# Patient Record
Sex: FEMALE | Race: WHITE | NOT HISPANIC OR LATINO | Employment: OTHER | URBAN - METROPOLITAN AREA
[De-identification: names, ages, dates, MRNs, and addresses within clinical notes are randomized per-mention and may not be internally consistent; named-entity substitution may affect disease eponyms.]

---

## 2021-02-19 ENCOUNTER — NURSING HOME VISIT (OUTPATIENT)
Dept: GERIATRICS | Facility: OTHER | Age: 71
End: 2021-02-19
Payer: COMMERCIAL

## 2021-02-19 VITALS
HEART RATE: 84 BPM | RESPIRATION RATE: 16 BRPM | OXYGEN SATURATION: 96 % | DIASTOLIC BLOOD PRESSURE: 64 MMHG | WEIGHT: 145.6 LBS | SYSTOLIC BLOOD PRESSURE: 123 MMHG | TEMPERATURE: 97.6 F

## 2021-02-19 DIAGNOSIS — J90 PLEURAL EFFUSION: ICD-10-CM

## 2021-02-19 DIAGNOSIS — E11.9 DIABETES MELLITUS TYPE 2 IN NONOBESE (HCC): ICD-10-CM

## 2021-02-19 DIAGNOSIS — K70.30 ALCOHOLIC CIRRHOSIS OF LIVER WITHOUT ASCITES (HCC): ICD-10-CM

## 2021-02-19 DIAGNOSIS — I85.11 SECONDARY ESOPHAGEAL VARICES WITH BLEEDING (HCC): ICD-10-CM

## 2021-02-19 DIAGNOSIS — G62.9 NEUROPATHY: ICD-10-CM

## 2021-02-19 DIAGNOSIS — D62 ACUTE BLOOD LOSS ANEMIA: ICD-10-CM

## 2021-02-19 DIAGNOSIS — S32.040D COMPRESSION FRACTURE OF L4 VERTEBRA WITH ROUTINE HEALING, SUBSEQUENT ENCOUNTER: Primary | ICD-10-CM

## 2021-02-19 DIAGNOSIS — F10.11 HISTORY OF ALCOHOL ABUSE: ICD-10-CM

## 2021-02-19 PROCEDURE — 99305 1ST NF CARE MODERATE MDM 35: CPT | Performed by: INTERNAL MEDICINE

## 2021-02-20 PROBLEM — G62.9 NEUROPATHY: Status: ACTIVE | Noted: 2021-02-20

## 2021-02-20 PROBLEM — F10.11 HISTORY OF ALCOHOL ABUSE: Status: ACTIVE | Noted: 2021-02-20

## 2021-02-20 PROBLEM — E11.9 DIABETES MELLITUS TYPE 2 IN NONOBESE (HCC): Status: ACTIVE | Noted: 2021-02-20

## 2021-02-20 PROBLEM — S32.000A COMPRESSION FRACTURE OF LUMBAR VERTEBRA (HCC): Status: ACTIVE | Noted: 2021-02-20

## 2021-02-20 PROBLEM — I85.11 SECONDARY ESOPHAGEAL VARICES WITH BLEEDING (HCC): Status: ACTIVE | Noted: 2021-02-20

## 2021-02-20 PROBLEM — K70.30 ALCOHOLIC CIRRHOSIS OF LIVER WITHOUT ASCITES (HCC): Status: ACTIVE | Noted: 2021-02-20

## 2021-02-20 PROBLEM — D62 ACUTE BLOOD LOSS ANEMIA: Status: ACTIVE | Noted: 2021-02-20

## 2021-02-20 PROBLEM — J90 PLEURAL EFFUSION: Status: ACTIVE | Noted: 2021-02-20

## 2021-02-20 NOTE — ASSESSMENT & PLAN NOTE
Noted recently on EGD  Continue atenolol and spironolactone  Patient additionally remains on Protonix  Monitor hemoglobin    Recent hemoglobin was 8 5

## 2021-02-20 NOTE — ASSESSMENT & PLAN NOTE
Patient was recently hospitalized with back pain post fall  Imaging studies revealed L1 and L4 compression fracture  Nonsurgical conservative treatment was recommended with analgesics and TLSO brace  Patient remains on oxycodone p r n  for now  Continue PT OT

## 2021-02-20 NOTE — PROGRESS NOTES
[facility]  [address], [address] [place] [address]    Nursing Home Admission    NAME: Kelsey Chaney  AGE: 78 y o  SEX: female [id number]      Patient Location     [facility]    Patients care was coordinated with nursing facility staff  Recent vitals, labs and updated medications were reviewed on [facility] system of facility  Past Medical, surgical, social, medication and allergy history and patients previous records reviewed  Assessment/Plan:    Compression fracture of lumbar vertebra [facility])   Patient was recently hospitalized with back pain post fall  Imaging studies revealed L1 and L4 compression fracture  Nonsurgical conservative treatment was recommended with analgesics and TLSO brace  Patient remains on oxycodone p r n  for now  Continue PT OT  Alcoholic cirrhosis of liver without ascites ([facility] [address] )   Patient is known to have alcoholic liver cirrhosis  Remains on lactulose, atenolol and spironolactone  Patient reports she does not tolerate lactulose well due to diarrhea  Check ammonia level  Recent EGD revealed esophageal varices  Patient was additionally noted to have acute blood loss anemia needing ICU stay  Continue Protonix follow-up with GI service    Diabetes mellitus type 2 in nonobese (HCC)    Last hemoglobin A1c is unknown  Recent blood sugars were  variable with readings of 212, 174 158 in 297  Patient remains on metformin and glimepiride  Reports having a hypoglycemic episode approximately a month ago in the setting of decreased oral intake  Considering increased risk of hypoglycemia with sulfonylureas, consider alternative treatment with insulin  Will defer to PCP  Follow blood sugars for now    Neuropathy   Continue gabapentin    Secondary esophageal varices with bleeding ([facility] [address] )   Noted recently on EGD  Continue atenolol and spironolactone  Patient additionally remains on Protonix  Monitor hemoglobin  Recent hemoglobin was 8 5    History of alcohol abuse   Patient was counseled regarding alcohol cessation    Pleural effusion   Recent chest x-ray revealed pleural effusion  Patient underwent thoracentesis x3 recently at the hospital   Currently denies any shortness of breath monitor closely for any recurrence of effusion    Acute blood loss anemia  Recent HGB was 8 5  Continue Protonix  Chief Complaint      nursing home admission   L1, L4 compression fracture, pleural effusion status post thoracentesis      HPI     Patient is a 78 y o  female  With past medical history significant for diabetes mellitus type 2, hypertension,  Alcoholic liver cirrhosis, hyperlipidemia, esophageal varices and neuropathy  Patient was recently hospitalized  with back pain post fall at home  she was admitted under Trauma Service  Imaging studies revealed L1 and L4 compression fracture  Conservative treatment with  Analgesics and TLSO brace  was recommended  During the hospital stay patient was also noted to have large size pleural effusion for which she underwent IR guided right thoracentesis on 02/05 and 2/17  Patient was additionally found to have GI bleed and was seen by GI service  EGD revealed  Bleeding esophageal varices and polyps  During this time patient also developed acute blood loss anemia and required a stay in ICU for stabilization  Patient required supplemental oxygen near time of discharge,  IR performed another thoracentesis prior to discharge  She was subsequently transferred out of ICU stabilized, was seen by PT OT services and later discharged to [facility] rehab where she is being seen for post hospital admission  At the time of my evaluation patient is doing okay  Back pain is controlled  Denies any dyspnea or chest pain    There have been no reports of any fever or chills    PMH   alcoholic liver cirrhosis  Diabetes mellitus type 2   Neuropathy Hypertension  Hyperlipidemia  Neuropathy   Seasonal allergies    PSH:   breast biopsy  Cholecystectomy  Nasal septum surgery     Medication:  Updated list was reviewed in [facility] of facility  Lasix 40 mg daily  Atenolol 25 mg daily   Lactulose 30 mL t i d   Glimepiride 4 mg b i d  Oxycodone 5 mg q 6 hours p r n  Loratadine 10 mg daily   lidocaine patches  to be applied to affected area daily   spironolactone 50 mg daily   gabapentin 300 mg 3 times a day   pantoprazole 40 mg daily   Metformin 500 mg t i d  acetaminophen on p r n  basis  Sodium chloride hypertonic high drops 1 drop in both eyes daily    Family history   noncontributory    Allergies:   no known drug allergies      Social history:    No history of tobacco abuse  Pt reports drinking heavily in the past   Reports drinking until a month ago  She lives at home with  her       Vitals:    02/19/21 0915   BP: 123/64   Pulse: 84   Resp: 16   Temp: 97 6 °F (36 4 °C)   SpO2: 96%       Review of Systems   Constitutional: Negative for chills, fatigue and fever  HENT: Negative for nosebleeds and rhinorrhea  Eyes: Negative for discharge and redness  Respiratory: Negative for cough, chest tightness, shortness of breath, wheezing and stridor  Cardiovascular: Negative for chest pain and leg swelling  Gastrointestinal: Negative for abdominal distention, abdominal pain, diarrhea and vomiting  Genitourinary: Negative for dysuria, flank pain and hematuria  Musculoskeletal: Positive for back pain and gait problem  Negative for arthralgias  Skin: Negative for pallor  Neurological: Positive for weakness (Generalized)  Negative for tremors, seizures, syncope and headaches  Psychiatric/Behavioral: Negative for agitation, behavioral problems and confusion  Physical Exam  Constitutional:       General: She is not in acute distress  Appearance: She is well-developed  She is not diaphoretic     HENT:      Head: Normocephalic and atraumatic  Nose: No rhinorrhea  Eyes:      General: No scleral icterus  Right eye: No discharge  Left eye: No discharge  Neck:      Musculoskeletal: Neck supple  Cardiovascular:      Rate and Rhythm: Normal rate and regular rhythm  Pulmonary:      Effort: No respiratory distress  Breath sounds: No stridor  No wheezing  Abdominal:      General: There is no distension  Palpations: Abdomen is soft  Tenderness: There is no abdominal tenderness  Comments: Enlarged liver   Musculoskeletal:      Right lower leg: No edema  Left lower leg: No edema  Skin:     Coloration: Skin is not jaundiced or pale  Neurological:      General: No focal deficit present  Mental Status: She is alert and oriented to person, place, and time  Cranial Nerves: No cranial nerve deficit  Psychiatric:         Mood and Affect: Mood normal          Behavior: Behavior normal            Diagnostic Data       Recent labs were reviewed  labs done on 02/04/2021 revealed hemoglobin of 8 5, WBC count 4 9, platelet count 993  BUN 33, creatinine 0 77, blood glucose 247, sodium 138, potassium 4 6    Additional notes:   Continue current meds  Monitor closely for any recurrence of pleural effusion    Continue PT OT  Repeat labs in 1 week   consider switching to insulin per PCP as patient has increased risk of hypoglycemia with glimepiride      This note was electronically signed by Dr Cindy Bowers

## 2021-02-20 NOTE — ASSESSMENT & PLAN NOTE
Last hemoglobin A1c is unknown  Recent blood sugars were  variable with readings of 212, 174 158 in 297  Patient remains on metformin and glimepiride  Reports having a hypoglycemic episode approximately a month ago in the setting of decreased oral intake  Considering increased risk of hypoglycemia with sulfonylureas, consider alternative treatment with insulin  Will defer to PCP    Follow blood sugars for now

## 2021-02-20 NOTE — ASSESSMENT & PLAN NOTE
Patient is known to have alcoholic liver cirrhosis  Remains on lactulose, atenolol and spironolactone  Patient reports she does not tolerate lactulose well due to diarrhea  Check ammonia level  Recent EGD revealed esophageal varices  Patient was additionally noted to have acute blood loss anemia needing ICU stay    Continue Protonix follow-up with GI service

## 2021-02-20 NOTE — ASSESSMENT & PLAN NOTE
Recent chest x-ray revealed pleural effusion    Patient underwent thoracentesis x3 recently at the hospital   Currently denies any shortness of breath monitor closely for any recurrence of effusion

## 2021-02-23 ENCOUNTER — NURSING HOME VISIT (OUTPATIENT)
Dept: GERIATRICS | Facility: OTHER | Age: 71
End: 2021-02-23
Payer: COMMERCIAL

## 2021-02-23 VITALS
OXYGEN SATURATION: 97 % | DIASTOLIC BLOOD PRESSURE: 74 MMHG | TEMPERATURE: 97.5 F | SYSTOLIC BLOOD PRESSURE: 125 MMHG | RESPIRATION RATE: 18 BRPM | HEART RATE: 81 BPM

## 2021-02-23 DIAGNOSIS — I85.11 SECONDARY ESOPHAGEAL VARICES WITH BLEEDING (HCC): ICD-10-CM

## 2021-02-23 DIAGNOSIS — D62 ACUTE BLOOD LOSS ANEMIA: ICD-10-CM

## 2021-02-23 DIAGNOSIS — E11.9 DIABETES MELLITUS TYPE 2 IN NONOBESE (HCC): ICD-10-CM

## 2021-02-23 DIAGNOSIS — S32.040D COMPRESSION FRACTURE OF L4 VERTEBRA WITH ROUTINE HEALING, SUBSEQUENT ENCOUNTER: Primary | ICD-10-CM

## 2021-02-23 DIAGNOSIS — F10.11 HISTORY OF ALCOHOL ABUSE: ICD-10-CM

## 2021-02-23 DIAGNOSIS — K70.30 ALCOHOLIC CIRRHOSIS OF LIVER WITHOUT ASCITES (HCC): ICD-10-CM

## 2021-02-23 DIAGNOSIS — G62.9 NEUROPATHY: ICD-10-CM

## 2021-02-23 DIAGNOSIS — J90 PLEURAL EFFUSION: ICD-10-CM

## 2021-02-23 DIAGNOSIS — R60.9 EDEMA, UNSPECIFIED TYPE: ICD-10-CM

## 2021-02-23 PROCEDURE — 99316 NF DSCHRG MGMT 30 MIN+: CPT | Performed by: NURSE PRACTITIONER

## 2021-02-23 RX ORDER — FUROSEMIDE 40 MG/1
40 TABLET ORAL DAILY
COMMUNITY

## 2021-02-24 RX ORDER — PANTOPRAZOLE SODIUM 40 MG/1
40 TABLET, DELAYED RELEASE ORAL DAILY
COMMUNITY

## 2021-02-24 RX ORDER — SILICONE ADHESIVE 1.5" X 3"
1 SHEET (EA) TOPICAL EVERY EVENING
COMMUNITY

## 2021-02-24 RX ORDER — ATENOLOL 25 MG/1
25 TABLET ORAL DAILY
COMMUNITY

## 2021-02-24 RX ORDER — GLIMEPIRIDE 4 MG/1
4 TABLET ORAL 2 TIMES DAILY
COMMUNITY

## 2021-02-24 RX ORDER — ACETAMINOPHEN 325 MG/1
650 TABLET ORAL EVERY 6 HOURS PRN
COMMUNITY

## 2021-02-24 RX ORDER — LORATADINE 10 MG/1
10 TABLET ORAL DAILY
COMMUNITY

## 2021-02-24 RX ORDER — LACTULOSE 10 G/15ML
20 SOLUTION ORAL 3 TIMES DAILY
COMMUNITY

## 2021-02-24 RX ORDER — OXYCODONE HYDROCHLORIDE 5 MG/1
5 CAPSULE ORAL EVERY 6 HOURS PRN
COMMUNITY
End: 2021-02-25 | Stop reason: SDUPTHER

## 2021-02-24 RX ORDER — GABAPENTIN 300 MG/1
300 CAPSULE ORAL 3 TIMES DAILY
COMMUNITY

## 2021-02-24 NOTE — ASSESSMENT & PLAN NOTE
·  Continue wearing brace  ·  Follow-up with Dr Toshia Sierra ,Orthopedics on discharge and for continue pain management  ·  Oxycodone 5 mg 1 tablet every 6 hours p r n   ·  lidocaine Mental pouch applied to lumbar area L1-L4 topically   ·  gabapentin 300 mg tablets t i d

## 2021-02-24 NOTE — ASSESSMENT & PLAN NOTE
No results found for: HGBA1C   · Blood sugar today was  108    · Continue glimepiride 4 mg b i d  to and metformin 500 mg t i d   · Follow-up with PCP for monitoring

## 2021-02-24 NOTE — ASSESSMENT & PLAN NOTE
·  Continue atenolol 25 mg daily, spironolactone 50 mg daily  ·  Follow-up with GI service  for monitoring

## 2021-02-24 NOTE — ASSESSMENT & PLAN NOTE
·  Continue atenolol 25 mg daily, lactulose 30 mL L 3 times a day, spironolactone 50 mg daily  ·  Follow-up with   Dr Bethany Okeefe, GI service on discharge

## 2021-02-24 NOTE — PROGRESS NOTES
[facility]  [address]  [phone]   CODE 021    DISCHARGE NOTE    Name: Carson Chaney  AGE: 78 y o  SEX: female    MRN: [id number]    DATE OF ENCOUNTER:  02/23/2021    Patient Location     [facility]    Reason For Visit/ Chief Complaint     DISCHARGE       1  Compression fracture of L4 vertebra with routine healing, subsequent encounter  Assessment & Plan:  ·  Continue wearing brace  ·  Follow-up with Dr Jayson Oakley ,Orthopedics on discharge and for continue pain management  ·  Oxycodone 5 mg 1 tablet every 6 hours p r n   ·  lidocaine Mental pouch applied to lumbar area L1-L4 topically   ·  gabapentin 300 mg tablets t i d  2  Acute blood loss anemia  Assessment & Plan:  · Improved  · Previous Hgb was 8 3  · Hgb on 02/22/2021 was 10 3        3  Pleural effusion  Assessment & Plan:  ·   Patient denies shortness of breath  ·   Follow-up with PCP for monitoring  4  Secondary esophageal varices with bleeding (HCC)  Assessment & Plan:  ·  Continue atenolol 25 mg daily, spironolactone 50 mg daily  ·  Follow-up with GI service  for monitoring      5  Diabetes mellitus type 2 in nonobese [facility])  Assessment & Plan:    No results found for: HGBA1C   · Blood sugar today was  108  · Continue glimepiride 4 mg b i d  to and metformin 500 mg t i d   · Follow-up with PCP for monitoring        6  Edema, unspecified type  Assessment & Plan:  ·  Continue Lasix 40 mg daily  ·   Elevate lower extremities  7  Alcoholic cirrhosis of liver without ascites (HCC)  Assessment & Plan:  ·  Continue atenolol 25 mg daily, lactulose 30 mL L 3 times a day, spironolactone 50 mg daily  ·  Follow-up with   Dr Frances Baumann, GI service on discharge  8  History of alcohol abuse  Assessment & Plan:  ·  Patient counseling  ·  consider alcoholics anonymous      9   Neuropathy  Assessment & Plan:  · Continue gabapentin 300 mg t i d  ·   Fall precautions  HPI      Drury Cockayne is a 78 y o  female who is being seen today for discharge stability and prescription refills  She has a past medical history of alcoholic liver cirrhosis, HTN, DM 2, HLD, esophageal varices, and neuropathy  Patient was recently hospitalized status post fall at home with increased back pain  Imaging studies reveal L1 and L4 compression fractures  Conservative treatment was recommended with pain relief and brace   Additionally, she was found to have a GI bleed and was seen by GI service, she underwent IR guided thoracentesis for pleural effusions  She was sent to [facility] rehab facility for PT/OT services  Patient is being seen at request of nurse and   Patient is leaving angry messages with explitives  She  Continuously route to nurses and staff demanding that she wants to go home tomorrow and will sign herself out AMA  regardless  Patient was advised  she should remain in facility for more days until she gets stronger  Patient  adamant to going home  Spoke to therapy department and her mobility is OK  She lives home with her    will set her up with home health services, PT/ OT, in nursing services at home  Although I believe she would benefit from staying in facility for monitoring of her medical issues and for further PT/OT treatment, she will not stay  There is a possible chance of rehospitalization due to her previous medical issues, recurrent pleural effusions, and behavioral issues  Patient has been advised of this  She is of sound mind and alert, awake an oriented  On examination she reports continuous back pain  She has an angry demeanor with demeaning and degrading comments  Vital signs are stable  Other than behavioral issues no other current issues reported by staff    She denies fever, chills, nausea, vomiting, abdominal pain, chest pain,  Increased shortness of breath, urinary issues, constipation issues  I spoke to Dr Divina Brown regarding patient's pain level and medication  She will be in to see her today  Instruction will be given to her to follow-up with Dr Derick Acosta, PCP, Dr Matilde Hernandez, Darius Bartlett, and Dr Belinda Dodd, Endocrinology  I will reach out to her PCP to inform him of patient  Review of Systems   Constitutional: Positive for fatigue  Negative for chills and fever  HENT: Negative for nosebleeds and rhinorrhea  Eyes: Negative for discharge and redness  Respiratory: Negative for cough, chest tightness, shortness of breath, wheezing and stridor  Cardiovascular: Negative for chest pain and leg swelling  Gastrointestinal: Negative for abdominal distention, abdominal pain, constipation, diarrhea, nausea and vomiting  Genitourinary: Negative for difficulty urinating, dysuria, flank pain and hematuria  Musculoskeletal: Positive for back pain (Lumbar) and gait problem  Negative for arthralgias  Skin: Negative for pallor  Neurological: Positive for weakness (Generalized)  Negative for tremors, seizures, syncope and headaches  Psychiatric/Behavioral: Positive for agitation and behavioral problems  Negative for confusion  Past Medical History:   Diagnosis Date    ALC (alcoholic liver cirrhosis) (HCC)     Diabetes mellitus ([facility] [address] )     HLD (hyperlipidemia)     Hypertension     Neuropathy     Seasonal allergies        Past Surgical History:   Procedure Laterality Date    BREAST BIOPSY      CHOLECYSTECTOMY      NASAL SEPTUM SURGERY         Social History     Social History     Tobacco Use   Smoking Status Never Smoker       Social History     Substance and Sexual Activity   Alcohol Use Not Currently    Comment: Pt reports drinking heavily in the past   Reports drinking until a month ago  History reviewed  No pertinent family history       No Known Allergies    Medications Current Outpatient Medications:     acetaminophen (TYLENOL) 325 mg tablet, Take 650 mg by mouth every 6 (six) hours as needed for mild pain, Disp: , Rfl:     atenolol (TENORMIN) 25 mg tablet, Take 25 mg by mouth daily, Disp: , Rfl:     furosemide (LASIX) 40 mg tablet, Take 40 mg by mouth daily, Disp: , Rfl:     gabapentin (NEURONTIN) 300 mg capsule, Take 300 mg by mouth 3 (three) times a day , Disp: , Rfl:     glimepiride (AMARYL) 4 mg tablet, Take 4 mg by mouth 2 (two) times a day, Disp: , Rfl:     lactulose (CHRONULAC) 10 g/15 mL solution, Take 20 g by mouth 3 (three) times a day, Disp: , Rfl:     Lidocaine-Menthol 3 6-1 25 % PTCH, Apply 1 application topically daily, Disp: , Rfl:     loratadine (CLARITIN) 10 mg tablet, Take 10 mg by mouth daily, Disp: , Rfl:     metFORMIN (GLUCOPHAGE) 500 mg tablet, Take 500 mg by mouth 3 (three) times a day, Disp: , Rfl:     oxyCODONE (OXY-IR) 5 MG capsule, Take 5 mg by mouth every 6 (six) hours as needed for moderate pain or severe pain, Disp: , Rfl:     pantoprazole (PROTONIX) 40 mg tablet, Take 40 mg by mouth daily, Disp: , Rfl:     sodium chloride (EAMON 128) 5 % hypertonic ophthalmic solution, Administer 1 drop to both eyes every evening, Disp: , Rfl:     Updated list was reviewed in [address] [place] [address] system of facility  Vitals:    02/23/21 2345   BP: 125/74   Pulse: 81   Resp: 18   Temp: 97 5 °F (36 4 °C)   SpO2: 97%       Physical Exam  Constitutional:       General: She is not in acute distress  Appearance: She is well-developed  She is not diaphoretic  HENT:      Head: Normocephalic and atraumatic  Nose: No rhinorrhea  Eyes:      General: No scleral icterus  Right eye: No discharge  Left eye: No discharge  Conjunctiva/sclera: Conjunctivae normal    Cardiovascular:      Rate and Rhythm: Normal rate  Heart sounds: No murmur  Pulmonary:      Effort: Pulmonary effort is normal  No respiratory distress        Breath sounds: Normal breath sounds  No stridor  No wheezing or rales  Abdominal:      General: Bowel sounds are normal  There is no distension  Palpations: Abdomen is soft  Tenderness: There is no abdominal tenderness  There is no guarding  Musculoskeletal:         General: No deformity  Skin:     General: Skin is warm and dry  Neurological:      General: No focal deficit present  Mental Status: She is alert  Motor: Weakness present  Psychiatric:         Mood and Affect: Affect is angry  Behavior: Behavior is aggressive  Judgment: Judgment is impulsive  Comments: Demeaning and desrespectful         Diagnostic Data     Recent labs were reviewed     02/22/2021  o  Hgb 10 3, Hct 31 1, WBC 4 3  o  BUN 15, Cr 0 point in 2, , K 4 0  o  ammonia 53    Additional Notes      Patients care was coordinated with nursing facility staff  Recent vitals, labs and updated medications were reviewed on [facility]  Past Medical, surgical, social, medication and allergy history and patients previous records reviewed   Greater than 35 minutes was spent on discharge process which includes patient assessment, answering patient questions, review of labs, review of records, medication reconciliation, medication prescribing, care coordination, and providing post discharge instructions         This was electronically signed by Laray Epley, CRNP

## 2021-02-25 DIAGNOSIS — S32.040D COMPRESSION FRACTURE OF L4 VERTEBRA WITH ROUTINE HEALING, SUBSEQUENT ENCOUNTER: Primary | ICD-10-CM

## 2021-02-28 RX ORDER — OXYCODONE HYDROCHLORIDE 5 MG/1
5 CAPSULE ORAL EVERY 6 HOURS PRN
Qty: 120 CAPSULE | Refills: 0 | Status: SHIPPED | OUTPATIENT
Start: 2021-02-28

## 2021-11-03 ENCOUNTER — CONSULT (OUTPATIENT)
Dept: PAIN MEDICINE | Facility: CLINIC | Age: 71
End: 2021-11-03
Payer: COMMERCIAL

## 2021-11-03 VITALS
HEIGHT: 59 IN | SYSTOLIC BLOOD PRESSURE: 168 MMHG | DIASTOLIC BLOOD PRESSURE: 81 MMHG | RESPIRATION RATE: 16 BRPM | WEIGHT: 141 LBS | BODY MASS INDEX: 28.43 KG/M2 | HEART RATE: 89 BPM

## 2021-11-03 DIAGNOSIS — G62.9 NEUROPATHY: Primary | ICD-10-CM

## 2021-11-03 DIAGNOSIS — G89.4 CHRONIC PAIN SYNDROME: ICD-10-CM

## 2021-11-03 DIAGNOSIS — E11.42 DIABETIC PERIPHERAL NEUROPATHY (HCC): ICD-10-CM

## 2021-11-03 PROCEDURE — 99204 OFFICE O/P NEW MOD 45 MIN: CPT | Performed by: STUDENT IN AN ORGANIZED HEALTH CARE EDUCATION/TRAINING PROGRAM

## 2021-11-03 RX ORDER — TRAMADOL HYDROCHLORIDE 50 MG/1
50 TABLET ORAL EVERY 8 HOURS PRN
COMMUNITY
Start: 2021-07-07

## 2022-04-21 ENCOUNTER — OFFICE VISIT (OUTPATIENT)
Dept: GASTROENTEROLOGY | Facility: CLINIC | Age: 72
End: 2022-04-21
Payer: COMMERCIAL

## 2022-04-21 VITALS
DIASTOLIC BLOOD PRESSURE: 80 MMHG | HEIGHT: 59 IN | BODY MASS INDEX: 28.22 KG/M2 | WEIGHT: 140 LBS | SYSTOLIC BLOOD PRESSURE: 134 MMHG

## 2022-04-21 DIAGNOSIS — K70.30 ALCOHOLIC CIRRHOSIS OF LIVER WITHOUT ASCITES (HCC): Primary | ICD-10-CM

## 2022-04-21 PROCEDURE — 99204 OFFICE O/P NEW MOD 45 MIN: CPT | Performed by: INTERNAL MEDICINE

## 2022-04-21 RX ORDER — CARVEDILOL 3.12 MG/1
3.12 TABLET ORAL 2 TIMES DAILY WITH MEALS
COMMUNITY
Start: 2022-04-16

## 2022-04-21 RX ORDER — DULAGLUTIDE 1.5 MG/.5ML
INJECTION, SOLUTION SUBCUTANEOUS
COMMUNITY
Start: 2022-04-05

## 2022-04-21 RX ORDER — DIAZEPAM 10 MG/1
TABLET ORAL
COMMUNITY
Start: 2022-04-10

## 2022-04-21 RX ORDER — SPIRONOLACTONE 100 MG/1
100 TABLET, FILM COATED ORAL DAILY
COMMUNITY
Start: 2022-02-27

## 2022-04-21 RX ORDER — FERROUS SULFATE 325(65) MG
1 TABLET ORAL EVERY OTHER DAY
COMMUNITY
Start: 2022-02-08

## 2022-04-21 RX ORDER — POTASSIUM CHLORIDE 750 MG/1
10 TABLET, FILM COATED, EXTENDED RELEASE ORAL 2 TIMES DAILY
COMMUNITY
Start: 2022-01-04

## 2022-04-21 NOTE — PROGRESS NOTES
[facility]      Chief Complaint:  Cirrhosis    HPI:  Heather Thakkar is a 73 y o   female who presents with alcoholic cirrhosis  Patient was admitted to [facility]  Last EGD on September 2021 showed grade 1-2 varices as well as gastric and duodenal polyps  The patient underwent ultrasound in July 2021 which showed cirrhosis but no other lesions  In February she underwent CBC which showed a hematocrit of 34 9 and platelet count of a 997905  Liver functions were normal except albumin of 3 3  Patient however continues to drink alcohol  She minimizes the importance of stopping  We had a long conversation about this  She was told recently she has more fluid in the abdomen  She has had some mild weight gain  No peripheral edema  She is scheduled for repeat ultrasound  She has no confusion  No alteration of the sleep-wake cycle  No hematemesis or melena  No jaundice  No change in the color of the urine  She has chronic imbalance from neuropathy  She has had broken back recently with back pain         Review of Systems:   Constitutional: No fever or chills, feels well, no tiredness, no recent weight gain or weight loss  HENT: No complaints of earache, no hearing loss, no nosebleeds, no nasal discharge, no sore throat, no hoarseness  Eyes: No complaints of eye pain, no red eyes, no discharge from eyes, no itchy eyes  Cardiovascular: No complaints of slow heart rate, no fast heart rate, no chest pain, no palpitations, no leg claudication, no lower extremity edema  Respiratory: No complaints of shortness of breath, no wheezing, no cough, no SOB on exertion, no orthopnea  Gastrointestinal: As noted in HPI  Genitourinary: No complaints of dysuria, no incontinence, no hesitancy, no nocturia     Musculoskeletal:  As per HPI  Neurological: No complaints of headache, no confusion, no convulsions, positive lower extremity numbness and tingling, no dizziness or fainting, no limb weakness, positive difficulty walking  Skin: No complaints of skin rash or skin lesions, no itching, no skin wound, no dry skin  Hematological/Lymphatic: No complaints of swollen glands, does not bleed easy  Allergic/Immunologic: No immunocompromised state  Endocrine:  No complaints of polyuria, no polydipsia  Psychiatric/Behavioral: is not suicidal, no sleep disturbances, no anxiety or depression, no change in personality, no emotional problems  Historical Information   Past Medical History:   Diagnosis Date    ALC (alcoholic liver cirrhosis) (HCC)     Diabetes mellitus ([facility] [address] )     HLD (hyperlipidemia)     Hypertension     Neuropathy     Seasonal allergies      Past Surgical History:   Procedure Laterality Date    BREAST BIOPSY      CHOLECYSTECTOMY      NASAL SEPTUM SURGERY       Social History   Social History     Substance and Sexual Activity   Alcohol Use Yes    Comment: RARE      Social History     Substance and Sexual Activity   Drug Use Yes    Types: Marijuana    Comment:  unknown     Social History     Tobacco Use   Smoking Status Never Smoker   Smokeless Tobacco Never Used     Family History   Problem Relation Age of Onset    Emphysema Mother     Heart attack Father     Heart disease Father          Current Medications: has a current medication list which includes the following prescription(s): carvedilol, ferrous sulfate, furosemide, gabapentin, glimepiride, insulin degludec, loratadine, metformin, pantoprazole, potassium chloride, sodium chloride, spironolactone, trulicity, acetaminophen, atenolol, diazepam, lactulose, lidocaine-menthol, oxycodone, and tramadol          Vital Signs: /80   Ht 4' 10 5" (1 486 m)   Wt 63 5 kg (140 lb)   BMI 28 76 kg/m²       Physical Exam:   Constitutional  General Appearance: No acute distress, well appearing and well nourished  Head  Normocephalic  Eyes  Conjunctivae and lids: No swelling, erythema, or discharge  Pupils and irises: Equal, round and reactive to light  Ears, Nose, Mouth, and Throat  External inspection of ears and nose: Normal  Nasal mucosa, septum and turbinates: Normal without edema or erythema/   Oropharynx: Normal with no erythema, edema, exudate or lesions  Neck  Normal range of motion  Neck supple  Cardiovascular  Auscultation of the heart: Normal rate and rhythm, normal S1 and S2 without murmurs  Examination of the extremities for edema and/or varicosities: Normal  Pulmonary/Chest  Respiratory effort: No increased work of breathing or signs of respiratory distress  Auscultation of lungs: Clear to auscultation, equal breath sounds bilaterally, no wheezes, rales, no rhonchi  Abdomen  Abdomen: Non-tender, no masses  Liver and spleen:  Firm liver edge palpable 1 cm below right costal margin  Mild shifting dullness  Mild fluid wave  Musculoskeletal  Gait and station: normal   Digits and Nails: normal without clubbing or cyanosis  Inspection/palpation of joints, bones, and muscles: Normal  Neurological  No nystagmus or asterixis  Skin  Skin and subcutaneous tissue: Normal without rashes or lesions  Positive palmar erythema  Lymphatic  Palpation of the lymph nodes in neck: No lymphadenopathy  Psychiatric  Orientation to person, place and time: Normal   Mood and affect: Normal          Labs:  Lab Results   Component Value Date    HGBA1C 9 4 (H) 02/16/2022         X-Rays & Procedures:   No orders to display           ______________________________________________________________________      Assessment & Plan:     Diagnoses and all orders for this visit:    Alcoholic cirrhosis of liver without ascites ([facility] [address] )      I stressed to both the patient and her  the importance of stopping all alcohol use which she still has not done  She will take it under advisement  She has an ultrasound scheduled at [facility] coming up  She will call me for the results    We will see her again in September and repeat her EGD after that visit  Further recommendations will depend on her progress and if she quits drinking  We did go over some viable rates in people with cirrhosis    Further recommendations will depend on progress

## 2022-07-13 LAB
LEFT EYE DIABETIC RETINOPATHY: POSITIVE
RIGHT EYE DIABETIC RETINOPATHY: POSITIVE

## 2022-08-22 ENCOUNTER — TELEPHONE (OUTPATIENT)
Dept: GASTROENTEROLOGY | Facility: CLINIC | Age: 72
End: 2022-08-22

## 2022-08-22 NOTE — TELEPHONE ENCOUNTER
Patient had office Visit scheduled for 09/15/22 with Doctor  Patient cancelled  Patient will call the office when ready to reschedule

## 2022-10-28 ENCOUNTER — TELEPHONE (OUTPATIENT)
Dept: GASTROENTEROLOGY | Facility: CLINIC | Age: 72
End: 2022-10-28

## 2022-11-16 ENCOUNTER — TELEPHONE (OUTPATIENT)
Dept: FAMILY MEDICINE CLINIC | Facility: CLINIC | Age: 72
End: 2022-11-16

## 2022-11-16 ENCOUNTER — OFFICE VISIT (OUTPATIENT)
Dept: FAMILY MEDICINE CLINIC | Facility: CLINIC | Age: 72
End: 2022-11-16

## 2022-11-16 VITALS
TEMPERATURE: 97.2 F | OXYGEN SATURATION: 98 % | HEART RATE: 81 BPM | WEIGHT: 137 LBS | SYSTOLIC BLOOD PRESSURE: 124 MMHG | BODY MASS INDEX: 27.62 KG/M2 | HEIGHT: 59 IN | DIASTOLIC BLOOD PRESSURE: 78 MMHG

## 2022-11-16 DIAGNOSIS — N18.30 STAGE 3 CHRONIC KIDNEY DISEASE, UNSPECIFIED WHETHER STAGE 3A OR 3B CKD (HCC): ICD-10-CM

## 2022-11-16 DIAGNOSIS — F11.20 CONTINUOUS OPIOID DEPENDENCE (HCC): ICD-10-CM

## 2022-11-16 DIAGNOSIS — S32.040D COMPRESSION FRACTURE OF L4 VERTEBRA WITH ROUTINE HEALING, SUBSEQUENT ENCOUNTER: ICD-10-CM

## 2022-11-16 DIAGNOSIS — Z00.00 ENCOUNTER FOR ANNUAL WELLNESS VISIT (AWV) IN MEDICARE PATIENT: ICD-10-CM

## 2022-11-16 DIAGNOSIS — E11.42 DIABETIC PERIPHERAL NEUROPATHY (HCC): ICD-10-CM

## 2022-11-16 DIAGNOSIS — G62.9 NEUROPATHY: ICD-10-CM

## 2022-11-16 DIAGNOSIS — K70.30 ALCOHOLIC CIRRHOSIS OF LIVER WITHOUT ASCITES (HCC): ICD-10-CM

## 2022-11-16 DIAGNOSIS — I82.491 ACUTE EMBOLISM AND THROMBOSIS OF OTHER SPECIFIED DEEP VEIN OF RIGHT LOWER EXTREMITY (HCC): ICD-10-CM

## 2022-11-16 DIAGNOSIS — E11.9 DIABETES MELLITUS TYPE 2 IN NONOBESE (HCC): Primary | ICD-10-CM

## 2022-11-16 DIAGNOSIS — I85.11 SECONDARY ESOPHAGEAL VARICES WITH BLEEDING (HCC): ICD-10-CM

## 2022-11-16 DIAGNOSIS — Z78.0 ASYMPTOMATIC POSTMENOPAUSAL STATE: ICD-10-CM

## 2022-11-16 DIAGNOSIS — F10.11 HISTORY OF ALCOHOL ABUSE: ICD-10-CM

## 2022-11-16 PROBLEM — R60.9 EDEMA: Status: RESOLVED | Noted: 2021-02-23 | Resolved: 2022-11-16

## 2022-11-16 PROBLEM — J90 PLEURAL EFFUSION: Status: RESOLVED | Noted: 2021-02-20 | Resolved: 2022-11-16

## 2022-11-16 PROBLEM — D62 ACUTE BLOOD LOSS ANEMIA: Status: RESOLVED | Noted: 2021-02-20 | Resolved: 2022-11-16

## 2022-11-16 LAB — SL AMB POCT HEMOGLOBIN AIC: 6.3 (ref ?–6.5)

## 2022-11-16 RX ORDER — BLOOD-GLUCOSE METER
EACH MISCELLANEOUS
COMMUNITY
Start: 2022-06-14

## 2022-11-16 NOTE — PATIENT INSTRUCTIONS

## 2022-11-16 NOTE — ASSESSMENT & PLAN NOTE
Occurred last year  Gabapentin and tramadol as needed    Avoid NSAIDs and Tylenol given other pathologies

## 2022-11-16 NOTE — ASSESSMENT & PLAN NOTE
No results found for: EGFR, CREATININE   Follows with Nephrology has appointment next week need to monitor renal function closely on Lasix, metformin and Aldactone

## 2022-11-16 NOTE — PROGRESS NOTES
Assessment and Plan:     Problem List Items Addressed This Visit        Digestive    Alcoholic cirrhosis of liver without ascites ([address] )     Is establish with a new GI doctor  It has been stable on Lasix and Coreg  I do recommend a low-dose Aldactone but will need to coordinate with her nephrologist as her renal function has been quite variable  She also states she does not take lactulose and has daily bowel movements and has not needed lactulose in some time  Discussed signs or symptoms to monitor for and to take if she ever becomes constipated and educate the  symptoms to watch for  Secondary esophageal varices with bleeding (HCC)     Recently had an EGD and establish with a new GI doctor for follow-up surveillance            Endocrine    Diabetes mellitus type 2 in nonobese [facility]) - Primary       Lab Results   Component Value Date    HGBA1C 6 3 11/16/2022   Blood sugars overall well today on metformin glimepiride  Is not on a statin or an Ace  Will follow-up with urine microalbumin and foot exam complete  Follows with an outside provider for eye exams         Relevant Orders    POCT hemoglobin A1c (Completed)    Microalbumin / creatinine urine ratio    Diabetic peripheral neuropathy (HCC)       Lab Results   Component Value Date    HGBA1C 6 3 11/16/2022     He gabapentin 300 mg 3 times daily and monitor renal function            Cardiovascular and Mediastinum    RESOLVED: Acute embolism and thrombosis of other specified deep vein of right lower extremity (HCC)       Nervous and Auditory    RESOLVED: Neuropathy       Musculoskeletal and Integument    Compression fracture of lumbar vertebra ([facility] [address] )     Occurred last year  Gabapentin and tramadol as needed    Avoid NSAIDs and Tylenol given other pathologies            Genitourinary    Stage 3 chronic kidney disease, unspecified whether stage 3a or 3b CKD ([facility] [address] )     No results found for: EGFR, CREATININE   Follows with Nephrology has appointment next week need to monitor renal function closely on Lasix, metformin and Aldactone            Other    History of alcohol abuse     Continue cessation         Continuous opioid dependence ([facility] [address] )   Other Visit Diagnoses     Asymptomatic postmenopausal state        Relevant Orders    DXA bone density spine hip and pelvis    Encounter for annual wellness visit (AWV) in Medicare patient            BMI Counseling: Body mass index is 28 15 kg/m²  Follow-up plan was not completed due to patient being in urgent or emergent medical situation  Body mass index is 28 15 kg/m²  Follow-up plan was not completed due to elderly patient (74 years old) where weight reduction/weight gain would complicate underlying health condition such as: illness or physical disability  Depression Screening and Follow-up Plan: Patient was screened for depression during today's encounter  They screened negative with a PHQ-2 score of 0  Preventive health issues were discussed with patient, and age appropriate screening tests were ordered as noted in patient's After Visit Summary  Personalized health advice and appropriate referrals for health education or preventive services given if needed, as noted in patient's After Visit Summary  History of Present Illness:     Patient presents for a Medicare Wellness Visit    Patient coming to establish care and go over chronic conditions peers no acute concerns     Patient Care Team:  Partick Abraham MD as PCP - General (Family Medicine)     Review of Systems:     Review of Systems   Constitutional: Negative for chills, fatigue and fever  HENT: Negative for rhinorrhea and sore throat  Eyes: Negative for visual disturbance  Respiratory: Negative for cough and shortness of breath  Cardiovascular: Negative for chest pain and palpitations  Gastrointestinal: Negative for abdominal pain, constipation, diarrhea, nausea and vomiting     Genitourinary: Negative for difficulty urinating, dysuria and frequency  Musculoskeletal: Negative for arthralgias and myalgias  Skin: Negative for color change and rash  Neurological: Negative for weakness and headaches          Problem List:     Patient Active Problem List   Diagnosis   • Compression fracture of lumbar vertebra (HCC)   • Alcoholic cirrhosis of liver without ascites (HCC)   • Diabetes mellitus type 2 in nonobese [facility])   • Secondary esophageal varices with bleeding (HCC)   • History of alcohol abuse   • Continuous opioid dependence ([facility] )   • Diabetic peripheral neuropathy ([place] [address] )   • Stage 3 chronic kidney disease, unspecified whether stage 3a or 3b CKD ([place] [address] )      Past Medical and Surgical History:     Past Medical History:   Diagnosis Date   • Acute blood loss anemia 2/20/2021   • Acute embolism and thrombosis of other specified deep vein of right lower extremity ([place] [address] ) 26/17/3219   • ALC (alcoholic liver cirrhosis) ([place] [address] )    • Diabetes mellitus ([place] [address] )    • Edema 2/23/2021   • HLD (hyperlipidemia)    • Hypertension    • Neuropathy    • Neuropathy 2/20/2021   • Pleural effusion 2/20/2021   • Seasonal allergies      Past Surgical History:   Procedure Laterality Date   • BREAST BIOPSY     • CHOLECYSTECTOMY     • NASAL SEPTUM SURGERY        Family History:     Family History   Problem Relation Age of Onset   • Emphysema Mother    • Heart attack Father    • Heart disease Father       Social History:     Social History     Socioeconomic History   • Marital status: /Civil Union     Spouse name: None   • Number of children: None   • Years of education: None   • Highest education level: None   Occupational History   • None   Tobacco Use   • Smoking status: Never   • Smokeless tobacco: Never   Vaping Use   • Vaping Use: Never used   Substance and Sexual Activity   • Alcohol use: Yes     Comment: RARE    • Drug use: Yes     Types: Marijuana     Comment:  unknown   • Sexual activity: None   Other Topics Concern   • None   Social History Narrative   • None Social Determinants of Health     Financial Resource Strain: Low Risk    • Difficulty of Paying Living Expenses: Not hard at all   Food Insecurity: Not on file   Transportation Needs: No Transportation Needs   • Lack of Transportation (Medical): No   • Lack of Transportation (Non-Medical): No   Physical Activity: Not on file   Stress: Not on file   Social Connections: Not on file   Intimate Partner Violence: Not on file   Housing Stability: Not on file      Medications and Allergies:     Current Outpatient Medications   Medication Sig Dispense Refill   • Blood Glucose Monitoring Suppl ([employer] Blood Glucose Meter) w/Device KIT OneTouch Ultra  Use TID  E11 65     • carvedilol (COREG) 3 125 mg tablet Take 3 125 mg by mouth 2 (two) times a day with meals     • ferrous sulfate 325 (65 Fe) mg tablet Take 1 tablet by mouth every other day     • furosemide (LASIX) 40 mg tablet Take 40 mg by mouth daily     • gabapentin (NEURONTIN) 300 mg capsule Take 300 mg by mouth 3 (three) times a day      • glimepiride (AMARYL) 4 mg tablet Take 4 mg by mouth 2 (two) times a day     • insulin degludec (TRESIBA) 100 units/mL injection pen Start with 14 units nightly, titrate up to 20 units nightly as directed   E 11 69     • loratadine (CLARITIN) 10 mg tablet Take 10 mg by mouth daily     • metFORMIN (GLUCOPHAGE) 500 mg tablet Take 500 mg by mouth 3 (three) times a day     • pantoprazole (PROTONIX) 40 mg tablet Take 40 mg by mouth daily     • sodium chloride (EAMON 128) 5 % hypertonic ophthalmic solution Administer 1 drop to both eyes every evening     • diazepam (VALIUM) 10 mg tablet TAKE 1 TABLET BY MOUTH 1 HOUR PRIOR TO PROCEDURE (Patient not taking: No sig reported)     • lactulose (CHRONULAC) 10 g/15 mL solution Take 20 g by mouth 3 (three) times a day (Patient not taking: Reported on 11/3/2021)     • Lidocaine-Menthol 3 6-1 25 % PTCH Apply 1 application topically daily (Patient not taking: Reported on 11/3/2021)     • MULTIPLE VITAMIN PO Take 1 tablet by mouth     • potassium chloride (Klor-Con) 10 mEq tablet Take 10 mEq by mouth 2 (two) times a day (Patient not taking: Reported on 11/16/2022)     • spironolactone (ALDACTONE) 100 mg tablet Take 100 mg by mouth daily (Patient not taking: Reported on 11/16/2022)     • traMADol (ULTRAM) 50 mg tablet Take 50 mg by mouth every 8 (eight) hours as needed (Patient not taking: Reported on 4/21/2022)       No current facility-administered medications for this visit  Allergies   Allergen Reactions   • Bee Pollen Other (See Comments)     Other reaction(s): Other (See Comments)   • Pollen Extract Other (See Comments)      Immunizations:     Immunization History   Administered Date(s) Administered   • COVID-19 MODERNA VACC 0 5 ML IM 03/24/2021, 04/21/2021, 12/08/2021   • COVID-19 Moderna Vac BIVALENT 18 Yr+ Im (BOOSTER ONLY) 11/08/2022   • INFLUENZA 02/24/2022, 09/30/2022   • Influenza, Seasonal Vaccine, Quadrivalent, Adjuvanted,  5e 02/24/2022, 09/30/2022      Health Maintenance:         Topic Date Due   • Hepatitis C Screening  Never done   • Breast Cancer Screening: Mammogram  Never done   • Colorectal Cancer Screening  Never done         Topic Date Due   • Hepatitis A Vaccine (1 of 2 - Risk 2-dose series) Never done   • Pneumococcal Vaccine: 65+ Years (1 - PCV) Never done   • Hepatitis B Vaccine (1 of 3 - Risk 3-dose series) Never done   • COVID-19 Vaccine (4 - Booster for Moderna series) 04/08/2022      Medicare Screening Tests and Risk Assessments: Doris Crowe is here for her Subsequent Wellness visit  Last Medicare Wellness visit information reviewed, patient interviewed, no change since last AWV  Health Risk Assessment:   Patient rates overall health as fair  Patient feels that their physical health rating is same  Patient is satisfied with their life  Eyesight was rated as slightly worse  Hearing was rated as same  Patient feels that their emotional and mental health rating is same  Patients states they are sometimes angry  Patient states they are sometimes unusually tired/fatigued  Pain experienced in the last 7 days has been a lot  Patient's pain rating has been 8/10  Patient states that she has experienced weight loss or gain in last 6 months  Depression Screening:   PHQ-2 Score: 0      Fall Risk Screening: In the past year, patient has experienced: no history of falling in past year      Urinary Incontinence Screening:   Patient has not leaked urine accidently in the last six months  Home Safety:  Patient has trouble with stairs inside or outside of their home  Patient has working smoke alarms and has working carbon monoxide detector  Home safety hazards include: none  Nutrition:   Current diet is Regular  Medications:   Patient is currently taking over-the-counter supplements  OTC medications include: see medication list  Patient is able to manage medications  Activities of Daily Living (ADLs)/Instrumental Activities of Daily Living (IADLs):   Walk and transfer into and out of bed and chair?: Yes  Dress and groom yourself?: Yes    Bathe or shower yourself?: No    Feed yourself?  Yes  Do your laundry/housekeeping?: No  Manage your money, pay your bills and track your expenses?: No  Make your own meals?: No    Do your own shopping?: No    Previous Hospitalizations:   Any hospitalizations or ED visits within the last 12 months?: Yes    How many hospitalizations have you had in the last year?: 1-2    Advance Care Planning:   Living will: No    Durable POA for healthcare: No      PREVENTIVE SCREENINGS      Cardiovascular Screening:    General: Screening Current      Diabetes Screening:     General: Screening Not Indicated and History Diabetes      Colorectal Cancer Screening:     General: Screening Current      Cervical Cancer Screening:    General: Screening Not Indicated      Lung Cancer Screening:     General: Screening Not Indicated    Screening, Brief Intervention, and Referral to Treatment (SBIRT)    Screening  Typical number of drinks in a day: 0  Typical number of drinks in a week: 0  Interpretation: Low risk drinking behavior  Single Item Drug Screening:  How often have you used an illegal drug (including marijuana) or a prescription medication for non-medical reasons in the past year? never    Single Item Drug Screen Score: 0  Interpretation: Negative screen for possible drug use disorder    No results found  Physical Exam:     /78 (BP Location: Right arm, Patient Position: Sitting, Cuff Size: Standard)   Pulse 81   Temp (!) 97 2 °F (36 2 °C)   Ht 4' 10 5" (1 486 m)   Wt 62 1 kg (137 lb)   SpO2 98%   BMI 28 15 kg/m²     Physical Exam  Constitutional:       General: She is not in acute distress  Appearance: Normal appearance  She is not ill-appearing  HENT:      Head: Normocephalic and atraumatic  Right Ear: Tympanic membrane, ear canal and external ear normal       Left Ear: Tympanic membrane, ear canal and external ear normal       Nose: Nose normal       Mouth/Throat:      Mouth: Mucous membranes are moist       Pharynx: Oropharynx is clear  No oropharyngeal exudate or posterior oropharyngeal erythema  Eyes:      General: No scleral icterus  Right eye: No discharge  Left eye: No discharge  Extraocular Movements: Extraocular movements intact  Conjunctiva/sclera: Conjunctivae normal       Pupils: Pupils are equal, round, and reactive to light  Cardiovascular:      Rate and Rhythm: Normal rate and regular rhythm  Pulses: Normal pulses  no weak pulses          Dorsalis pedis pulses are 2+ on the right side and 2+ on the left side  Posterior tibial pulses are 2+ on the right side and 2+ on the left side  Heart sounds: Normal heart sounds  No murmur heard  Pulmonary:      Effort: Pulmonary effort is normal  No respiratory distress  Breath sounds: Normal breath sounds     Abdominal:      General: Bowel sounds are normal       Palpations: Abdomen is soft  There is no shifting dullness or fluid wave  Tenderness: There is no abdominal tenderness  Musculoskeletal:         General: Normal range of motion  Cervical back: Normal range of motion and neck supple  Feet:      Right foot:      Skin integrity: No ulcer, skin breakdown, erythema, warmth, callus or dry skin  Left foot:      Skin integrity: No ulcer, skin breakdown, erythema, warmth, callus or dry skin  Lymphadenopathy:      Cervical: No cervical adenopathy  Skin:     General: Skin is warm and dry  Capillary Refill: Capillary refill takes less than 2 seconds  Neurological:      General: No focal deficit present  Mental Status: She is alert and oriented to person, place, and time  Mental status is at baseline  Cranial Nerves: No cranial nerve deficit  Psychiatric:         Mood and Affect: Mood normal           Patient's shoes and socks removed  Right Foot/Ankle   Right Foot Inspection  Skin Exam: skin normal and skin intact  No dry skin, no warmth, no callus, no erythema, no maceration, no abnormal color, no pre-ulcer, no ulcer and no callus  Toe Exam: ROM and strength within normal limits  No swelling, no tenderness, erythema and  no right toe deformity    Sensory   Monofilament testing: intact    Vascular  Capillary refills: < 3 seconds  The right DP pulse is 2+  The right PT pulse is 2+  Left Foot/Ankle  Left Foot Inspection  Skin Exam: skin normal and skin intact  No dry skin, no warmth, no erythema, no maceration, normal color, no pre-ulcer, no ulcer and no callus  Toe Exam: ROM and strength within normal limits  No swelling, no tenderness, no erythema and no left toe deformity  Sensory   Monofilament testing: intact    Vascular  Capillary refills: < 3 seconds  The left DP pulse is 2+  The left PT pulse is 2+       Assign Risk Category  No deformity present  No loss of protective sensation  No weak pulses  Risk: 0        Marylen Silence, MD

## 2022-11-16 NOTE — ASSESSMENT & PLAN NOTE
Is establish with a new GI doctor  It has been stable on Lasix and Coreg  I do recommend a low-dose Aldactone but will need to coordinate with her nephrologist as her renal function has been quite variable  She also states she does not take lactulose and has daily bowel movements and has not needed lactulose in some time  Discussed signs or symptoms to monitor for and to take if she ever becomes constipated and educate the  symptoms to watch for

## 2022-11-16 NOTE — ASSESSMENT & PLAN NOTE
Lab Results   Component Value Date    HGBA1C 6 3 11/16/2022     He gabapentin 300 mg 3 times daily and monitor renal function

## 2022-11-16 NOTE — TELEPHONE ENCOUNTER
Upon check out pt requested a referral - pt wants to switch endocrinologist (formerly seen @ [facility])  Chart reviewed  Discussed at the visit       Please advise

## 2022-11-16 NOTE — ASSESSMENT & PLAN NOTE
Lab Results   Component Value Date    HGBA1C 6 3 11/16/2022   Blood sugars overall well today on metformin glimepiride  Is not on a statin or an Ace  Will follow-up with urine microalbumin and foot exam complete    Follows with an outside provider for eye exams

## 2022-11-17 LAB
CREAT UR-MCNC: 16.3 MG/DL
MICROALBUMIN UR-MCNC: <5 MG/L (ref 0–20)
MICROALBUMIN/CREAT 24H UR: <31 MG/G CREATININE (ref 0–30)

## 2022-12-05 ENCOUNTER — TELEPHONE (OUTPATIENT)
Dept: OTHER | Facility: OTHER | Age: 72
End: 2022-12-05

## 2022-12-05 NOTE — TELEPHONE ENCOUNTER
Pt  Was told that Dr Nicki Rehman does not take her insurance [employer]  She called her insurance and they told her Dr Nicki Rehman does take their insurance  Please return the call to the patient or call Cinthia Bonds and they will help you

## 2022-12-07 ENCOUNTER — OFFICE VISIT (OUTPATIENT)
Dept: GASTROENTEROLOGY | Facility: CLINIC | Age: 72
End: 2022-12-07

## 2022-12-07 VITALS
HEART RATE: 95 BPM | HEIGHT: 59 IN | WEIGHT: 136.2 LBS | DIASTOLIC BLOOD PRESSURE: 62 MMHG | SYSTOLIC BLOOD PRESSURE: 130 MMHG | BODY MASS INDEX: 27.46 KG/M2 | OXYGEN SATURATION: 98 %

## 2022-12-07 DIAGNOSIS — Z86.010 HISTORY OF COLON POLYPS: ICD-10-CM

## 2022-12-07 DIAGNOSIS — K70.30 ALCOHOLIC CIRRHOSIS OF LIVER WITHOUT ASCITES (HCC): Primary | ICD-10-CM

## 2022-12-07 NOTE — PATIENT INSTRUCTIONS
Scheduled date of colonoscopy (as of today):5/3/23  Physician performing colonoscopy:Ivanna  Location of colonoscopy:[place]  Bowel prep reviewed with patient:Michelle/Miralax  Instructions reviewed with patient by:Ebenezer espinoza  Clearances:  none

## 2022-12-07 NOTE — PROGRESS NOTES
[facility] - Outpatient Consultation  Ora Chaney 81 y o  female MRN: [id number]  Encounter: [id number]          ASSESSMENT AND PLAN:      1  Alcoholic cirrhosis of liver without ascites (HCC)  - ascites:  None  - varices: small, non-bleeding seen on endoscopy 9/19/22  - encephalopathy:  None, hx of encephalopathy several years ago  - no longer taking lactulose due to diarrhea  - HCC screening: Negative on CT performed 9/13/22  - low sodium diet  - no alcohol  - f/u 6 months  - MELD score October 2022 was 11    2  History of colon polyps  - Colonoscopy; Future    ______________________________________________________________________    HPI: This requesting that I take over the care for her underlying cirrhosis  The cause of the cirrhosis is alcohol  She states that the last time she had alcohol was in the spring 2022  She states that she is dedicated to not drinking any additional alcohol  She wants to know if it was okay to have a small glass of champagne on New Year's Deirdre and I told her that this was okay  Her last esophagogastroduodenoscopy was performed September 19, 2022  Small nonbleeding varices was seen at that time  There is also some polyps of the stomach which were removed  She denies abdominal pain, nausea, vomiting, rectal bleeding, melena, heartburn, dysphagia, odynophagia, bloating, gaseousness  Is been at least 5 years since her last colonoscopy and at that time she was found to have a precancerous polyp  She was seeing a gastroenterologist at [facility] and she states that this gastroenterologist was not answering her questions, was not calling her back when she needed assistance, and she no longer wants to see this gastroenterologist     Liver enzyme panel on October 14, 2022 showed an AST of 17, ALT 18, total bili 0 5, alkaline phosphatase 59, albumin 3 2    Her pro time and INR in September 18, 2022 was 14 2 and 1 1      REVIEW OF SYSTEMS:    CONSTITUTIONAL: Denies any fever, chills, rigors, and weight loss  HEENT: No earache or tinnitus  Denies hearing loss or visual disturbances  CARDIOVASCULAR: No chest pain or palpitations  RESPIRATORY: Denies any cough, hemoptysis, shortness of breath or dyspnea on exertion  GASTROINTESTINAL: As noted in the History of Present Illness  GENITOURINARY: No problems with urination  Denies any hematuria or dysuria  NEUROLOGIC: No dizziness or vertigo, denies headaches  MUSCULOSKELETAL: Denies any muscle or joint pain  SKIN: Denies skin rashes or itching  ENDOCRINE: Denies excessive thirst  Denies intolerance to heat or cold  PSYCHOSOCIAL: Denies depression or anxiety  Denies any recent memory loss         Historical Information   Past Medical History:   Diagnosis Date   • Acute blood loss anemia 2/20/2021   • Acute embolism and thrombosis of other specified deep vein of right lower extremity ([facility] ) 15/40/0598   • ALC (alcoholic liver cirrhosis) ([facility] )    • Diabetes mellitus ([facility] )    • Edema 2/23/2021   • HLD (hyperlipidemia)    • Hypertension    • Neuropathy    • Neuropathy 2/20/2021   • Pleural effusion 2/20/2021   • Seasonal allergies      Past Surgical History:   Procedure Laterality Date   • BREAST BIOPSY     • CHOLECYSTECTOMY     • NASAL SEPTUM SURGERY       Social History   Social History     Substance and Sexual Activity   Alcohol Use Yes    Comment: RARE      Social History     Substance and Sexual Activity   Drug Use Yes   • Types: Marijuana    Comment:  unknown     Social History     Tobacco Use   Smoking Status Never   Smokeless Tobacco Never     Family History   Problem Relation Age of Onset   • Emphysema Mother    • Heart attack Father    • Heart disease Father        Meds/Allergies       Current Outpatient Medications:   •  Blood Glucose Monitoring Suppl ([employer] Blood Glucose Meter) w/Device KIT  •  carvedilol (COREG) 3 125 mg tablet  •  ferrous sulfate 325 (65 Fe) mg tablet  • furosemide (LASIX) 40 mg tablet  •  gabapentin (NEURONTIN) 300 mg capsule  •  glimepiride (AMARYL) 4 mg tablet  •  insulin degludec (TRESIBA) 100 units/mL injection pen  •  loratadine (CLARITIN) 10 mg tablet  •  metFORMIN (GLUCOPHAGE) 500 mg tablet  •  MULTIPLE VITAMIN PO  •  pantoprazole (PROTONIX) 40 mg tablet  •  potassium chloride (Klor-Con) 10 mEq tablet  •  sodium chloride (EAMON 128) 5 % hypertonic ophthalmic solution  •  diazepam (VALIUM) 10 mg tablet  •  lactulose (CHRONULAC) 10 g/15 mL solution  •  Lidocaine-Menthol 3 6-1 25 % PTCH  •  spironolactone (ALDACTONE) 100 mg tablet  •  traMADol (ULTRAM) 50 mg tablet    Allergies   Allergen Reactions   • Bee Pollen Other (See Comments)     Other reaction(s): Other (See Comments)   • Pollen Extract Other (See Comments)           Objective     Blood pressure 130/62, pulse 95, height 4' 10 5" (1 486 m), weight 61 8 kg (136 lb 3 2 oz), SpO2 98 %  Body mass index is 27 98 kg/m²  PHYSICAL EXAM:      General Appearance:   Alert, cooperative, no distress   HEENT:   Normocephalic, atraumatic, anicteric      Neck:  Supple, symmetrical, trachea midline   Lungs:   Clear to auscultation bilaterally; no rales, rhonchi or wheezing; respirations unlabored    Heart[de-identified]   Regular rate and rhythm; no murmur, rub, or gallop  Abdomen:   Soft, non-tender, non-distended; normal bowel sounds; no masses, no organomegaly    Genitalia:   Deferred    Rectal:   Deferred    Extremities:  No cyanosis, clubbing or edema    Pulses:  2+ and symmetric    Skin:  No jaundice, rashes, or lesions    Lymph nodes:  No palpable cervical lymphadenopathy        Lab Results:   No visits with results within 1 Day(s) from this visit  Latest known visit with results is:   Office Visit on 11/16/2022   Component Date Value   • Hemoglobin A1C 11/16/2022 6 3    • Creatinine, Ur 11/16/2022 16 3    • Microalbum  ,U,Random 11/16/2022 <5 0    • Microalb Creat Ratio 11/16/2022 <31 (H)          Radiology Results: No results found

## 2023-01-04 DIAGNOSIS — E11.42 DIABETIC PERIPHERAL NEUROPATHY (HCC): Primary | ICD-10-CM

## 2023-01-04 RX ORDER — GABAPENTIN 300 MG/1
300 CAPSULE ORAL 3 TIMES DAILY
Qty: 90 CAPSULE | Refills: 0 | Status: SHIPPED | OUTPATIENT
Start: 2023-01-04

## 2023-01-04 NOTE — TELEPHONE ENCOUNTER
T/c from pt -- states she is going to run out of gabapentin in about a month being she is taking an extra pill since there wasn't a new script called in they gave her the one capsule TID  Pt asking if there is a way Dr Palma Rausch can send it in for her       Please advise

## 2023-01-04 NOTE — TELEPHONE ENCOUNTER
Please remove PCP from pt's chart, she now sees Dr Carmen Bourne       Thank you,  Bianca Vazquez/dory  01/04/23  7:52 AM

## 2023-01-16 DIAGNOSIS — I10 PRIMARY HYPERTENSION: Primary | ICD-10-CM

## 2023-01-16 RX ORDER — CARVEDILOL 3.12 MG/1
3.12 TABLET ORAL 2 TIMES DAILY WITH MEALS
Qty: 180 TABLET | Refills: 1 | Status: SHIPPED | OUTPATIENT
Start: 2023-01-16

## 2023-02-10 ENCOUNTER — RA CDI HCC (OUTPATIENT)
Dept: OTHER | Facility: HOSPITAL | Age: 73
End: 2023-02-10

## 2023-02-10 NOTE — PROGRESS NOTES
E11 22, K70 30 for 2023  [facility]  coding opportunities          Chart Reviewed number of suggestions sent to Provider: 2     Patients Insurance     Medicare Insurance: [employer]

## 2023-02-14 DIAGNOSIS — E11.42 DIABETIC PERIPHERAL NEUROPATHY (HCC): ICD-10-CM

## 2023-02-14 DIAGNOSIS — K70.30 ALCOHOLIC CIRRHOSIS OF LIVER WITHOUT ASCITES (HCC): Primary | ICD-10-CM

## 2023-02-15 RX ORDER — FUROSEMIDE 40 MG/1
40 TABLET ORAL DAILY
Qty: 90 TABLET | Refills: 1 | Status: SHIPPED | OUTPATIENT
Start: 2023-02-15

## 2023-02-15 RX ORDER — GABAPENTIN 300 MG/1
300 CAPSULE ORAL 3 TIMES DAILY
Qty: 270 CAPSULE | Refills: 1 | Status: SHIPPED | OUTPATIENT
Start: 2023-02-15 | End: 2023-02-17 | Stop reason: SDUPTHER

## 2023-02-16 RX ORDER — BLOOD-GLUCOSE METER
EACH MISCELLANEOUS
COMMUNITY
Start: 2023-01-03

## 2023-02-16 RX ORDER — BLOOD-GLUCOSE TRANSMITTER
EACH MISCELLANEOUS
COMMUNITY
Start: 2023-01-10

## 2023-02-16 RX ORDER — BLOOD-GLUCOSE,RECEIVER,CONT
EACH MISCELLANEOUS
COMMUNITY
Start: 2023-01-10

## 2023-02-16 RX ORDER — BLOOD-GLUCOSE SENSOR
EACH MISCELLANEOUS
COMMUNITY
Start: 2023-01-10

## 2023-02-17 ENCOUNTER — OFFICE VISIT (OUTPATIENT)
Dept: FAMILY MEDICINE CLINIC | Facility: CLINIC | Age: 73
End: 2023-02-17

## 2023-02-17 VITALS
OXYGEN SATURATION: 98 % | HEART RATE: 80 BPM | TEMPERATURE: 97.6 F | WEIGHT: 141 LBS | DIASTOLIC BLOOD PRESSURE: 70 MMHG | SYSTOLIC BLOOD PRESSURE: 134 MMHG | HEIGHT: 59 IN | BODY MASS INDEX: 28.43 KG/M2

## 2023-02-17 DIAGNOSIS — N18.30 STAGE 3 CHRONIC KIDNEY DISEASE, UNSPECIFIED WHETHER STAGE 3A OR 3B CKD (HCC): ICD-10-CM

## 2023-02-17 DIAGNOSIS — E11.42 DIABETIC PERIPHERAL NEUROPATHY (HCC): ICD-10-CM

## 2023-02-17 DIAGNOSIS — E11.9 DIABETES MELLITUS TYPE 2 IN NONOBESE (HCC): Primary | ICD-10-CM

## 2023-02-17 DIAGNOSIS — I85.11 SECONDARY ESOPHAGEAL VARICES WITH BLEEDING (HCC): ICD-10-CM

## 2023-02-17 DIAGNOSIS — K70.30 ALCOHOLIC CIRRHOSIS OF LIVER WITHOUT ASCITES (HCC): ICD-10-CM

## 2023-02-17 DIAGNOSIS — F10.11 HISTORY OF ALCOHOL ABUSE: ICD-10-CM

## 2023-02-17 DIAGNOSIS — F11.20 CONTINUOUS OPIOID DEPENDENCE (HCC): ICD-10-CM

## 2023-02-17 RX ORDER — TRAMADOL HYDROCHLORIDE 50 MG/1
50 TABLET ORAL DAILY PRN
Qty: 10 TABLET | Refills: 0 | Status: SHIPPED | OUTPATIENT
Start: 2023-02-17

## 2023-02-17 RX ORDER — NALOXONE HYDROCHLORIDE 4 MG/.1ML
SPRAY NASAL
Qty: 1 EACH | Refills: 1 | Status: SHIPPED | OUTPATIENT
Start: 2023-02-17

## 2023-02-17 RX ORDER — PANTOPRAZOLE SODIUM 40 MG/1
40 TABLET, DELAYED RELEASE ORAL DAILY
Status: CANCELLED | OUTPATIENT
Start: 2023-02-17

## 2023-02-17 RX ORDER — GABAPENTIN 300 MG/1
600 CAPSULE ORAL 3 TIMES DAILY
Qty: 540 CAPSULE | Refills: 1 | Status: SHIPPED | OUTPATIENT
Start: 2023-02-17 | End: 2023-02-23 | Stop reason: SDUPTHER

## 2023-02-17 RX ORDER — PANTOPRAZOLE SODIUM 40 MG/1
40 TABLET, DELAYED RELEASE ORAL DAILY
Qty: 90 TABLET | Refills: 1 | Status: SHIPPED | OUTPATIENT
Start: 2023-02-17 | End: 2023-02-23 | Stop reason: SDUPTHER

## 2023-02-17 RX ORDER — TRAMADOL HYDROCHLORIDE 50 MG/1
50 TABLET ORAL EVERY 8 HOURS PRN
Status: CANCELLED | OUTPATIENT
Start: 2023-02-17

## 2023-02-17 NOTE — ASSESSMENT & PLAN NOTE
Lab Results   Component Value Date    HGBA1C 7 5 (H) 01/03/2023       Go[facility] to endocrine    [facility]

## 2023-02-17 NOTE — PROGRESS NOTES
Assessment/Plan:         Problem List Items Addressed This Visit        Digestive    Alcoholic cirrhosis of liver without ascites ([facility] [address] )     Establish with a GI doctor  Continued cessation is on Lasix and beta-blocker  Relevant Medications    pantoprazole (PROTONIX) 40 mg tablet    Secondary esophageal varices with bleeding (HCC)    Relevant Medications    pantoprazole (PROTONIX) 40 mg tablet       Endocrine    Diabetes mellitus type 2 in nonobese [facility]) - Primary       Lab Results   Component Value Date    HGBA1C 7 5 (H) 01/03/2023       Goes to endocrine    Goes to [facility] eye for eye exams          Diabetic peripheral neuropathy (HCC)    Relevant Medications    traMADol (ULTRAM) 50 mg tablet    gabapentin (NEURONTIN) 300 mg capsule       Genitourinary    Stage 3 chronic kidney disease, unspecified whether stage 3a or 3b CKD (HCC)       Other    History of alcohol abuse     conitnue cessation         Relevant Medications    pantoprazole (PROTONIX) 40 mg tablet    Continuous opioid dependence (HCC)    Relevant Medications    traMADol (ULTRAM) 50 mg tablet    naloxone (NARCAN) 4 mg/0 1 mL nasal spray         Subjective:      Patient ID: Ute Michaud is a 81 y o  female  HPI    coming in for follow up    Takes tramadol as needed fr neuropathy pain, takes about 1 pill a month on average    Gabapentin, was taking 2 capsules TID   600mg TID works well    The following portions of the patient's history were reviewed and updated as appropriate:   Past Medical History:  She has a past medical history of Acute blood loss anemia (2/20/2021), Acute embolism and thrombosis of other specified deep vein of right lower extremity ([address] ) (24/02/1249), ALC (alcoholic liver cirrhosis) ([facility] [address] ), Diabetes mellitus ([facility] [address] ), Edema (2/23/2021), HLD (hyperlipidemia), Hypertension, Neuropathy, Neuropathy (2/20/2021), Pleural effusion (2/20/2021), and Seasonal allergies  ,  _______________________________________________________________________  Medical Problems:  does not have any pertinent problems on file ,  _______________________________________________________________________  Past Surgical History:   has a past surgical history that includes Breast biopsy; Cholecystectomy; and Nasal septum surgery  ,  _______________________________________________________________________  Family History:  family history includes Emphysema in her mother; Heart attack in her father; Heart disease in her father ,  _______________________________________________________________________  Social History:   reports that she has never smoked  She has never used smokeless tobacco  She reports current alcohol use  She reports current drug use  Drug: Marijuana  ,  _______________________________________________________________________  Allergies:  is allergic to bee pollen and pollen extract     _______________________________________________________________________  Current Outpatient Medications   Medication Sig Dispense Refill   • Blood Glucose Monitoring Suppl ([employer] Blood Glucose Meter) w/Device KIT OneTouch Ultra  Use TID  E11 65     • carvedilol (COREG) 3 125 mg tablet Take 1 tablet (3 125 mg total) by mouth 2 (two) times a day with meals 180 tablet 1   • Continuous Blood Gluc  (Dexcom G6 ) SOURAV Use as directed  E11 65     • Continuous Blood Gluc Sensor (Dexcom G6 Sensor) MISC Use every 10 days  E11 65     • Continuous Blood Gluc Transmit (Dexcom G6 Transmitter) MISC Change every 3 months   E11 65     • ferrous sulfate 325 (65 Fe) mg tablet Take 1 tablet by mouth every other day     • furosemide (LASIX) 40 mg tablet Take 1 tablet (40 mg total) by mouth daily 90 tablet 1   • gabapentin (NEURONTIN) 300 mg capsule Take 2 capsules (600 mg total) by mouth 3 (three) times a day 540 capsule 1   • glimepiride (AMARYL) 4 mg tablet Take 4 mg by mouth 2 (two) times a day     • glucose blood test strip Use     • insulin degludec (TRESIBA) 100 units/mL injection pen Start with 14 units nightly, titrate up to 20 units nightly as directed  E 11 69     • loratadine (CLARITIN) 10 mg tablet Take 10 mg by mouth daily     • metFORMIN (GLUCOPHAGE) 500 mg tablet Take 500 mg by mouth 3 (three) times a day     • MULTIPLE VITAMIN PO Take 1 tablet by mouth     • naloxone (NARCAN) 4 mg/0 1 mL nasal spray Administer 1 spray into a nostril  If no response after 2-3 minutes, give another dose in the other nostril using a new spray  1 each 1   • pantoprazole (PROTONIX) 40 mg tablet Take 1 tablet (40 mg total) by mouth daily 90 tablet 1   • sodium chloride (EAMON 128) 5 % hypertonic ophthalmic solution Administer 1 drop to both eyes every evening     • traMADol (ULTRAM) 50 mg tablet Take 1 tablet (50 mg total) by mouth daily as needed for severe pain 10 tablet 0   • diazepam (VALIUM) 10 mg tablet TAKE 1 TABLET BY MOUTH 1 HOUR PRIOR TO PROCEDURE (Patient not taking: No sig reported)       No current facility-administered medications for this visit      _______________________________________________________________________  Review of Systems   Constitutional: Negative for chills, fatigue and fever  HENT: Negative for rhinorrhea and sore throat  Eyes: Negative for visual disturbance  Respiratory: Negative for cough and shortness of breath  Cardiovascular: Negative for chest pain and palpitations  Gastrointestinal: Negative for abdominal pain, constipation, diarrhea, nausea and vomiting  Genitourinary: Negative for difficulty urinating, dysuria and frequency  Musculoskeletal: Negative for arthralgias and myalgias  Skin: Negative for color change and rash  Neurological: Negative for weakness and headaches           Objective:  Vitals:    02/17/23 1517   BP: 134/70   BP Location: Left arm   Patient Position: Sitting   Cuff Size: Standard   Pulse: 80   Temp: 97 6 °F (36 4 °C)   SpO2: 98%   Weight: 64 kg (141 lb)   Height: 4' 10 5" (1 486 m)     Body mass index is 28 97 kg/m²  Physical Exam  Constitutional:       General: She is not in acute distress  Appearance: She is not ill-appearing  HENT:      Head: Normocephalic and atraumatic  Right Ear: External ear normal       Left Ear: External ear normal       Nose: Nose normal  No congestion or rhinorrhea  Mouth/Throat:      Mouth: Mucous membranes are moist       Pharynx: Oropharynx is clear  No oropharyngeal exudate or posterior oropharyngeal erythema  Eyes:      Extraocular Movements: Extraocular movements intact  Conjunctiva/sclera: Conjunctivae normal       Pupils: Pupils are equal, round, and reactive to light  Cardiovascular:      Rate and Rhythm: Normal rate and regular rhythm  Pulses: Normal pulses  Heart sounds: No murmur heard  Pulmonary:      Effort: Pulmonary effort is normal  No respiratory distress  Breath sounds: Normal breath sounds  No wheezing  Chest:      Chest wall: No tenderness  Abdominal:      General: Bowel sounds are normal       Palpations: Abdomen is soft  Tenderness: There is no abdominal tenderness  Musculoskeletal:         General: Normal range of motion  Cervical back: Normal range of motion  Skin:     General: Skin is warm and dry  Capillary Refill: Capillary refill takes less than 2 seconds  Findings: No rash  Neurological:      General: No focal deficit present  Mental Status: She is alert  Mental status is at baseline  Gait: Gait abnormal (slow antalgic, uses cane)

## 2023-02-21 ENCOUNTER — CONSULT (OUTPATIENT)
Dept: ENDOCRINOLOGY | Age: 73
End: 2023-02-21

## 2023-02-21 ENCOUNTER — TELEPHONE (OUTPATIENT)
Dept: FAMILY MEDICINE CLINIC | Facility: CLINIC | Age: 73
End: 2023-02-21

## 2023-02-21 VITALS
WEIGHT: 141.8 LBS | HEART RATE: 82 BPM | SYSTOLIC BLOOD PRESSURE: 124 MMHG | HEIGHT: 59 IN | OXYGEN SATURATION: 97 % | DIASTOLIC BLOOD PRESSURE: 58 MMHG | TEMPERATURE: 97.8 F | BODY MASS INDEX: 28.59 KG/M2

## 2023-02-21 DIAGNOSIS — N18.30 STAGE 3 CHRONIC KIDNEY DISEASE, UNSPECIFIED WHETHER STAGE 3A OR 3B CKD (HCC): ICD-10-CM

## 2023-02-21 DIAGNOSIS — E11.9 DIABETES MELLITUS TYPE 2 IN NONOBESE (HCC): ICD-10-CM

## 2023-02-21 DIAGNOSIS — K70.30 ALCOHOLIC CIRRHOSIS OF LIVER WITHOUT ASCITES (HCC): ICD-10-CM

## 2023-02-21 DIAGNOSIS — E11.42 DIABETIC PERIPHERAL NEUROPATHY (HCC): Primary | ICD-10-CM

## 2023-02-21 DIAGNOSIS — J30.2 SEASONAL ALLERGIES: Primary | ICD-10-CM

## 2023-02-21 DIAGNOSIS — E04.2 MULTIPLE THYROID NODULES: ICD-10-CM

## 2023-02-21 RX ORDER — LORATADINE 10 MG/1
10 TABLET ORAL DAILY
Qty: 90 TABLET | Refills: 1 | Status: SHIPPED | OUTPATIENT
Start: 2023-02-21 | End: 2023-02-23 | Stop reason: SDUPTHER

## 2023-02-21 NOTE — PROGRESS NOTES
Jocelyn Chaney 81 y o  female MRN: [id number]    Encounter: [id number]      Assessment/Plan     Assessment: This is a 81y o -year-old female who is referred to us as a consult for    1-T2DM: she has been diagnosed with T2DM in 2010  She denies having h/o pancreatitis but has alcoholic liver cirrhosis with bleeding eosophageal varices   She monitors her BS x2/d; FBS is usually < 130 but mid day BS surge especially in past few weeks  She was on trulicity and discontinued due to the high price  Her A1c has increased from 6 5 % in 11/22 to 7 5 % recently  She has one time hospital admission for hypoglycemia and esophageal bleeding with acute renal failure  Her options are : continue current regimen but with higher dose of metformin with the caution that in context of hepatic failure ( which she is not in it now) one should expect the risk of lactic acidosis  I have to double check with Dr Johns Found about use of trulicity and esophageal varices  Or adding farxiga ( if she can afford)  2-h/o MNG and one time biopsy with (-) result  She has no local symptoms and seems euthyroid  3-cirrhosis: per Dr Johns Found    4-h/o alcoholism and spine Fx: I wonder if her DXA scan been checked and I leave this to her PCP  Plan:  All meds same for time being   But increase metformin to 1500 mg/d  Well hydration   DEXCOM and let me know about BS log  RTV in 4 months with A1c, CMP,TSH, urine alb/cr    CC: Diabetes    History of Present Illness     HPI:  T2DM, MNG     Review of Systems   Constitutional: Negative for appetite change, fatigue and unexpected weight change  HENT: Negative for trouble swallowing  Eyes: Negative for visual disturbance  Respiratory: Negative for cough, chest tightness and shortness of breath  Cardiovascular: Negative for chest pain, palpitations and leg swelling     Gastrointestinal: Negative for abdominal distention, abdominal pain, blood in stool, constipation, diarrhea, nausea and vomiting  Endocrine: Positive for polyphagia  Negative for polyuria  Genitourinary: Negative for dysuria and frequency  Musculoskeletal: Positive for gait problem  She is fall prone(+)  Skin: Negative for rash and wound  Neurological: Positive for numbness  Negative for weakness and headaches  Feet numbness(+)   Hematological: Does not bruise/bleed easily  Psychiatric/Behavioral: Negative for confusion  Historical Information   Past Medical History:   Diagnosis Date   • Acute blood loss anemia 2/20/2021   • Acute embolism and thrombosis of other specified deep vein [facility] right lower [facility] 82/68/9469   • ALC (alcoholic liver cirrhosis) ([facility] [address] )    • Diabetes mellitus ([facility] )    • Edema 2/23/2021   • HLD (hyperlipidemia)    • Hypertension    • Neuropathy    • Neuropathy 2/20/2021   • Pleural effusion 2/20/2021   • Seasonal allergies      Past Surgical History:   Procedure Laterality Date   • BREAST BIOPSY     • CHOLECYSTECTOMY     • NASAL SEPTUM SURGERY       Social History   Social History     Substance and Sexual Activity   Alcohol Use Yes    Comment: RARE      Social History     Substance and Sexual Activity   Drug Use Yes   • Types: Marijuana    Comment:  unknown     Social History     Tobacco Use   Smoking Status Never   Smokeless Tobacco Never     Family History:   Family History   Problem Relation Age of Onset   • Emphysema Mother    • Heart attack Father    • Heart disease Father        Meds/Allergies   Current Outpatient Medications   Medication Sig Dispense Refill   • Blood Glucose Monitoring Suppl ([employer] Blood Glucose Meter) w/Device KIT OneTouch Ultra  Use TID  E11 65     • carvedilol (COREG) 3 125 mg tablet Take 1 tablet (3 125 mg total) by mouth 2 (two) times a day with meals 180 tablet 1   • Continuous Blood Gluc  (Dexcom G6 ) SOURAV Use as directed  E11 65     • Continuous Blood Gluc Sensor (Dexcom G6 Sensor) MISC Use every 10 days  E11 65     • Continuous Blood Gluc Transmit (Dexcom G6 Transmitter) MISC Change every 3 months  E11 65     • diazepam (VALIUM) 10 mg tablet TAKE 1 TABLET BY MOUTH 1 HOUR PRIOR TO PROCEDURE     • ferrous sulfate 325 (65 Fe) mg tablet Take 1 tablet by mouth every other day     • furosemide (LASIX) 40 mg tablet Take 1 tablet (40 mg total) by mouth daily 90 tablet 1   • gabapentin (NEURONTIN) 300 mg capsule Take 2 capsules (600 mg total) by mouth 3 (three) times a day 540 capsule 1   • glimepiride (AMARYL) 4 mg tablet Take 4 mg by mouth 2 (two) times a day     • glucose blood test strip Use     • insulin degludec (TRESIBA) 100 units/mL injection pen 16 Units 16 units at bedtime     • loratadine (CLARITIN) 10 mg tablet Take 1 tablet (10 mg total) by mouth daily 90 tablet 1   • metFORMIN (GLUCOPHAGE) 500 mg tablet Take 500 mg by mouth 3 (three) times a day     • MULTIPLE VITAMIN PO Take 1 tablet by mouth     • naloxone (NARCAN) 4 mg/0 1 mL nasal spray Administer 1 spray into a nostril  If no response after 2-3 minutes, give another dose in the other nostril using a new spray  1 each 1   • pantoprazole (PROTONIX) 40 mg tablet Take 1 tablet (40 mg total) by mouth daily 90 tablet 1   • sodium chloride (EAMON 128) 5 % hypertonic ophthalmic solution Administer 1 drop to both eyes every evening     • traMADol (ULTRAM) 50 mg tablet Take 1 tablet (50 mg total) by mouth daily as needed for severe pain 10 tablet 0     No current facility-administered medications for this visit  Allergies   Allergen Reactions   • Bee Pollen Other (See Comments)     Other reaction(s): Other (See Comments)   • Pollen Extract Other (See Comments)       Objective   Vitals: Blood pressure 124/58, pulse 82, temperature 97 8 °F (36 6 °C), temperature source Temporal, height 4' 10 5" (1 486 m), weight 64 3 kg (141 lb 12 8 oz), SpO2 97 %  Physical Exam  Vitals reviewed  Constitutional:       Appearance: She is obese     HENT:      Mouth/Throat: Comments: Edentulous(+)  Eyes:      Extraocular Movements: Extraocular movements intact  Neck:      Thyroid: No thyromegaly  Vascular: No carotid bruit  Comments: Indiscrete deep sitting nodules ( 10 to 15 mm  Cardiovascular:      Rate and Rhythm: Normal rate and regular rhythm  Pulses: Normal pulses  Dorsalis pedis pulses are 2+ on the right side and 2+ on the left side  Heart sounds: Normal heart sounds  No murmur heard  Pulmonary:      Breath sounds: Normal breath sounds  No wheezing or rales  Abdominal:      General: There is distension  Palpations: Abdomen is soft  There is no mass  Tenderness: There is no abdominal tenderness  Hernia: No hernia is present  Comments: ? Collateral(+)   Musculoskeletal:      Right lower leg: No edema  Left lower leg: No edema  Right foot: No deformity  Left foot: No deformity  Feet:    Feet:      Right foot:      Protective Sensation: 6 sites tested  0 sites sensed  Skin integrity: Skin integrity normal       Toenail Condition: Right toenails are normal       Left foot:      Protective Sensation: 6 sites tested  0 sites sensed  Skin integrity: Skin breakdown present  Toenail Condition: Left toenails are normal       Comments: Very small healing denuded skin on left 4th toe  Lymphadenopathy:      Cervical: No cervical adenopathy  Skin:     Findings: No rash  Neurological:      General: No focal deficit present  Mental Status: She is oriented to person, place, and time  Cranial Nerves: No cranial nerve deficit  Sensory: No sensory deficit  Psychiatric:         Mood and Affect: Mood normal          Behavior: Behavior normal          Thought Content: Thought content normal          The history was obtained from the review of the chart, patient      Lab Results:   Lab Results   Component Value Date/Time    Hemoglobin A1C 7 5 (H) 01/03/2023 11:55 AM    Hemoglobin A1C 6 3 11/16/2022 02:06 PM    Hemoglobin A1C 6 5 (H) 07/25/2022 01:51 PM           Imaging Studies: I have personally reviewed pertinent reports  Portions of the record may have been created with voice recognition software  Occasional wrong word or "sound a like" substitutions may have occurred due to the inherent limitations of voice recognition software  Read the chart carefully and recognize, using context, where substitutions have occurred

## 2023-02-21 NOTE — TELEPHONE ENCOUNTER
Refilled   Narcan is an automatic refill if a patient is on a narcotic in this state, if patient becomes lethargic/unresponsive, her  should use narcan on her and call ems

## 2023-02-21 NOTE — TELEPHONE ENCOUNTER
Pt calling - saw you on 02/17/2023 - requested a new script for loratadine (CLARITIN) 10 mg tab for her allergies  Pt is asking why a script for naloxone (NARCAN) 4 mg/0 1 mL nasal spray has been sent in for her? Pt not familiar with that type / name of a nasal spray, never had it rx'd       Please advise

## 2023-02-21 NOTE — PATIENT INSTRUCTIONS
Please send me your [facility] data in 2-3 weeks  Metformin 500 mg three tabs daily   Keep yourself well hydrated and if you get diarrhea with metformin let me know and I will reduce the dose back to two tabs daily  Continue rest of meds same  Call me if you have issues to get your tresiba

## 2023-02-22 ENCOUNTER — TELEPHONE (OUTPATIENT)
Dept: ADMINISTRATIVE | Facility: OTHER | Age: 73
End: 2023-02-22

## 2023-02-22 ENCOUNTER — TELEPHONE (OUTPATIENT)
Dept: OTHER | Facility: OTHER | Age: 73
End: 2023-02-22

## 2023-02-22 ENCOUNTER — TELEPHONE (OUTPATIENT)
Dept: ENDOCRINOLOGY | Facility: CLINIC | Age: 73
End: 2023-02-22

## 2023-02-22 ENCOUNTER — TELEPHONE (OUTPATIENT)
Dept: FAMILY MEDICINE CLINIC | Facility: CLINIC | Age: 73
End: 2023-02-22

## 2023-02-22 NOTE — TELEPHONE ENCOUNTER
Routing to pa   FYI  In Care Everywhere     Ref Range & Units 1 d ago Comments  Hgb 11 5 - 14 5 g/dL 9 1 Low     Hematocrit 35 0 - 43 0 % 27 5 Low     WBC  4 0 - 10 0 thou/cmm 3 5 Low     RBC 3 70 - 4 70 mill/cmm 3 61 Low     Platelets 966 - 400 thou/cmm 10 Low Panic     MPV 7 5 - 11 3 fL 10 0    MCV 80 - 100 fL 76 Low     MCH 26 0 - 34 0 pg 25 2 Low     MCHC 32 0 - 37 0 g/dL 33 0    RDW 12 0 - 16 0 % 13 9

## 2023-02-22 NOTE — LETTER
Diabetic Eye Exam Form    Date Requested: 23  Patient: Sonu Chaney  Patient : 1950   Referring Provider: Veronica Silver MD      DIABETIC Eye Exam Date _______________________________      Type of Exam MUST be documented for Diabetic Eye Exams  Please CHECK ONE  Retinal Exam       Dilated Retinal Exam       OCT       Optomap-Iris Exam      Fundus Photography       Left Eye - Please check Retinopathy or No Retinopathy        Exam did show retinopathy    Exam did not show retinopathy       Right Eye - Please check Retinopathy or No Retinopathy       Exam did show retinopathy    Exam did not show retinopathy       Comments __________________________________________________________    Practice Providing Exam ______________________________________________    Exam Performed By (print name) _______________________________________      Provider Signature ___________________________________________________      These reports are needed for  compliance  Please fax this completed form and a copy of the Diabetic Eye Exam report to our office located at [place] [address] as soon as possible via Fax [phone] attention Rigoberto Naranjo: Phone [phone]  We thank you for your assistance in treating our mutual patient

## 2023-02-22 NOTE — TELEPHONE ENCOUNTER
Platelets are 10 as of endo - will go to ER tomorrow - wants dr Mohini Wilhelm to know and tell her if they are going to keep her overnight

## 2023-02-22 NOTE — TELEPHONE ENCOUNTER
Patient called to inform Dr Umesh Carranza that she needs to go into the emergency room tomorrow morning since her platelets are low

## 2023-02-22 NOTE — TELEPHONE ENCOUNTER
----- Message from Yaneth Dodson sent at 2/21/2023  3:30 PM EST -----  Regarding: care gap request eye exam  02/21/23 3:30 PM    Hello, our patient attached above has had Diabetic Eye Exam completed/performed  Please assist in updating the patient chart by making an External outreach to [facility] facility located in [place]  The date of service is within the last year      Thank you,  Yaneth Dodson   [facility] [address]

## 2023-02-23 DIAGNOSIS — J30.2 SEASONAL ALLERGIES: ICD-10-CM

## 2023-02-23 DIAGNOSIS — K70.30 ALCOHOLIC CIRRHOSIS OF LIVER WITHOUT ASCITES (HCC): ICD-10-CM

## 2023-02-23 DIAGNOSIS — E11.42 DIABETIC PERIPHERAL NEUROPATHY (HCC): ICD-10-CM

## 2023-02-23 DIAGNOSIS — F10.11 HISTORY OF ALCOHOL ABUSE: ICD-10-CM

## 2023-02-23 NOTE — TELEPHONE ENCOUNTER
Pt calling -     Wanted to know why Dr Lorenzo prescribed naloxone (NARCAN) 4 mg/0 1 mL nasal spray for her - Pt never heard of that typ of spray before  Pt is refusing to  meds it would cost her $42 00  Pt spoke with the pharmacist, was told Narcan Nasal Spray is used to treat an opioid overdose in an emergency situation  Pt was advised  to contact provider for clarification  Also pt is asking to resent scripts for :   loratadine (CLARITIN) 10 mg tablet   pantoprazole (PROTONIX) 40 mg tablet   gabapentin (NEURONTIN) 300 mg capsule   Notes her pharmacy did not recived script sent in on 02/17/2023    Refills attached       Please advise

## 2023-02-23 NOTE — TELEPHONE ENCOUNTER
Upon review of the In Basket request and the patient's chart, initial outreach has been made via fax to facility  Please see Contacts section for details       Thank you  Naga Phelps

## 2023-02-23 NOTE — TELEPHONE ENCOUNTER
Upon review of the In Basket request we were able to locate, review, and update the patient chart as requested for Diabetic Eye Exam  [facility]    Any additional questions or concerns should be emailed to the Practice Liaisons via the appropriate education email address, please do not reply via In Basket      Thank you  Mukesh Austin

## 2023-02-24 RX ORDER — LORATADINE 10 MG/1
10 TABLET ORAL DAILY
Qty: 90 TABLET | Refills: 1 | Status: SHIPPED | OUTPATIENT
Start: 2023-02-24

## 2023-02-24 RX ORDER — GABAPENTIN 300 MG/1
600 CAPSULE ORAL 3 TIMES DAILY
Qty: 540 CAPSULE | Refills: 1 | Status: SHIPPED | OUTPATIENT
Start: 2023-02-24 | End: 2023-05-25

## 2023-02-24 RX ORDER — PANTOPRAZOLE SODIUM 40 MG/1
40 TABLET, DELAYED RELEASE ORAL DAILY
Qty: 90 TABLET | Refills: 1 | Status: SHIPPED | OUTPATIENT
Start: 2023-02-24

## 2023-02-27 NOTE — TELEPHONE ENCOUNTER
Pt notified and agrees       E-Prescribing Status: Receipt confirmed by pharmacy (2/24/2023  9:57 AM EST)

## 2023-03-01 ENCOUNTER — HOSPITAL ENCOUNTER (OUTPATIENT)
Dept: BONE DENSITY | Facility: CLINIC | Age: 73
Discharge: HOME | End: 2023-03-01

## 2023-03-01 DIAGNOSIS — Z78.0 ASYMPTOMATIC POSTMENOPAUSAL STATE: ICD-10-CM

## 2023-03-02 ENCOUNTER — TELEPHONE (OUTPATIENT)
Dept: FAMILY MEDICINE CLINIC | Facility: CLINIC | Age: 73
End: 2023-03-02

## 2023-03-02 DIAGNOSIS — E11.9 DIABETES MELLITUS TYPE 2 IN NONOBESE (HCC): Primary | ICD-10-CM

## 2023-03-03 ENCOUNTER — TELEPHONE (OUTPATIENT)
Dept: FAMILY MEDICINE CLINIC | Facility: CLINIC | Age: 73
End: 2023-03-03

## 2023-03-03 NOTE — TELEPHONE ENCOUNTER
T/c from pt -- pt is getting a stair lift in her home and states she can get a discount if Dr Laura Garcia would be willing to write a letter for her stating she needs this to be able to get upstairs  Pt states she has not been able to climb her stairs since 2015  Pt states they are coming to her house on 3/20 and is asking to have the letter mailed to her home  Please advise

## 2023-03-06 DIAGNOSIS — E11.9 DIABETES MELLITUS TYPE 2 IN NONOBESE (HCC): ICD-10-CM

## 2023-03-06 NOTE — TELEPHONE ENCOUNTER
Patient called the [place] office because she could not reach anyone at your office  She needs a refill of her [place]  I called the pharmacy and they never got the refill that was sent on 3/2/23

## 2023-03-07 DIAGNOSIS — E11.9 DIABETES MELLITUS TYPE 2 IN NONOBESE (HCC): Primary | ICD-10-CM

## 2023-03-08 RX ORDER — GLIMEPIRIDE 4 MG/1
4 TABLET ORAL 2 TIMES DAILY
Qty: 120 TABLET | Refills: 3 | Status: SHIPPED | OUTPATIENT
Start: 2023-03-08 | End: 2023-03-09

## 2023-03-09 RX ORDER — GLIMEPIRIDE 4 MG/1
4 TABLET ORAL 2 TIMES DAILY
Qty: 120 TABLET | Refills: 3 | Status: SHIPPED | OUTPATIENT
Start: 2023-03-09 | End: 2023-03-16 | Stop reason: SDUPTHER

## 2023-03-09 NOTE — TELEPHONE ENCOUNTER
Pt should drop off info in the office, unsure if this is legit, any specific requirements she may or may not meet, etc

## 2023-03-09 NOTE — TELEPHONE ENCOUNTER
Spoke with pt -- appt for the lift was moved to Saturday -- pt is going to try to get pprwk for Dr Philip Jean when the company comes to the house

## 2023-03-09 NOTE — TELEPHONE ENCOUNTER
T/c from pt -- script was sent to [facility], pt is requesting it be resent to [facility]

## 2023-03-15 ENCOUNTER — TELEPHONE (OUTPATIENT)
Dept: FAMILY MEDICINE CLINIC | Facility: CLINIC | Age: 73
End: 2023-03-15

## 2023-03-15 LAB
LEFT EYE DIABETIC RETINOPATHY: POSITIVE
RIGHT EYE DIABETIC RETINOPATHY: POSITIVE

## 2023-03-16 DIAGNOSIS — E11.42 DIABETIC PERIPHERAL NEUROPATHY (HCC): ICD-10-CM

## 2023-03-16 DIAGNOSIS — E11.9 DIABETES MELLITUS TYPE 2 IN NONOBESE (HCC): ICD-10-CM

## 2023-03-16 RX ORDER — GABAPENTIN 300 MG/1
600 CAPSULE ORAL 3 TIMES DAILY
Qty: 540 CAPSULE | Refills: 1 | Status: SHIPPED | OUTPATIENT
Start: 2023-03-16 | End: 2023-06-14

## 2023-03-16 RX ORDER — GLIMEPIRIDE 4 MG/1
4 TABLET ORAL 2 TIMES DAILY
Qty: 120 TABLET | Refills: 3 | Status: SHIPPED | OUTPATIENT
Start: 2023-03-16

## 2023-03-16 NOTE — TELEPHONE ENCOUNTER
T/c from pt -- states the pharmacy is telling her they did not receive the scripts for her medications  Pt asking for medications to be [facility]       Please advise

## 2023-04-26 ENCOUNTER — OFFICE VISIT (OUTPATIENT)
Dept: GASTROENTEROLOGY | Facility: CLINIC | Age: 73
End: 2023-04-26

## 2023-04-26 ENCOUNTER — APPOINTMENT (OUTPATIENT)
Dept: LAB | Facility: HOSPITAL | Age: 73
End: 2023-04-26
Attending: INTERNAL MEDICINE

## 2023-04-26 VITALS
BODY MASS INDEX: 29.03 KG/M2 | DIASTOLIC BLOOD PRESSURE: 80 MMHG | OXYGEN SATURATION: 99 % | WEIGHT: 144 LBS | HEART RATE: 82 BPM | SYSTOLIC BLOOD PRESSURE: 124 MMHG | HEIGHT: 59 IN

## 2023-04-26 DIAGNOSIS — D69.6 THROMBOCYTOPENIA (HCC): ICD-10-CM

## 2023-04-26 DIAGNOSIS — D69.6 THROMBOCYTOPENIA (HCC): Primary | ICD-10-CM

## 2023-04-26 DIAGNOSIS — Z12.11 SCREENING FOR COLON CANCER: ICD-10-CM

## 2023-04-26 LAB
BASOPHILS # BLD AUTO: 0.05 THOUSANDS/ΜL (ref 0–0.1)
BASOPHILS NFR BLD AUTO: 1 % (ref 0–1)
EOSINOPHIL # BLD AUTO: 0.69 THOUSAND/ΜL (ref 0–0.61)
EOSINOPHIL NFR BLD AUTO: 12 % (ref 0–6)
ERYTHROCYTE [DISTWIDTH] IN BLOOD BY AUTOMATED COUNT: 16.1 % (ref 11.6–15.1)
HCT VFR BLD AUTO: 30.3 % (ref 34.8–46.1)
HGB BLD-MCNC: 9.4 G/DL (ref 11.5–15.4)
IMM GRANULOCYTES # BLD AUTO: 0.01 THOUSAND/UL (ref 0–0.2)
IMM GRANULOCYTES NFR BLD AUTO: 0 % (ref 0–2)
LYMPHOCYTES # BLD AUTO: 0.56 THOUSANDS/ΜL (ref 0.6–4.47)
LYMPHOCYTES NFR BLD AUTO: 10 % (ref 14–44)
MCH RBC QN AUTO: 24.2 PG (ref 26.8–34.3)
MCHC RBC AUTO-ENTMCNC: 31 G/DL (ref 31.4–37.4)
MCV RBC AUTO: 78 FL (ref 82–98)
MONOCYTES # BLD AUTO: 0.38 THOUSAND/ΜL (ref 0.17–1.22)
MONOCYTES NFR BLD AUTO: 7 % (ref 4–12)
NEUTROPHILS # BLD AUTO: 4.1 THOUSANDS/ΜL (ref 1.85–7.62)
NEUTS SEG NFR BLD AUTO: 70 % (ref 43–75)
NRBC BLD AUTO-RTO: 0 /100 WBCS
PLATELET # BLD AUTO: 156 THOUSANDS/UL (ref 149–390)
PMV BLD AUTO: 10.2 FL (ref 8.9–12.7)
RBC # BLD AUTO: 3.88 MILLION/UL (ref 3.81–5.12)
WBC # BLD AUTO: 5.79 THOUSAND/UL (ref 4.31–10.16)

## 2023-04-26 RX ORDER — LANCETS 33 GAUGE
EACH MISCELLANEOUS
COMMUNITY
Start: 2023-02-14

## 2023-04-26 NOTE — PROGRESS NOTES
[facility] - Outpatient Follow-up Note  Chandler Chaney 81 y o  female MRN: [id number]  Encounter: [id number]          ASSESSMENT AND PLAN:      1  Thrombocytopenia (HCC)  - CBC and differential; Future  -If her platelet count is normal then the patient can continue to plan on colonoscopy in early May as scheduled  2  Screening for colon cancer    3  Cirrhosis secondary to alcohol    Comment:   Today CBC shows a normal platelet count  The patient will proceed with colonoscopy as scheduled  ___________________________________________    SUBJECTIVE: This 81-year-old female comes the office today to prepare for a colonoscopy that scheduled in early May 2023  She had undergone a recent CBC performed on February 21, 2023 and it showed a hemoglobin of 9 1, WBC of 3 5, and platelet count of 10  Her previous CBC performed November 2, 2022 showed a hemoglobin of 10 7, WBC 5 1, and a platelet 296  She denies any problems with nausea, vomiting, heartburn, rectal bleeding, hematemesis, melena, diarrhea, constipation, bloating but she does admit to gaseousness  She states that she has an occasional left upper quadrant abdominal pain which lasts briefly  REVIEW OF SYSTEMS IS OTHERWISE NEGATIVE        Historical Information   Past Medical History:   Diagnosis Date   • Acute blood loss anemia 2/20/2021   • Acute embolism and thrombosis of other specified deep vein of right lower extremity ([facility] [address] ) 02/79/4293   • ALC (alcoholic liver cirrhosis) (HCC)    • Diabetes mellitus ([address] )    • Edema 2/23/2021   • HLD (hyperlipidemia)    • Hypertension    • Neuropathy    • Neuropathy 2/20/2021   • Pleural effusion 2/20/2021   • Seasonal allergies      Past Surgical History:   Procedure Laterality Date   • BREAST BIOPSY     • CHOLECYSTECTOMY     • NASAL SEPTUM SURGERY       Social History   Social History     Substance and Sexual Activity   Alcohol Use Yes    Comment: RARE      Social "History     Substance and Sexual Activity   Drug Use Yes   • Types: Marijuana    Comment:  unknown     Social History     Tobacco Use   Smoking Status Never   Smokeless Tobacco Never     Family History   Problem Relation Age of Onset   • Emphysema Mother    • Heart attack Father    • Heart disease Father        Meds/Allergies       Current Outpatient Medications:   •  Blood Glucose Monitoring Suppl ([employer] Blood Glucose Meter) w/Device KIT  •  CALCIUM PO  •  carvedilol (COREG) 3 125 mg tablet  •  Cholecalciferol (VITAMIN D3 PO)  •  Continuous Blood Gluc  (Dexcom G6 ) SOURAV  •  Continuous Blood Gluc Sensor (Dexcom G6 Sensor) MISC  •  Continuous Blood Gluc Transmit (Dexcom G6 Transmitter) MISC  •  ferrous sulfate 325 (65 Fe) mg tablet  •  furosemide (LASIX) 40 mg tablet  •  gabapentin (NEURONTIN) 300 mg capsule  •  glimepiride (AMARYL) 4 mg tablet  •  insulin degludec (TRESIBA) 100 units/mL injection pen  •  loratadine (CLARITIN) 10 mg tablet  •  metFORMIN (GLUCOPHAGE) 500 mg tablet  •  MULTIPLE VITAMIN PO  •  naloxone (NARCAN) 4 mg/0 1 mL nasal spray  •  pantoprazole (PROTONIX) 40 mg tablet  •  sodium chloride (EAMON 128) 5 % hypertonic ophthalmic solution  •  traMADol (ULTRAM) 50 mg tablet  •  diazepam (VALIUM) 10 mg tablet  •  Lancets (OneTouch Delica Plus [id number]) MISC    Allergies   Allergen Reactions   • Bee Pollen Other (See Comments)     Other reaction(s): Other (See Comments)   • Pollen Extract Other (See Comments)           Objective     Blood pressure 124/80, pulse 82, height 4' 10 5\" (1 486 m), weight 65 3 kg (144 lb), SpO2 99 %  Body mass index is 29 58 kg/m²        PHYSICAL EXAM:      General Appearance:   Alert, cooperative, no distress   HEENT:   Normocephalic, atraumatic, anicteric      Neck:  Supple, symmetrical, trachea midline   Lungs:   Clear to auscultation bilaterally; no rales, rhonchi or wheezing; respirations unlabored    Heart[de-identified]   Regular rate and rhythm; no murmur, rub, or " gallop  Abdomen:   Soft, non-tender, non-distended; normal bowel sounds; no masses, no organomegaly    Genitalia:   Deferred    Rectal:   Deferred    Extremities:  No cyanosis, clubbing or edema    Pulses:  2+ and symmetric    Skin:  No jaundice, rashes, or lesions    Lymph nodes:  No palpable cervical lymphadenopathy        Lab Results:   Appointment on 04/26/2023   Component Date Value   • WBC 04/26/2023 5 79    • RBC 04/26/2023 3 88    • Hemoglobin 04/26/2023 9 4 (L)    • Hematocrit 04/26/2023 30 3 (L)    • MCV 04/26/2023 78 (L)    • MCH 04/26/2023 24 2 (L)    • MCHC 04/26/2023 31 0 (L)    • RDW 04/26/2023 16 1 (H)    • MPV 04/26/2023 10 2    • Platelets 83/07/6056 156    • nRBC 04/26/2023 0    • Neutrophils Relative 04/26/2023 70    • Immat GRANS % 04/26/2023 0    • Lymphocytes Relative 04/26/2023 10 (L)    • Monocytes Relative 04/26/2023 7    • Eosinophils Relative 04/26/2023 12 (H)    • Basophils Relative 04/26/2023 1    • Neutrophils Absolute 04/26/2023 4 10    • Immature Grans Absolute 04/26/2023 0 01    • Lymphocytes Absolute 04/26/2023 0 56 (L)    • Monocytes Absolute 04/26/2023 0 38    • Eosinophils Absolute 04/26/2023 0 69 (H)    • Basophils Absolute 04/26/2023 0 05          Radiology Results:   No results found

## 2023-05-01 ENCOUNTER — NURSE TRIAGE (OUTPATIENT)
Dept: OTHER | Facility: OTHER | Age: 73
End: 2023-05-01

## 2023-05-01 NOTE — TELEPHONE ENCOUNTER
"Regarding: Prep questions  ----- Message from Muriel Fowler sent at 5/1/2023  1:52 PM EDT -----  \"I have a question about what medication and vitamins I can take prior to my colonoscopy  \"    "

## 2023-05-01 NOTE — TELEPHONE ENCOUNTER
Tried to call patient but she has a call screening service and it says the caller is not accepting calls from my number and then hangs up  Left message on mobile phone advising it is safe for her to take the miralax prep for the procedure as that is what was recommended by the physician

## 2023-05-01 NOTE — TELEPHONE ENCOUNTER
"Patient with questions regarding dulcolax/miralax prep  States last time she had to do prep for colonoscopy she was vomiting blood  Please call back regarding prep, and if it is safe for patient  Procedure on 5/3  Reason for Disposition   [1] Caller requesting NON-URGENT health information AND [2] PCP's office is the best resource    Answer Assessment - Initial Assessment Questions  1  REASON FOR CALL or QUESTION: \"What is your reason for calling today? \" or \"How can I best help you? \" or \"What question do you have that I can help answer? \"      Patient calling regarding prep    Protocols used: INFORMATION ONLY CALL - NO TRIAGE-ADULT-    "

## 2023-05-02 NOTE — TELEPHONE ENCOUNTER
Attempted to call patient  a recording stated it is being scanned by call blocker and disconnected  If patient calls back please let her know to call her family dr and let them know she is having a procedure and her sugars are not stable and they should best advise her

## 2023-05-02 NOTE — TELEPHONE ENCOUNTER
Patient is upset she has not spoken to anyone today and states she is ready to cancel her colonoscopy appt for 5/3/23 if nobody gets back to her soon  She reports her sugars are not stable and she needs to know what to do about her meds; she mentions that she previously had this procedure at [facility] and almost  and is rethinking having it again  Please call

## 2023-05-02 NOTE — TELEPHONE ENCOUNTER
Patient called that she has not received a call back from yesterday  She is concern about the prep due to her past experience  She wants to know if she can start it at noon it order to spread it out  Please give patient a call back at home phone number but she can still answer all the call regardless of the blocking service she has  Please call her back

## 2023-05-03 ENCOUNTER — ANESTHESIA (OUTPATIENT)
Dept: GASTROENTEROLOGY | Facility: HOSPITAL | Age: 73
End: 2023-05-03

## 2023-05-03 ENCOUNTER — HOSPITAL ENCOUNTER (OUTPATIENT)
Dept: GASTROENTEROLOGY | Facility: HOSPITAL | Age: 73
Setting detail: OUTPATIENT SURGERY
Discharge: HOME | End: 2023-05-03
Attending: INTERNAL MEDICINE

## 2023-05-03 ENCOUNTER — ANESTHESIA EVENT (OUTPATIENT)
Dept: GASTROENTEROLOGY | Facility: HOSPITAL | Age: 73
End: 2023-05-03

## 2023-05-03 VITALS
BODY MASS INDEX: 29.03 KG/M2 | HEART RATE: 67 BPM | TEMPERATURE: 98 F | DIASTOLIC BLOOD PRESSURE: 62 MMHG | OXYGEN SATURATION: 97 % | WEIGHT: 144 LBS | RESPIRATION RATE: 16 BRPM | HEIGHT: 59 IN | SYSTOLIC BLOOD PRESSURE: 129 MMHG

## 2023-05-03 DIAGNOSIS — Z86.010 HISTORY OF COLON POLYPS: ICD-10-CM

## 2023-05-03 LAB — GLUCOSE SERPL-MCNC: 202 MG/DL (ref 65–140)

## 2023-05-03 RX ORDER — PROPOFOL 10 MG/ML
INJECTION, EMULSION INTRAVENOUS AS NEEDED
Status: DISCONTINUED | OUTPATIENT
Start: 2023-05-03 | End: 2023-05-03

## 2023-05-03 RX ORDER — SODIUM CHLORIDE, SODIUM LACTATE, POTASSIUM CHLORIDE, CALCIUM CHLORIDE 600; 310; 30; 20 MG/100ML; MG/100ML; MG/100ML; MG/100ML
INJECTION, SOLUTION INTRAVENOUS CONTINUOUS PRN
Status: DISCONTINUED | OUTPATIENT
Start: 2023-05-03 | End: 2023-05-03

## 2023-05-03 RX ORDER — LIDOCAINE HYDROCHLORIDE 10 MG/ML
INJECTION, SOLUTION EPIDURAL; INFILTRATION; INTRACAUDAL; PERINEURAL AS NEEDED
Status: DISCONTINUED | OUTPATIENT
Start: 2023-05-03 | End: 2023-05-03

## 2023-05-03 RX ADMIN — PROPOFOL 40 MG: 10 INJECTION, EMULSION INTRAVENOUS at 12:54

## 2023-05-03 RX ADMIN — PROPOFOL 80 MG: 10 INJECTION, EMULSION INTRAVENOUS at 12:45

## 2023-05-03 RX ADMIN — LIDOCAINE HYDROCHLORIDE 50 MG: 10 INJECTION, SOLUTION EPIDURAL; INFILTRATION; INTRACAUDAL at 12:45

## 2023-05-03 RX ADMIN — PROPOFOL 40 MG: 10 INJECTION, EMULSION INTRAVENOUS at 12:51

## 2023-05-03 RX ADMIN — PROPOFOL 20 MG: 10 INJECTION, EMULSION INTRAVENOUS at 13:04

## 2023-05-03 RX ADMIN — PROPOFOL 40 MG: 10 INJECTION, EMULSION INTRAVENOUS at 12:48

## 2023-05-03 RX ADMIN — SODIUM CHLORIDE, SODIUM LACTATE, POTASSIUM CHLORIDE, AND CALCIUM CHLORIDE: .6; .31; .03; .02 INJECTION, SOLUTION INTRAVENOUS at 12:35

## 2023-05-03 RX ADMIN — PROPOFOL 20 MG: 10 INJECTION, EMULSION INTRAVENOUS at 13:00

## 2023-05-03 NOTE — ANESTHESIA POSTPROCEDURE EVALUATION
Post-Op Assessment Note    CV Status:  Stable    Pain management: adequate     Mental Status:  Sleepy and arousable   Hydration Status:  Euvolemic   PONV Controlled:  Controlled   Airway Patency:  Patent      Post Op Vitals Reviewed: Yes      Staff: CRNA, Anesthesiologist         No notable events documented      /58 (05/03/23 1310)    Temp 98 °F (36 7 °C) (05/03/23 1310)    Pulse 68 (05/03/23 1310)   Resp 16 (05/03/23 1310)    SpO2 97 % (05/03/23 1310)

## 2023-05-03 NOTE — H&P
"History and Physical -  [facility]  Raheem Chaney 81 y o  female MRN: [id number]      HPI: Brent Araya is a 81y o  year old female who presents for screening colonoscopy and a history of cirrhosis      REVIEW OF SYSTEMS: Per the HPI, and otherwise unremarkable  Historical Information   Past Medical History:   Diagnosis Date   • Acute blood loss anemia 02/20/2021   • Acute embolism and thrombosis of other specified deep vein of right lower extremity ([facility] [address] ) 15/74/7494   • ALC (alcoholic liver cirrhosis) (HCC)    • Colon polyp    • Diabetes mellitus ([facility] [address] )    • Edema 02/23/2021   • HLD (hyperlipidemia)    • Hypertension    • Neuropathy    • Neuropathy 02/20/2021   • Pleural effusion 02/20/2021   • Seasonal allergies      Past Surgical History:   Procedure Laterality Date   • BREAST BIOPSY     • CHOLECYSTECTOMY     • NASAL SEPTUM SURGERY       Social History   Social History     Substance and Sexual Activity   Alcohol Use Not Currently     Social History     Substance and Sexual Activity   Drug Use Yes   • Types: Marijuana    Comment:  unknown     Social History     Tobacco Use   Smoking Status Never   Smokeless Tobacco Never     Family History   Problem Relation Age of Onset   • Emphysema Mother    • Heart attack Father    • Heart disease Father        Meds/Allergies     (Not in a hospital admission)      Allergies   Allergen Reactions   • Bee Pollen Other (See Comments)     Other reaction(s): Other (See Comments)   • Pollen Extract Other (See Comments)       Objective     Blood pressure 166/76, pulse 78, temperature 97 6 °F (36 4 °C), temperature source Temporal, resp  rate 18, height 4' 10 5\" (1 486 m), weight 65 3 kg (144 lb), SpO2 98 %        PHYSICAL EXAM    Gen: NAD  CV: RRR  CHEST: Clear  ABD: soft, NT/ND  EXT: no edema      ASSESSMENT/PLAN:  This is a 81y o  year old female here for colonoscopy, and she is stable and optimized for her " procedure

## 2023-05-03 NOTE — ANESTHESIA PREPROCEDURE EVALUATION
Procedure:  COLONOSCOPY    1  Thrombocytopenia (HCC)  - CBC and differential; Future  -If her platelet count is normal then the patient can continue to plan on colonoscopy in early May as scheduled      2  Screening for colon cancer     3  Cirrhosis secondary to alcohol     Comment:   Today CBC shows a normal platelet count  The patient will proceed with colonoscopy as scheduled  ___________________________________________     SUBJECTIVE: This 61-year-old female comes the office today to prepare for a colonoscopy that scheduled in early May 2023  She had undergone a recent CBC performed on February 21, 2023 and it showed a hemoglobin of 9 1, WBC of 3 5, and platelet count of 10  Her previous CBC performed November 2, 2022 showed a hemoglobin of 10 7, WBC 5 1, and a platelet 185  She denies any problems with nausea, vomiting, heartburn, rectal bleeding, hematemesis, melena, diarrhea, constipation, bloating but she does admit to gaseousness    She states that she has an occasional left upper quadrant abdominal pain which lasts briefly      Relevant Problems   ENDO   (+) Diabetes mellitus type 2 in nonobese (HCC)      GI/HEPATIC   (+) Alcoholic cirrhosis of liver without ascites (HCC)      /RENAL   (+) Stage 3 chronic kidney disease, unspecified whether stage 3a or 3b CKD (HCC)      NEURO/PSYCH   (+) Continuous opioid dependence (HCC)   (+) Diabetic peripheral neuropathy (HCC)   (+) History of alcohol abuse        Physical Exam    Airway    Mallampati score: III  TM Distance: >3 FB  Neck ROM: full     Dental       Cardiovascular  Cardiovascular exam normal    Pulmonary  Pulmonary exam normal     Other Findings       History Comments History Comments   ALC (alcoholic liver cirrhosis) (HCC)  Diabetes mellitus (HCC)    Neuropathy  Hypertension    HLD (hyperlipidemia)  Seasonal allergies    Pleural effusion  Acute embolism and thrombosis of other specified deep vein of right lower extremity (HCC)    Neuropathy  Acute blood loss anemia    Edema        Obstetric History    No obstetric history on file  Surgical History     Current as of 05/03/23 1134  BREAST BIOPSY CHOLECYSTECTOMY   NASAL SEPTUM SURGERY      Substance History     Current as of 05/03/23 1134  Smoking Status: Never   Smokeless Tobacco Status: Never   Alcohol use: Yes, unspecified volume   Drug use: Marijuana       Anesthesia Plan  ASA Score- 3     Anesthesia Type- IV sedation with anesthesia with ASA Monitors  Additional Monitors:   Airway Plan:           Plan Factors-    Chart reviewed  Existing labs reviewed  Patient summary reviewed  Patient is a current smoker  Induction- intravenous  Postoperative Plan-     Informed Consent- Anesthetic plan and risks discussed with patient  I personally reviewed this patient with the CRNA  Discussed and agreed on the Anesthesia Plan with the CRNA  Shelley Tarango

## 2023-05-08 ENCOUNTER — TELEPHONE (OUTPATIENT)
Dept: GASTROENTEROLOGY | Facility: CLINIC | Age: 73
End: 2023-05-08

## 2023-05-08 NOTE — TELEPHONE ENCOUNTER
----- Message from Lulu Robles DO sent at 5/8/2023  6:51 AM EDT -----  Please call the patient with the polyp results  2 of the polyps were precancerous adenomas  The third polyp was a benign polyp    Patient due for repeat colonoscopy in 3 years

## 2023-05-08 NOTE — TELEPHONE ENCOUNTER
Home Number is being screened by [employer] and our office number is not being accepted      Called cell and spoke to

## 2023-05-22 ENCOUNTER — VBI (OUTPATIENT)
Dept: ADMINISTRATIVE | Facility: OTHER | Age: 73
End: 2023-05-22

## 2023-07-07 DIAGNOSIS — E11.9 DIABETES MELLITUS TYPE 2 IN NONOBESE (HCC): Primary | ICD-10-CM

## 2023-07-10 DIAGNOSIS — I10 PRIMARY HYPERTENSION: ICD-10-CM

## 2023-07-10 RX ORDER — CARVEDILOL 3.12 MG/1
TABLET ORAL
Qty: 180 TABLET | Refills: 3 | Status: SHIPPED | OUTPATIENT
Start: 2023-07-10

## 2023-07-12 ENCOUNTER — TELEPHONE (OUTPATIENT)
Dept: FAMILY MEDICINE CLINIC | Facility: CLINIC | Age: 73
End: 2023-07-12

## 2023-07-12 DIAGNOSIS — E11.9 DIABETES MELLITUS TYPE 2 IN NONOBESE (HCC): Primary | ICD-10-CM

## 2023-07-12 DIAGNOSIS — E11.9 DIABETES MELLITUS TYPE 2 IN NONOBESE (HCC): ICD-10-CM

## 2023-07-12 NOTE — TELEPHONE ENCOUNTER
T/c from pt -   Asking  to be referred to a different Endocrinologist  (pt prefers to stay in [facility] within [employer]), pt did not disclose reasons why not to see doctor Bella Francois MD, but haven't see / scheduled another appt with  endo yet. Pt reminded me to remind  not to give her a blank referral. Pt was very particular about her requests. Documented.      Please advise

## 2023-07-12 NOTE — TELEPHONE ENCOUNTER
Patient will only have enough medication until mid August but she is not scheduled for an appt with endo until the end of August. [facility] so that she will have it delivered in time before she runs out.

## 2023-07-13 NOTE — TELEPHONE ENCOUNTER
Attempted to contact pt to notify her but she did not answer. I left a message for pt requesting a call back.

## 2023-07-13 NOTE — TELEPHONE ENCOUNTER
Pt returned message - I spoke w pt - pt states that 's office called her yesterday. She scheduled an appt in about in 6 weeks to see her. Concerned of running low on meds. Notes she still keep seeing her but shes not happy and satisfied with the provider. Notes [facility] provider "almost killed her". Pt advised to  contact her insurance career for in network participating providers / do some Google research. Pt concerned for meds refills. Pt requesting a "blank referral" to a Endo for future. Pt will call with any questions / concerns. Pt expressed verbal understating and agrees.

## 2023-07-31 DIAGNOSIS — K70.30 ALCOHOLIC CIRRHOSIS OF LIVER WITHOUT ASCITES (HCC): ICD-10-CM

## 2023-07-31 RX ORDER — FUROSEMIDE 40 MG/1
40 TABLET ORAL DAILY
Qty: 90 TABLET | Refills: 3 | Status: SHIPPED | OUTPATIENT
Start: 2023-07-31

## 2023-08-07 ENCOUNTER — RA CDI HCC (OUTPATIENT)
Dept: OTHER | Facility: HOSPITAL | Age: 73
End: 2023-08-07

## 2023-08-07 NOTE — PROGRESS NOTES
E11.22, K70.30  [address] [facility] coding opportunities          Chart Reviewed number of suggestions sent to Provider: 2     Patients Insurance     Medicare Insurance: [employer]

## 2023-08-09 ENCOUNTER — APPOINTMENT (OUTPATIENT)
Dept: LAB | Facility: CLINIC | Age: 73
End: 2023-08-09
Payer: COMMERCIAL

## 2023-08-09 DIAGNOSIS — N18.31 CHRONIC KIDNEY DISEASE (CKD) STAGE G3A/A1, MODERATELY DECREASED GLOMERULAR FILTRATION RATE (GFR) BETWEEN 45-59 ML/MIN/1.73 SQUARE METER AND ALBUMINURIA CREATININE RATIO LESS THAN 30 MG/G (HCC): ICD-10-CM

## 2023-08-09 DIAGNOSIS — E11.9 DIABETES MELLITUS TYPE 2 IN NONOBESE (HCC): ICD-10-CM

## 2023-08-09 DIAGNOSIS — E04.2 MULTIPLE THYROID NODULES: ICD-10-CM

## 2023-08-09 LAB
25(OH)D3 SERPL-MCNC: 55.1 NG/ML (ref 30–100)
ALBUMIN SERPL BCP-MCNC: 3 G/DL (ref 3.5–5)
ALP SERPL-CCNC: 70 U/L (ref 46–116)
ALT SERPL W P-5'-P-CCNC: 18 U/L (ref 12–78)
ANION GAP SERPL CALCULATED.3IONS-SCNC: 4 MMOL/L
AST SERPL W P-5'-P-CCNC: 17 U/L (ref 5–45)
BASOPHILS # BLD AUTO: 0.06 THOUSANDS/ÂΜL (ref 0–0.1)
BASOPHILS NFR BLD AUTO: 1 % (ref 0–1)
BILIRUB SERPL-MCNC: 0.53 MG/DL (ref 0.2–1)
BUN SERPL-MCNC: 19 MG/DL (ref 5–25)
CALCIUM ALBUM COR SERPL-MCNC: 9.8 MG/DL (ref 8.3–10.1)
CALCIUM SERPL-MCNC: 9 MG/DL (ref 8.3–10.1)
CHLORIDE SERPL-SCNC: 103 MMOL/L (ref 96–108)
CO2 SERPL-SCNC: 30 MMOL/L (ref 21–32)
CREAT SERPL-MCNC: 1.33 MG/DL (ref 0.6–1.3)
EOSINOPHIL # BLD AUTO: 0.69 THOUSAND/ÂΜL (ref 0–0.61)
EOSINOPHIL NFR BLD AUTO: 10 % (ref 0–6)
ERYTHROCYTE [DISTWIDTH] IN BLOOD BY AUTOMATED COUNT: 16.1 % (ref 11.6–15.1)
GFR SERPL CREATININE-BSD FRML MDRD: 40 ML/MIN/1.73SQ M
GLUCOSE P FAST SERPL-MCNC: 275 MG/DL (ref 65–99)
HCT VFR BLD AUTO: 29.8 % (ref 34.8–46.1)
HGB BLD-MCNC: 9 G/DL (ref 11.5–15.4)
IMM GRANULOCYTES # BLD AUTO: 0.02 THOUSAND/UL (ref 0–0.2)
IMM GRANULOCYTES NFR BLD AUTO: 0 % (ref 0–2)
LYMPHOCYTES # BLD AUTO: 0.62 THOUSANDS/ÂΜL (ref 0.6–4.47)
LYMPHOCYTES NFR BLD AUTO: 9 % (ref 14–44)
MCH RBC QN AUTO: 23.1 PG (ref 26.8–34.3)
MCHC RBC AUTO-ENTMCNC: 30.2 G/DL (ref 31.4–37.4)
MCV RBC AUTO: 76 FL (ref 82–98)
MONOCYTES # BLD AUTO: 0.46 THOUSAND/ÂΜL (ref 0.17–1.22)
MONOCYTES NFR BLD AUTO: 6 % (ref 4–12)
NEUTROPHILS # BLD AUTO: 5.44 THOUSANDS/ÂΜL (ref 1.85–7.62)
NEUTS SEG NFR BLD AUTO: 74 % (ref 43–75)
NRBC BLD AUTO-RTO: 0 /100 WBCS
PHOSPHATE SERPL-MCNC: 3.4 MG/DL (ref 2.3–4.1)
PLATELET # BLD AUTO: 197 THOUSANDS/UL (ref 149–390)
PMV BLD AUTO: 11 FL (ref 8.9–12.7)
POTASSIUM SERPL-SCNC: 4.2 MMOL/L (ref 3.5–5.3)
PROT SERPL-MCNC: 7 G/DL (ref 6.4–8.4)
PTH-INTACT SERPL-MCNC: 74.7 PG/ML (ref 12–88)
RBC # BLD AUTO: 3.9 MILLION/UL (ref 3.81–5.12)
SODIUM SERPL-SCNC: 137 MMOL/L (ref 135–147)
TSH SERPL DL<=0.05 MIU/L-ACNC: 1.93 UIU/ML (ref 0.45–4.5)
WBC # BLD AUTO: 7.29 THOUSAND/UL (ref 4.31–10.16)

## 2023-08-09 PROCEDURE — 84443 ASSAY THYROID STIM HORMONE: CPT

## 2023-08-09 PROCEDURE — 83036 HEMOGLOBIN GLYCOSYLATED A1C: CPT

## 2023-08-09 PROCEDURE — 80053 COMPREHEN METABOLIC PANEL: CPT

## 2023-08-09 PROCEDURE — 84100 ASSAY OF PHOSPHORUS: CPT

## 2023-08-09 PROCEDURE — 36415 COLL VENOUS BLD VENIPUNCTURE: CPT

## 2023-08-09 PROCEDURE — 82306 VITAMIN D 25 HYDROXY: CPT

## 2023-08-09 PROCEDURE — 85025 COMPLETE CBC W/AUTO DIFF WBC: CPT

## 2023-08-09 PROCEDURE — 83970 ASSAY OF PARATHORMONE: CPT

## 2023-08-10 LAB
EST. AVERAGE GLUCOSE BLD GHB EST-MCNC: 286 MG/DL
HBA1C MFR BLD: 11.6 %

## 2023-08-14 ENCOUNTER — APPOINTMENT (OUTPATIENT)
Dept: LAB | Facility: CLINIC | Age: 73
End: 2023-08-14
Payer: COMMERCIAL

## 2023-08-14 DIAGNOSIS — E11.42 DIABETIC PERIPHERAL NEUROPATHY (HCC): ICD-10-CM

## 2023-08-14 DIAGNOSIS — F11.20 CONTINUOUS OPIOID DEPENDENCE (HCC): ICD-10-CM

## 2023-08-15 LAB
CREAT UR-MCNC: 15.1 MG/DL
MICROALBUMIN UR-MCNC: 11.6 MG/L (ref 0–20)
MICROALBUMIN/CREAT 24H UR: 77 MG/G CREATININE (ref 0–30)

## 2023-08-16 ENCOUNTER — OFFICE VISIT (OUTPATIENT)
Dept: FAMILY MEDICINE CLINIC | Facility: CLINIC | Age: 73
End: 2023-08-16
Payer: COMMERCIAL

## 2023-08-16 VITALS
HEART RATE: 91 BPM | WEIGHT: 148 LBS | OXYGEN SATURATION: 98 % | DIASTOLIC BLOOD PRESSURE: 72 MMHG | BODY MASS INDEX: 29.84 KG/M2 | HEIGHT: 59 IN | SYSTOLIC BLOOD PRESSURE: 124 MMHG | TEMPERATURE: 97.9 F

## 2023-08-16 DIAGNOSIS — R29.6 FALLING: ICD-10-CM

## 2023-08-16 DIAGNOSIS — E11.42 DIABETIC PERIPHERAL NEUROPATHY (HCC): ICD-10-CM

## 2023-08-16 DIAGNOSIS — D64.9 ANEMIA, UNSPECIFIED TYPE: Primary | ICD-10-CM

## 2023-08-16 DIAGNOSIS — Z12.31 ENCOUNTER FOR SCREENING MAMMOGRAM FOR MALIGNANT NEOPLASM OF BREAST: ICD-10-CM

## 2023-08-16 DIAGNOSIS — F11.20 CONTINUOUS OPIOID DEPENDENCE (HCC): ICD-10-CM

## 2023-08-16 DIAGNOSIS — E11.9 DIABETES MELLITUS TYPE 2 IN NONOBESE (HCC): ICD-10-CM

## 2023-08-16 PROCEDURE — 99214 OFFICE O/P EST MOD 30 MIN: CPT | Performed by: STUDENT IN AN ORGANIZED HEALTH CARE EDUCATION/TRAINING PROGRAM

## 2023-08-16 RX ORDER — GABAPENTIN 300 MG/1
600 CAPSULE ORAL 3 TIMES DAILY
Qty: 540 CAPSULE | Refills: 1 | Status: SHIPPED | OUTPATIENT
Start: 2023-08-16 | End: 2023-11-14

## 2023-08-16 RX ORDER — TRAMADOL HYDROCHLORIDE 50 MG/1
50 TABLET ORAL DAILY PRN
Qty: 10 TABLET | Refills: 0 | Status: SHIPPED | OUTPATIENT
Start: 2023-08-16

## 2023-08-16 NOTE — PROGRESS NOTES
Assessment/Plan:         Problem List Items Addressed This Visit        Endocrine    Diabetes mellitus type 2 in nonobese [facility])       Lab Results   Component Value Date    HGBA1C 11.6 (H) 08/09/2023     Add jardiance to metformin and glipizie. Has endo appointment in the next month. Gets eye exams at [facility] with Dr Ria Shepherd         Relevant Medications    Empagliflozin (Jardiance) 25 MG TABS    Diabetic peripheral neuropathy (HCC)    Relevant Medications    gabapentin (NEURONTIN) 300 mg capsule    traMADol (ULTRAM) 50 mg tablet    Empagliflozin (Jardiance) 25 MG TABS       Other    Continuous opioid dependence (HCC)     Minimize tramadol usage, 10 pills every several months          Relevant Medications    traMADol (ULTRAM) 50 mg tablet   Other Visit Diagnoses     Anemia, unspecified type    -  Primary    Relevant Orders    Iron Panel (Includes Ferritin, Iron Sat%, Iron, and TIBC)    Encounter for screening mammogram for malignant neoplasm of breast        Relevant Orders    Mammo screening bilateral w 3d & cad    Falling              Discussed different options out prevent falls including physical therapy minimize polypharmacy along with similar medications such as tramadol and gabapentin. Does not want to participate in PT at this time, uses tramadol very rarely and has found difficult to get off gabapentin in the past due to pain    Subjective:      Patient ID: Breezy Culver is a 72 y.o. female. HPI     Coming for follow-up and discuss labs. Has been following more the last couple months as well.   States she has been exercising as well has been following a very poor diet and notes her sugars have probably gone up    The following portions of the patient's history were reviewed and updated as appropriate:   Past Medical History:  She has a past medical history of Acute blood loss anemia (02/20/2021), Acute embolism and thrombosis of other specified deep vein of right lower extremity ([address]) (02/61/4661), ALC (alcoholic liver cirrhosis) ([address]), Colon polyp, Diabetes mellitus ([address]), Edema (02/23/2021), HLD (hyperlipidemia), Hypertension, Neuropathy, Neuropathy (02/20/2021), Pleural effusion (02/20/2021), and Seasonal allergies. ,  _______________________________________________________________________  Medical Problems:  does not have any pertinent problems on file.,  _______________________________________________________________________  Past Surgical History:   has a past surgical history that includes Breast biopsy; Cholecystectomy; and Nasal septum surgery. ,  _______________________________________________________________________  Family History:  family history includes Emphysema in her mother; Heart attack in her father; Heart disease in her father.,  _______________________________________________________________________  Social History:   reports that she has never smoked. She has never used smokeless tobacco. She reports that she does not currently use alcohol. She reports current drug use. Drug: Marijuana. ,  _______________________________________________________________________  Allergies:  is allergic to bee pollen and pollen extract. .  _______________________________________________________________________  Current Outpatient Medications   Medication Sig Dispense Refill   • Blood Glucose Monitoring Suppl ([employer] Blood Glucose Meter) w/Device KIT OneTouch Ultra. Use TID. E11.65     • CALCIUM PO Take by mouth     • carvedilol (COREG) 3.125 mg tablet TAKE 1 TABLET TWICE A DAY WITH MEALS 180 tablet 3   • Cholecalciferol (VITAMIN D3 PO) Take by mouth     • Continuous Blood Gluc  (Dexcom G6 ) SOURAV Use as directed. E11.65     • Continuous Blood Gluc Sensor (Dexcom G6 Sensor) MISC Use every 10 days. E11.65     • Continuous Blood Gluc Transmit (Dexcom G6 Transmitter) MISC Change every 3 months.  E11.65     • Empagliflozin (Jardiance) 25 MG TABS Take 1 tablet (25 mg total) by mouth daily 90 tablet 1   • ferrous sulfate 325 (65 Fe) mg tablet Take 1 tablet by mouth every other day     • furosemide (LASIX) 40 mg tablet TAKE 1 TABLET DAILY 90 tablet 3   • gabapentin (NEURONTIN) 300 mg capsule Take 2 capsules (600 mg total) by mouth 3 (three) times a day 540 capsule 1   • glimepiride (AMARYL) 4 mg tablet Take 1 tablet (4 mg total) by mouth 2 (two) times a day 120 tablet 3   • insulin degludec (TRESIBA) 100 units/mL injection pen Inject 16 Units under the skin daily 16 units at bedtime 15 mL 3   • Lancets (OneTouch Delica Plus [id number]) MISC      • loratadine (CLARITIN) 10 mg tablet Take 1 tablet (10 mg total) by mouth daily 90 tablet 1   • metFORMIN (GLUCOPHAGE) 500 mg tablet Take 1 tablet (500 mg total) by mouth 3 (three) times a day 270 tablet 2   • MULTIPLE VITAMIN PO Take 1 tablet by mouth     • naloxone (NARCAN) 4 mg/0.1 mL nasal spray Administer 1 spray into a nostril. If no response after 2-3 minutes, give another dose in the other nostril using a new spray. 1 each 1   • pantoprazole (PROTONIX) 40 mg tablet Take 1 tablet (40 mg total) by mouth daily 90 tablet 1   • sodium chloride (EAMON 128) 5 % hypertonic ophthalmic solution Administer 1 drop to both eyes every evening     • traMADol (ULTRAM) 50 mg tablet Take 1 tablet (50 mg total) by mouth daily as needed for severe pain 10 tablet 0     No current facility-administered medications for this visit.     _______________________________________________________________________  Review of Systems   Constitutional: Negative for chills, fatigue and fever. HENT: Negative for rhinorrhea and sore throat. Eyes: Negative for visual disturbance. Respiratory: Negative for cough and shortness of breath. Cardiovascular: Negative for chest pain and palpitations. Gastrointestinal: Negative for abdominal pain, constipation, diarrhea, nausea and vomiting. Genitourinary: Negative for difficulty urinating, dysuria and frequency.    Musculoskeletal: Negative for arthralgias and myalgias. Skin: Negative for color change and rash. Neurological: Negative for weakness and headaches. Objective:  Vitals:    08/16/23 1308   BP: 124/72   BP Location: Left arm   Patient Position: Sitting   Cuff Size: Standard   Pulse: 91   Temp: 97.9 °F (36.6 °C)   SpO2: 98%   Weight: 67.1 kg (148 lb)   Height: 4' 10.5" (1.486 m)     Body mass index is 30.41 kg/m². Physical Exam  Constitutional:       General: She is not in acute distress. Appearance: She is not ill-appearing. HENT:      Head: Normocephalic and atraumatic. Right Ear: External ear normal.      Left Ear: External ear normal.      Nose: Nose normal. No congestion or rhinorrhea. Mouth/Throat:      Mouth: Mucous membranes are moist.      Pharynx: Oropharynx is clear. No oropharyngeal exudate or posterior oropharyngeal erythema. Eyes:      Extraocular Movements: Extraocular movements intact. Conjunctiva/sclera: Conjunctivae normal.      Pupils: Pupils are equal, round, and reactive to light. Cardiovascular:      Rate and Rhythm: Normal rate and regular rhythm. Pulses: Normal pulses. Heart sounds: No murmur heard. Pulmonary:      Effort: Pulmonary effort is normal. No respiratory distress. Breath sounds: Normal breath sounds. No wheezing. Chest:      Chest wall: No tenderness. Abdominal:      General: Bowel sounds are normal.      Palpations: Abdomen is soft. Tenderness: There is no abdominal tenderness. Musculoskeletal:         General: Normal range of motion. Cervical back: Normal range of motion. Skin:     General: Skin is warm and dry. Capillary Refill: Capillary refill takes less than 2 seconds. Findings: No rash. Neurological:      General: No focal deficit present. Mental Status: She is alert. Mental status is at baseline.

## 2023-08-16 NOTE — ASSESSMENT & PLAN NOTE
Lab Results   Component Value Date    HGBA1C 11.6 (H) 08/09/2023     Add jardiance to metformin and glipizie. Has endo appointment in the next month.     Gets eye exams at [facility] with Dr Marty Lainez

## 2023-08-18 DIAGNOSIS — J30.2 SEASONAL ALLERGIES: ICD-10-CM

## 2023-08-18 RX ORDER — TRAMADOL HYDROCHLORIDE 50 MG/1
50 TABLET ORAL DAILY PRN
Qty: 7 TABLET | Refills: 1 | OUTPATIENT
Start: 2023-08-18

## 2023-08-18 RX ORDER — LORATADINE 10 MG/1
TABLET ORAL
Qty: 90 TABLET | Refills: 1 | Status: SHIPPED | OUTPATIENT
Start: 2023-08-18

## 2023-08-30 ENCOUNTER — TELEPHONE (OUTPATIENT)
Dept: NEPHROLOGY | Facility: CLINIC | Age: 73
End: 2023-08-30

## 2023-08-30 ENCOUNTER — OFFICE VISIT (OUTPATIENT)
Age: 73
End: 2023-08-30
Payer: COMMERCIAL

## 2023-08-30 ENCOUNTER — TELEPHONE (OUTPATIENT)
Dept: ADMINISTRATIVE | Facility: OTHER | Age: 73
End: 2023-08-30

## 2023-08-30 VITALS
TEMPERATURE: 97.2 F | RESPIRATION RATE: 22 BRPM | BODY MASS INDEX: 31.28 KG/M2 | OXYGEN SATURATION: 99 % | WEIGHT: 149 LBS | HEART RATE: 90 BPM | HEIGHT: 58 IN | SYSTOLIC BLOOD PRESSURE: 126 MMHG | DIASTOLIC BLOOD PRESSURE: 70 MMHG

## 2023-08-30 DIAGNOSIS — E11.9 DIABETES MELLITUS TYPE 2 IN NONOBESE (HCC): ICD-10-CM

## 2023-08-30 PROCEDURE — 99214 OFFICE O/P EST MOD 30 MIN: CPT | Performed by: INTERNAL MEDICINE

## 2023-08-30 NOTE — TELEPHONE ENCOUNTER
I called and left message on patients answering machine for patient to give the office a call back about scheduling a Nephrology Consult ref by Dr. Amish Tee for Diabetes mellitus type 2 in nonobese [facility])

## 2023-08-30 NOTE — LETTER
Diabetic Foot Exam Form    Date Requested: 23  Patient: Nain Chaney  Patient : 1950   Referring Provider: Claire Jalloh MD    Diabetic Foot Exam Performed with shoes and socks removed        Yes         No     Date of Diabetic Foot Exam ______________________________  Risk Score ____________________________________________    Left Foot       Visual Inspection         Monofilament Testing Sensory Exam        Pedal Pulses         Additional Comments         Right Foot      Visual Inspection         Monofilament Testing Sensory Exam       Pedal Pulses         Additional Comments         Comments __________________________________________________________    Practice Providing Exam ______________________________________________    Exam Performed By (print name) _______________________________________      Provider Signature ___________________________________________________      These reports are needed for  compliance. Please fax this completed form and a copy of the Diabetic Foot Exam report to our office located at [address] [place] [address] as soon as possible via Fax [phone] rubén Rodriguez: Phone [phone]    We thank you for your assistance in treating our mutual patient.

## 2023-08-30 NOTE — PROGRESS NOTES
[facility] 72 [facility] MR[facility]: [id number]    Encounter: [id number]      Assessment/Plan     Assessment: This is a 72y.o.-year-old female follow up on    1-T2DM: Her A1c has jumped to 11.6% since last visit! She ascribes this to her very bad diet. She does not use and want her CGM. She showed me last two days BS log and it is 160-180 ( morning and mid day). She seems happy and feels well. Her GFR is stable around 40, she used to see a nephrologist but was dismissed. In her file I can see one time PTH level of 148 with normal corrected serum calcium level. Her last PTH is 76 with 25OHD level of more than 50. As was planned before she has just started taking jardiance in past 2 weeks , tolerating it and she believes it had a good impact on her BS log so   See plan. 2-alcoholic cirrhosis+ eosophageal varices: per her GI doctor    3-CKD IV : needs nephro consult    Plan:  Increase bed time insulin to 20 U   Check BS x2/d ( in clusters) in next two weeks and send us the log  Will add short acting insulin before meals ( preferably her dinner which is the biggest meal of the day)  Advised on some level of dietary discretion  Referred to a nephrologist  RTV in 4 mn with A1c, lipid panel    CC: Diabetes    History of Present Illness     HPI:  T2DM, CKD IV, cirrhosis, colon pre cancerous polyp     Review of Systems   Constitutional: Negative for appetite change, fatigue and unexpected weight change. HENT: Negative for trouble swallowing. Eyes: Negative for visual disturbance. Respiratory: Negative for cough, chest tightness and shortness of breath. Cardiovascular: Negative for chest pain, palpitations and leg swelling. Gastrointestinal: Negative for blood in stool, constipation, diarrhea, nausea and vomiting. Endocrine: Negative for polyuria. Genitourinary: Negative for dysuria, frequency, genital sores and vaginal discharge. Musculoskeletal: Positive for arthralgias.    Skin: Negative for rash and wound. Neurological: Negative for weakness, numbness and headaches. Hematological: Does not bruise/bleed easily. Psychiatric/Behavioral: Negative for confusion. Historical Information   Past Medical History:   Diagnosis Date   • Acute blood loss anemia 02/20/2021   • Acute embolism and thrombosis of other specified deep vein of right lower extremity ([address]) 19/43/1916   • ALC (alcoholic liver cirrhosis) (HCC)    • Colon polyp    • Diabetes mellitus ([address])    • Edema 02/23/2021   • HLD (hyperlipidemia)    • Hypertension    • Neuropathy    • Neuropathy 02/20/2021   • Pleural effusion 02/20/2021   • Seasonal allergies      Past Surgical History:   Procedure Laterality Date   • BREAST BIOPSY     • CHOLECYSTECTOMY     • NASAL SEPTUM SURGERY       Social History   Social History     Substance and Sexual Activity   Alcohol Use Not Currently     Social History     Substance and Sexual Activity   Drug Use Yes   • Types: Marijuana    Comment:  unknown     Social History     Tobacco Use   Smoking Status Never   Smokeless Tobacco Never     Family History:   Family History   Problem Relation Age of Onset   • Emphysema Mother    • Heart attack Father    • Heart disease Father        Meds/Allergies   Current Outpatient Medications   Medication Sig Dispense Refill   • Blood Glucose Monitoring Suppl ([employer]S Blood Glucose Meter) w/Device KIT OneTouch Ultra. Use TID. E11.65     • CALCIUM PO Take by mouth     • carvedilol (COREG) 3.125 mg tablet TAKE 1 TABLET TWICE A DAY WITH MEALS 180 tablet 3   • Continuous Blood Gluc  (Dexcom G6 ) SOURAV Use as directed. E11.65     • Continuous Blood Gluc Sensor (Dexcom G6 Sensor) MISC Use every 10 days. E11.65     • Continuous Blood Gluc Transmit (Dexcom G6 Transmitter) MISC Change every 3 months.  E11.65     • Empagliflozin (Jardiance) 25 MG TABS Take 1 tablet (25 mg total) by mouth daily 90 tablet 1   • ferrous sulfate 325 (65 Fe) mg tablet Take 1 tablet by mouth every other day     • furosemide (LASIX) 40 mg tablet TAKE 1 TABLET DAILY 90 tablet 3   • gabapentin (NEURONTIN) 300 mg capsule Take 2 capsules (600 mg total) by mouth 3 (three) times a day 540 capsule 1   • glimepiride (AMARYL) 4 mg tablet Take 1 tablet (4 mg total) by mouth 2 (two) times a day 120 tablet 3   • insulin degludec (TRESIBA) 100 units/mL injection pen Inject 16 Units under the skin daily 16 units at bedtime 15 mL 3   • Lancets (OneTouch Delica Plus [id number]) MISC      • loratadine (CLARITIN) 10 mg tablet TAKE 1 TABLET BY MOUTH EVERY DAY *NOT COV BY INS* 90 tablet 1   • metFORMIN (GLUCOPHAGE) 500 mg tablet Take 1 tablet (500 mg total) by mouth 3 (three) times a day 270 tablet 2   • MULTIPLE VITAMIN PO Take 1 tablet by mouth     • naloxone (NARCAN) 4 mg/0.1 mL nasal spray Administer 1 spray into a nostril. If no response after 2-3 minutes, give another dose in the other nostril using a new spray. 1 each 1   • pantoprazole (PROTONIX) 40 mg tablet Take 1 tablet (40 mg total) by mouth daily 90 tablet 1   • sodium chloride (EAMON 128) 5 % hypertonic ophthalmic solution Administer 1 drop to both eyes every evening     • traMADol (ULTRAM) 50 mg tablet Take 1 tablet (50 mg total) by mouth daily as needed for severe pain 10 tablet 0   • Cholecalciferol (VITAMIN D3 PO) Take by mouth (Patient not taking: Reported on 8/30/2023)       No current facility-administered medications for this visit. Allergies   Allergen Reactions   • Bee Pollen Other (See Comments)     Other reaction(s): Other (See Comments)   • Pollen Extract Other (See Comments)       Objective   Vitals: Blood pressure 126/70, pulse 90, temperature (!) 97.2 °F (36.2 °C), temperature source Tympanic, resp. rate 22, height 4' 10.25" (1.48 m), weight 67.6 kg (149 lb), SpO2 99 %. Physical Exam  Vitals reviewed. Constitutional:       Appearance: Normal appearance. She is not ill-appearing. HENT:      Head: Normocephalic.    Eyes: Extraocular Movements: Extraocular movements intact. Neck:      Thyroid: No thyromegaly. Vascular: No carotid bruit. Cardiovascular:      Rate and Rhythm: Normal rate and regular rhythm. Pulses: Normal pulses. Heart sounds: Normal heart sounds. No murmur heard. Pulmonary:      Breath sounds: Normal breath sounds. No wheezing. Abdominal:      Palpations: Abdomen is soft. Tenderness: There is no abdominal tenderness. Musculoskeletal:      Right lower leg: No edema. Left lower leg: No edema. Lymphadenopathy:      Cervical: No cervical adenopathy. Skin:     General: Skin is warm. Findings: No rash. Neurological:      General: No focal deficit present. Mental Status: She is oriented to person, place, and time. Cranial Nerves: No cranial nerve deficit. Sensory: No sensory deficit. Psychiatric:         Mood and Affect: Mood normal.         The history was obtained from the review of the chart, patient. Lab Results:   Lab Results   Component Value Date/Time    Hemoglobin A1C 11.6 (H) 08/09/2023 11:27 AM    Hemoglobin A1C 7.5 (H) 01/03/2023 11:55 AM    Hemoglobin A1C 6.3 11/16/2022 02:06 PM    WBC 7.29 08/09/2023 11:27 AM    WBC 5.79 04/26/2023 12:40 PM    Hemoglobin 9.0 (L) 08/09/2023 11:27 AM    Hemoglobin 9.4 (L) 04/26/2023 12:40 PM    Hematocrit 29.8 (L) 08/09/2023 11:27 AM    Hematocrit 30.3 (L) 04/26/2023 12:40 PM    MCV 76 (L) 08/09/2023 11:27 AM    MCV 78 (L) 04/26/2023 12:40 PM    Platelets 188 73/42/3424 11:27 AM    Platelets 764 54/96/9972 12:40 PM    BUN 19 08/09/2023 11:27 AM    Potassium 4.2 08/09/2023 11:27 AM    Chloride 103 08/09/2023 11:27 AM    CO2 30 08/09/2023 11:27 AM    Creatinine 1.33 (H) 08/09/2023 11:27 AM    AST 17 08/09/2023 11:27 AM    ALT 18 08/09/2023 11:27 AM    Total Protein 7.0 08/09/2023 11:27 AM    Albumin 3.0 (L) 08/09/2023 11:27 AM           Imaging Studies: I have personally reviewed pertinent reports.       Portions of the record may have been created with voice recognition software. Occasional wrong word or "sound a like" substitutions may have occurred due to the inherent limitations of voice recognition software. Read the chart carefully and recognize, using context, where substitutions have occurred.

## 2023-08-30 NOTE — TELEPHONE ENCOUNTER
----- Message from [facility] sent at 8/30/2023  8:42 AM EDT -----  Regarding: Care Gap Request [facility]  08/30/23 8:42 AM    Hello, our patient Author Mc has had Diabetic Eye Exam completed/performed. Please assist in updating the patient chart by pulling a previous Electronic Medical Record (EMR) document.  The previous EMR is  dr sal     Thank you,  [facility]  [address]

## 2023-08-30 NOTE — TELEPHONE ENCOUNTER
----- Message from [facility] sent at 8/30/2023  8:43 AM EDT -----  Regarding: Care Gap Request [facility]  08/30/23 8:43 AM    Hello, our patient Shandra Cuello has had Diabetic Foot Exam completed/performed. Please assist in updating the patient chart by pulling a previous Electronic Medical Record (EMR) document.   Doctor Aureliano Preciado  last foot exam     Thank you,  [facility]  [phone]

## 2023-08-30 NOTE — LETTER
Diabetic Foot Exam Form    Date Requested: 23  Patient: Joy Chaney  Patient : 1950   Referring Provider: Lola Bowden MD    Diabetic Foot Exam Performed with shoes and socks removed        Yes         No     Date of Diabetic Foot Exam ______________________________  Risk Score ____________________________________________    Left Foot       Visual Inspection         Monofilament Testing Sensory Exam        Pedal Pulses         Additional Comments         Right Foot      Visual Inspection         Monofilament Testing Sensory Exam       Pedal Pulses         Additional Comments         Comments __________________________________________________________    Practice Providing Exam ______________________________________________    Exam Performed By (print name) _______________________________________      Provider Signature ___________________________________________________      These reports are needed for  compliance. Please fax this completed form and a copy of the Diabetic Foot Exam report to our office located at [address] [place] [address] as soon as possible via Fax [phone] rubén Higuera: Phone [phone]    We thank you for your assistance in treating our mutual patient.

## 2023-09-01 NOTE — TELEPHONE ENCOUNTER
Upon review of the In Basket request and the patient's chart, initial outreach has been made via telephone call to facility. Please see Contacts section for details.  eye report     Thank you  Ana Maria Neal MA

## 2023-09-01 NOTE — TELEPHONE ENCOUNTER
Upon review of the In Basket request and the patient's chart, initial outreach has been made via fax to facility. Please see Contacts section for details.  foot report     Thank you  Vanita Rosado MA

## 2023-09-05 NOTE — TELEPHONE ENCOUNTER
Upon review of the In Basket request we were able to locate, review, and update the patient chart as requested for Diabetic Eye Exam.    Any additional questions or concerns should be emailed to the Practice Liaisons via the appropriate education email address, please do not reply via In Basket.     Thank you  Ronn Lamar MA

## 2023-09-07 NOTE — TELEPHONE ENCOUNTER
As a follow-up, a second attempt has been made for outreach via fax to facility. Please see Contacts section for details.     Thank you  Emelia Macario MA

## 2023-09-15 NOTE — TELEPHONE ENCOUNTER
As a final attempt, a third outreach has been made via telephone call to facility. Please see Contacts section for details. This encounter will be closed and completed by end of day. Should we receive the requested information because of previous outreach attempts, the requested patient's chart will be updated appropriately.      Thank you  Niurka Sevilla MA

## 2023-09-22 DIAGNOSIS — E11.9 DIABETES MELLITUS TYPE 2 IN NONOBESE (HCC): Primary | ICD-10-CM

## 2023-09-25 RX ORDER — LANCETS 33 GAUGE
EACH MISCELLANEOUS 3 TIMES DAILY
Qty: 300 EACH | Refills: 2 | Status: SHIPPED | OUTPATIENT
Start: 2023-09-25

## 2023-10-05 NOTE — PATIENT INSTRUCTIONS
Increase bed time insulin to 20 units  In next two weeks check your blood sugar before breakfast and dinner alternate with before lunch and bed time and let us have the rsult
3 (mild pain)

## 2023-10-30 DIAGNOSIS — E11.9 DIABETES MELLITUS TYPE 2 IN NONOBESE (HCC): ICD-10-CM

## 2023-10-30 RX ORDER — GLIMEPIRIDE 4 MG/1
4 TABLET ORAL 2 TIMES DAILY
Qty: 120 TABLET | Refills: 5 | Status: SHIPPED | OUTPATIENT
Start: 2023-10-30

## 2023-11-02 ENCOUNTER — TELEPHONE (OUTPATIENT)
Dept: FAMILY MEDICINE CLINIC | Facility: CLINIC | Age: 73
End: 2023-11-02

## 2023-11-02 NOTE — TELEPHONE ENCOUNTER
Maureen Honeycutt called from [facility] regards glimepiride (AMARYL) 4 mg tablet - The Copay's are cheaper as a 90 day supply - pt would also like 90 day supplies - sent in 120 (180 req) -  sent script on 10/30 - okayed verbally, script with 2 additional refills - Spoke w Bryce Eli @[phone]

## 2023-11-08 DIAGNOSIS — N18.30 STAGE 3 CHRONIC KIDNEY DISEASE, UNSPECIFIED WHETHER STAGE 3A OR 3B CKD (HCC): Primary | ICD-10-CM

## 2023-11-13 DIAGNOSIS — K70.30 ALCOHOLIC CIRRHOSIS OF LIVER WITHOUT ASCITES (HCC): ICD-10-CM

## 2023-11-13 DIAGNOSIS — F10.11 HISTORY OF ALCOHOL ABUSE: ICD-10-CM

## 2023-11-13 RX ORDER — PANTOPRAZOLE SODIUM 40 MG/1
40 TABLET, DELAYED RELEASE ORAL DAILY
Qty: 90 TABLET | Refills: 1 | Status: SHIPPED | OUTPATIENT
Start: 2023-11-13

## 2023-11-16 ENCOUNTER — RA CDI HCC (OUTPATIENT)
Dept: OTHER | Facility: HOSPITAL | Age: 73
End: 2023-11-16

## 2023-11-16 NOTE — PROGRESS NOTES
[facility] coding opportunities          Chart Reviewed number of suggestions sent to Provider: 4   E11.65  E11.29, R80.9  E11.22  K70.30    Patients Insurance     Medicare Insurance: [facility]

## 2023-11-22 ENCOUNTER — OFFICE VISIT (OUTPATIENT)
Dept: FAMILY MEDICINE CLINIC | Facility: CLINIC | Age: 73
End: 2023-11-22
Payer: COMMERCIAL

## 2023-11-22 VITALS
BODY MASS INDEX: 29.23 KG/M2 | SYSTOLIC BLOOD PRESSURE: 126 MMHG | DIASTOLIC BLOOD PRESSURE: 70 MMHG | HEART RATE: 88 BPM | WEIGHT: 145 LBS | OXYGEN SATURATION: 98 % | TEMPERATURE: 99 F | HEIGHT: 59 IN

## 2023-11-22 DIAGNOSIS — Z00.00 ENCOUNTER FOR ANNUAL WELLNESS VISIT (AWV) IN MEDICARE PATIENT: ICD-10-CM

## 2023-11-22 DIAGNOSIS — I85.11 SECONDARY ESOPHAGEAL VARICES WITH BLEEDING (HCC): ICD-10-CM

## 2023-11-22 DIAGNOSIS — K70.30 ALCOHOLIC CIRRHOSIS OF LIVER WITHOUT ASCITES (HCC): ICD-10-CM

## 2023-11-22 DIAGNOSIS — E11.42 DIABETIC PERIPHERAL NEUROPATHY (HCC): ICD-10-CM

## 2023-11-22 DIAGNOSIS — S46.811S STRAIN OF RIGHT TRAPEZIUS MUSCLE, SEQUELA: ICD-10-CM

## 2023-11-22 DIAGNOSIS — F11.20 CONTINUOUS OPIOID DEPENDENCE (HCC): ICD-10-CM

## 2023-11-22 DIAGNOSIS — E11.9 DIABETES MELLITUS TYPE 2 IN NONOBESE (HCC): Primary | ICD-10-CM

## 2023-11-22 PROBLEM — F10.11 HISTORY OF ALCOHOL ABUSE: Status: RESOLVED | Noted: 2021-02-20 | Resolved: 2023-11-22

## 2023-11-22 LAB — SL AMB POCT HEMOGLOBIN AIC: 9.3 (ref ?–6.5)

## 2023-11-22 PROCEDURE — 83036 HEMOGLOBIN GLYCOSYLATED A1C: CPT | Performed by: STUDENT IN AN ORGANIZED HEALTH CARE EDUCATION/TRAINING PROGRAM

## 2023-11-22 PROCEDURE — G0439 PPPS, SUBSEQ VISIT: HCPCS | Performed by: STUDENT IN AN ORGANIZED HEALTH CARE EDUCATION/TRAINING PROGRAM

## 2023-11-22 PROCEDURE — 99214 OFFICE O/P EST MOD 30 MIN: CPT | Performed by: STUDENT IN AN ORGANIZED HEALTH CARE EDUCATION/TRAINING PROGRAM

## 2023-11-22 RX ORDER — TRAMADOL HYDROCHLORIDE 50 MG/1
50 TABLET ORAL DAILY PRN
Qty: 10 TABLET | Refills: 0 | Status: SHIPPED | OUTPATIENT
Start: 2023-11-22

## 2023-11-22 RX ORDER — TRAMADOL HYDROCHLORIDE 50 MG/1
50 TABLET ORAL DAILY PRN
Qty: 10 TABLET | Refills: 0 | OUTPATIENT
Start: 2023-11-22

## 2023-11-22 NOTE — PATIENT INSTRUCTIONS
Medicare Preventive Visit Patient Instructions  Thank you for completing your Welcome to Medicare Visit or Medicare Annual Wellness Visit today. Your next wellness visit will be due in one year (11/22/2024). The screening/preventive services that you may require over the next 5-10 years are detailed below. Some tests may not apply to you based off risk factors and/or age. Screening tests ordered at today's visit but not completed yet may show as past due. Also, please note that scanned in results may not display below. Preventive Screenings:  Service Recommendations Previous Testing/Comments   Colorectal Cancer Screening  * Colonoscopy    * Fecal Occult Blood Test (FOBT)/Fecal Immunochemical Test (FIT)  * Fecal DNA/Cologuard Test  * Flexible Sigmoidoscopy Age: 43-73 years old   Colonoscopy: every 10 years (may be performed more frequently if at higher risk)  OR  FOBT/FIT: every 1 year  OR  Cologuard: every 3 years  OR  Sigmoidoscopy: every 5 years  Screening may be recommended earlier than age 39 if at higher risk for colorectal cancer. Also, an individualized decision between you and your healthcare provider will decide whether screening between the ages of 77-80 would be appropriate. Colonoscopy: 05/03/2023  FOBT/FIT: Not on file  Cologuard: Not on file  Sigmoidoscopy: Not on file    Screening Current     Breast Cancer Screening Age: 36 years old  Frequency: every 1-2 years  Not required if history of left and right mastectomy Mammogram: 11/27/2019        Cervical Cancer Screening Between the ages of 21-29, pap smear recommended once every 3 years. Between the ages of 32-69, can perform pap smear with HPV co-testing every 5 years.    Recommendations may differ for women with a history of total hysterectomy, cervical cancer, or abnormal pap smears in past. Pap Smear: Not on file    Screening Not Indicated   Hepatitis C Screening Once for adults born between 1945 and 1965  More frequently in patients at high risk for Hepatitis C Hep C Antibody: Not on file        Diabetes Screening 1-2 times per year if you're at risk for diabetes or have pre-diabetes Fasting glucose: 275 mg/dL (8/9/2023)  A1C: 11.6 % (8/9/2023)  Screening Not Indicated  History Diabetes   Cholesterol Screening Once every 5 years if you don't have a lipid disorder. May order more often based on risk factors. Lipid panel: 02/16/2022    Screening Current     Other Preventive Screenings Covered by Medicare:  Abdominal Aortic Aneurysm (AAA) Screening: covered once if your at risk. You're considered to be at risk if you have a family history of AAA. Lung Cancer Screening: covers low dose CT scan once per year if you meet all of the following conditions: (1) Age 48-67; (2) No signs or symptoms of lung cancer; (3) Current smoker or have quit smoking within the last 15 years; (4) You have a tobacco smoking history of at least 20 pack years (packs per day multiplied by number of years you smoked); (5) You get a written order from a healthcare provider. Glaucoma Screening: covered annually if you're considered high risk: (1) You have diabetes OR (2) Family history of glaucoma OR (3)  aged 48 and older OR (3)  American aged 72 and older  Osteoporosis Screening: covered every 2 years if you meet one of the following conditions: (1) You're estrogen deficient and at risk for osteoporosis based off medical history and other findings; (2) Have a vertebral abnormality; (3) On glucocorticoid therapy for more than 3 months; (4) Have primary hyperparathyroidism; (5) On osteoporosis medications and need to assess response to drug therapy. Last bone density test (DXA Scan): 03/01/2023. HIV Screening: covered annually if you're between the age of 14-79. Also covered annually if you are younger than 13 and older than 72 with risk factors for HIV infection.  For pregnant patients, it is covered up to 3 times per pregnancy. Immunizations:  Immunization Recommendations   Influenza Vaccine Annual influenza vaccination during flu season is recommended for all persons aged >= 6 months who do not have contraindications   Pneumococcal Vaccine   * Pneumococcal conjugate vaccine = PCV13 (Prevnar 13), PCV15 (Vaxneuvance), PCV20 (Prevnar 20)  * Pneumococcal polysaccharide vaccine = PPSV23 (Pneumovax) Adults 87-10 yo with certain risk factors or if 69+ yo  If never received any pneumonia vaccine: recommend Prevnar 20 (PCV20)  Give PCV20 if previously received 1 dose of PCV13 or PPSV23   Hepatitis B Vaccine 3 dose series if at intermediate or high risk (ex: diabetes, end stage renal disease, liver disease)   Respiratory syncytial virus (RSV) Vaccine - COVERED BY MEDICARE PART D  * RSVPreF3 (Arexvy) CDC recommends that adults 61years of age and older may receive a single dose of RSV vaccine using shared clinical decision-making (SCDM)   Tetanus (Td) Vaccine - COST NOT COVERED BY MEDICARE PART B Following completion of primary series, a booster dose should be given every 10 years to maintain immunity against tetanus. Td may also be given as tetanus wound prophylaxis. Tdap Vaccine - COST NOT COVERED BY MEDICARE PART B Recommended at least once for all adults. For pregnant patients, recommended with each pregnancy.    Shingles Vaccine (Shingrix) - COST NOT COVERED BY MEDICARE PART B  2 shot series recommended in those 19 years and older who have or will have weakened immune systems or those 50 years and older     Health Maintenance Due:      Topic Date Due   • Hepatitis C Screening  Never done   • Breast Cancer Screening: Mammogram  Never done   • Colorectal Cancer Screening  05/02/2026     Immunizations Due:      Topic Date Due   • Pneumococcal Vaccine: 65+ Years (1 - PCV) Never done   • Hepatitis A Vaccine (1 of 2 - Risk 2-dose series) Never done   • Hepatitis B Vaccine (1 of 3 - Risk 3-dose series) Never done   • COVID-19 Vaccine (5 - Moderna series) 03/08/2023   • Influenza Vaccine (1) 09/01/2023     Advance Directives   What are advance directives? Advance directives are legal documents that state your wishes and plans for medical care. These plans are made ahead of time in case you lose your ability to make decisions for yourself. Advance directives can apply to any medical decision, such as the treatments you want, and if you want to donate organs. What are the types of advance directives? There are many types of advance directives, and each state has rules about how to use them. You may choose a combination of any of the following:  Living will: This is a written record of the treatment you want. You can also choose which treatments you do not want, which to limit, and which to stop at a certain time. This includes surgery, medicine, IV fluid, and tube feedings. Durable power of  for [facility]): This is a written record that states who you want to make healthcare choices for you when you are unable to make them for yourself. This person, called a proxy, is usually a family member or a friend. You may choose more than 1 proxy. Do not resuscitate (DNR) order:  A DNR order is used in case your heart stops beating or you stop breathing. It is a request not to have certain forms of treatment, such as CPR. A DNR order may be included in other types of advance directives. Medical directive: This covers the care that you want if you are in a coma, near death, or unable to make decisions for yourself. You can list the treatments you want for each condition. Treatment may include pain medicine, surgery, blood transfusions, dialysis, IV or tube feedings, and a ventilator (breathing machine). Values history: This document has questions about your views, beliefs, and how you feel and think about life. This information can help others choose the care that you would choose. Why are advance directives important?   An advance directive helps you control your care. Although spoken wishes may be used, it is better to have your wishes written down. Spoken wishes can be misunderstood, or not followed. Treatments may be given even if you do not want them. An advance directive may make it easier for your family to make difficult choices about your care. Urinary Incontinence   Urinary incontinence (UI)  is when you lose control of your bladder. UI develops because your bladder cannot store or empty urine properly. The 3 most common types of UI are stress incontinence, urge incontinence, or both. Medicines:   May be given to help strengthen your bladder control. Report any side effects of medication to your healthcare provider. Do pelvic muscle exercises often:  Your pelvic muscles help you stop urinating. Squeeze these muscles tight for 5 seconds, then relax for 5 seconds. Gradually work up to squeezing for 10 seconds. Do 3 sets of 15 repetitions a day, or as directed. This will help strengthen your pelvic muscles and improve bladder control. Train your bladder:  Go to the bathroom at set times, such as every 2 hours, even if you do not feel the urge to go. You can also try to hold your urine when you feel the urge to go. For example, hold your urine for 5 minutes when you feel the urge to go. As that becomes easier, hold your urine for 10 minutes. Self-care:   Keep a UI record. Write down how often you leak urine and how much you leak. Make a note of what you were doing when you leaked urine. Drink liquids as directed. You may need to limit the amount of liquid you drink to help control your urine leakage. Do not drink any liquid right before you go to bed. Limit or do not have drinks that contain caffeine or alcohol. Prevent constipation. Eat a variety of high-fiber foods. Good examples are high-fiber cereals, beans, vegetables, and whole-grain breads.  Walking is the best way to trigger your intestines to have a bowel movement. Exercise regularly and maintain a healthy weight. Weight loss and exercise will decrease pressure on your bladder and help you control your leakage. Use a catheter as directed  to help empty your bladder. A catheter is a tiny, plastic tube that is put into your bladder to drain your urine. Go to behavior therapy as directed. Behavior therapy may be used to help you learn to control your urge to urinate. Weight Management   Why it is important to manage your weight:  Being overweight increases your risk of health conditions such as heart disease, high blood pressure, type 2 diabetes, and certain types of cancer. It can also increase your risk for osteoarthritis, sleep apnea, and other respiratory problems. Aim for a slow, steady weight loss. Even a small amount of weight loss can lower your risk of health problems. How to lose weight safely:  A safe and healthy way to lose weight is to eat fewer calories and get regular exercise. You can lose up about 1 pound a week by decreasing the number of calories you eat by 500 calories each day. Healthy meal plan for weight management:  A healthy meal plan includes a variety of foods, contains fewer calories, and helps you stay healthy. A healthy meal plan includes the following:  Eat whole-grain foods more often. A healthy meal plan should contain fiber. Fiber is the part of grains, fruits, and vegetables that is not broken down by your body. Whole-grain foods are healthy and provide extra fiber in your diet. Some examples of whole-grain foods are whole-wheat breads and pastas, oatmeal, brown rice, and bulgur. Eat a variety of vegetables every day. Include dark, leafy greens such as spinach, kale, julia greens, and mustard greens. Eat yellow and orange vegetables such as carrots, sweet potatoes, and winter squash. Eat a variety of fruits every day. Choose fresh or canned fruit (canned in its own juice or light syrup) instead of juice.  Fruit juice has very little or no fiber. Eat low-fat dairy foods. Drink fat-free (skim) milk or 1% milk. Eat fat-free yogurt and low-fat cottage cheese. Try low-fat cheeses such as mozzarella and other reduced-fat cheeses. Choose meat and other protein foods that are low in fat. Choose beans or other legumes such as split peas or lentils. Choose fish, skinless poultry (chicken or turkey), or lean cuts of red meat (beef or pork). Before you cook meat or poultry, cut off any visible fat. Use less fat and oil. Try baking foods instead of frying them. Add less fat, such as margarine, sour cream, regular salad dressing and mayonnaise to foods. Eat fewer high-fat foods. Some examples of high-fat foods include french fries, doughnuts, ice cream, and cakes. Eat fewer sweets. Limit foods and drinks that are high in sugar. This includes candy, cookies, regular soda, and sweetened drinks. Exercise:  Exercise at least 30 minutes per day on most days of the week. Some examples of exercise include walking, biking, dancing, and swimming. You can also fit in more physical activity by taking the stairs instead of the elevator or parking farther away from stores. Ask your healthcare provider about the best exercise plan for you. Narcotic (Opioid) Safety    Use narcotics safely:  Take prescribed narcotics exactly as directed  Do not give narcotics to others or take narcotics that belong to someone else  Do not mix narcotics without medicines or alcohol  Do not drive or operate heavy machinery after you take the narcotic  Monitor for side effects and notify your healthcare provider if you experienced side effects such as nausea, sleepiness, itching, or trouble thinking clearly. Manage constipation:    Constipation is the most common side effect of narcotic medicine. Constipation is when you have hard, dry bowel movements, or you go longer than usual between bowel movements.  Tell your healthcare provider about all changes in your bowel movements while you are taking narcotics. He or she may recommend laxative medicine to help you have a bowel movement. He or she may also change the kind of narcotic you are taking, or change when you take it. The following are more ways you can prevent or relieve constipation:    Drink liquids as directed. You may need to drink extra liquids to help soften and move your bowels. Ask how much liquid to drink each day and which liquids are best for you. Eat high-fiber foods. This may help decrease constipation by adding bulk to your bowel movements. High-fiber foods include fruits, vegetables, whole-grain breads and cereals, and beans. Your healthcare provider or dietitian can help you create a high-fiber meal plan. Your provider may also recommend a fiber supplement if you cannot get enough fiber from food. Exercise regularly. Regular physical activity can help stimulate your intestines. Walking is a good exercise to prevent or relieve constipation. Ask which exercises are best for you. Schedule a time each day to have a bowel movement. This may help train your body to have regular bowel movements. Bend forward while you are on the toilet to help move the bowel movement out. Sit on the toilet for at least 10 minutes, even if you do not have a bowel movement. Store narcotics safely:   Store narcotics where others cannot easily get them. Keep them in a locked cabinet or secure area. Do not  keep them in a purse or other bag you carry with you. A person may be looking for something else and find the narcotics. Make sure narcotics are stored out of the reach of children. A child can easily overdose on narcotics. Narcotics may look like candy to a small child. The best way to dispose of narcotics: The laws vary by country and area. In the [facility], the best way is to return the narcotics through a take-back program. This program is offered by the [employer]  The following are options for using the program:  Take the narcotics to a [facility] collection site. The site is often a law enforcement center. Call your local law enforcement center for scheduled take-back days in your area. You will be given information on where to go if the collection site is in a different location. Take the narcotics to an approved pharmacy or hospital.  A pharmacy or hospital may be set up as a collection site. You will need to ask if it is a [facility] collection site if you were not directed there. A pharmacy or doctor's office may not be able to take back narcotics unless it is a [facility] site. Use a mail-back system. This means you are given containers to put the narcotics into. You will then mail them in the containers. Use a take-back drop box. This is a place to leave the narcotics at any time. People and animals will not be able to get into the box. Your local law enforcement agency can tell you where to find a drop box in your area. Other ways to manage pain:   Ask your healthcare provider about non-narcotic medicines to control pain. Nonprescription medicines include NSAIDs (such as ibuprofen) and acetaminophen. Prescription medicines include muscle relaxers, antidepressants, and steroids. Pain may be managed without any medicines. Some ways to relieve pain include massage, aromatherapy, or meditation. Physical or occupational therapy may also help. For more information:   Drug Enforcement Administration  [address] [facility] , [address] [place] [address]  Phone: 7- [phone] - [phone]  Web Address: [facility].gov/drug_disposal/    [address] [place] [address]  Phone: 6- 730 - 725-0008  Web Address: http://[employer]/     © Copyright [employer] 2018 Information is for End User's use only and may not be sold, redistributed or otherwise used for commercial purposes.  All illustrations and images included in [facility] are the copyrighted property of [employer] or [address] [place] [address]

## 2023-11-22 NOTE — PROGRESS NOTES
Assessment and Plan:     Problem List Items Addressed This Visit        Digestive    Alcoholic cirrhosis of liver without ascites ([address])     Etoh cessation         Secondary esophageal varices with bleeding ([address])       Endocrine    Diabetes mellitus type 2 in nonobese [facility]) - Primary       Lab Results   Component Value Date    HGBA1C 9.3 (A) 11/22/2023     Improved, needs to check sugars more. Is going down, check A1c in 3-4 months         Relevant Orders    POCT hemoglobin A1c (Completed)    Diabetic peripheral neuropathy (HCC)    Relevant Medications    traMADol (ULTRAM) 50 mg tablet       Other    Continuous opioid dependence (HCC)    Relevant Medications    traMADol (ULTRAM) 50 mg tablet   Other Visit Diagnoses     Strain of right trapezius muscle, sequela        Encounter for annual wellness visit (AWV) in Medicare patient            BMI Counseling: Body mass index is 29.79 kg/m². The BMI is above normal. Nutrition recommendations include decreasing portion sizes, encouraging healthy choices of fruits and vegetables, decreasing fast food intake, consuming healthier snacks, limiting drinks that contain sugar and moderation in carbohydrate intake. Exercise recommendations include moderate physical activity 150 minutes/week, exercising 3-5 times per week and strength training exercises. No pharmacotherapy was ordered. Rationale for BMI follow-up plan is due to patient being overweight or obese. Preventive health issues were discussed with patient, and age appropriate screening tests were ordered as noted in patient's After Visit Summary. Personalized health advice and appropriate referrals for health education or preventive services given if needed, as noted in patient's After Visit Summary. History of Present Illness:     Patient presents for a Medicare Wellness Visit    Diabetes  Pertinent negatives for hypoglycemia include no headaches.  Pertinent negatives for diabetes include no chest pain, no fatigue and no weakness. Not checking sugars, endocrinologist is retiring . Does not want GCM because she is worried about needle breaking in her skin and it kep going off for high sugars. Not really check ing sugars. Reports compliance with medications  last check oif sugars was 90';s in AM and afternoon was 200    Has been working on exercising as well, had some R shoulder pain and went over to the left shoulder, radiated up towards the neck. Also now lower back has been acting up with      Patient Care Team:  Elizabeth Brown MD as PCP - General (Family Medicine)     Review of Systems:     Review of Systems   Constitutional:  Negative for chills, fatigue and fever. HENT:  Negative for rhinorrhea and sore throat. Eyes:  Negative for visual disturbance. Respiratory:  Negative for cough and shortness of breath. Cardiovascular:  Negative for chest pain and palpitations. Gastrointestinal:  Negative for abdominal pain, constipation, diarrhea, nausea and vomiting. Genitourinary:  Negative for difficulty urinating, dysuria and frequency. Musculoskeletal:  Negative for arthralgias and myalgias. Skin:  Negative for color change and rash. Neurological:  Negative for weakness and headaches.         Problem List:     Patient Active Problem List   Diagnosis   • Compression fracture of lumbar vertebra (HCC)   • Alcoholic cirrhosis of liver without ascites (HCC)   • Diabetes mellitus type 2 in nonobese [facility])   • Secondary esophageal varices with bleeding (HCC)   • Continuous opioid dependence ([address])   • Diabetic peripheral neuropathy ([address])   • Stage 3 chronic kidney disease, unspecified whether stage 3a or 3b CKD ([address])      Past Medical and Surgical History:     Past Medical History:   Diagnosis Date   • Acute blood loss anemia 02/20/2021   • Acute embolism and thrombosis of other specified deep vein of right lower extremity ([address]) 34/44/4027   • ALC (alcoholic liver cirrhosis) (HCC)    • Colon polyp    • Diabetes mellitus ([address])    • Edema 02/23/2021   • HLD (hyperlipidemia)    • Hypertension    • Neuropathy    • Neuropathy 02/20/2021   • Pleural effusion 02/20/2021   • Seasonal allergies      Past Surgical History:   Procedure Laterality Date   • BREAST BIOPSY     • CHOLECYSTECTOMY     • NASAL SEPTUM SURGERY        Family History:     Family History   Problem Relation Age of Onset   • Emphysema Mother    • Heart attack Father    • Heart disease Father       Social History:     Social History     Socioeconomic History   • Marital status: /Civil Union     Spouse name: None   • Number of children: None   • Years of education: None   • Highest education level: None   Occupational History   • None   Tobacco Use   • Smoking status: Never   • Smokeless tobacco: Never   Vaping Use   • Vaping Use: Never used   Substance and Sexual Activity   • Alcohol use: Not Currently   • Drug use: Yes     Types: Marijuana     Comment:  unknown   • Sexual activity: None   Other Topics Concern   • None   Social History Narrative   • None     Social Determinants of Health     Financial Resource Strain: Low Risk  (11/22/2023)    Overall Financial Resource Strain (CARDIA)    • Difficulty of Paying Living Expenses: Not hard at all   Food Insecurity: Not on file   Transportation Needs: No Transportation Needs (11/22/2023)    PRAPARE - Transportation    • Lack of Transportation (Medical): No    • Lack of Transportation (Non-Medical): No   Physical Activity: Not on file   Stress: Not on file   Social Connections: Not on file   Intimate Partner Violence: Not on file   Housing Stability: Not on file      Medications and Allergies:     Current Outpatient Medications   Medication Sig Dispense Refill   • Blood Glucose Monitoring Suppl ([employer] Blood Glucose Meter) w/Device KIT OneTouch Ultra.  Use TID. E11.65     • CALCIUM PO Take by mouth     • carvedilol (COREG) 3.125 mg tablet TAKE 1 TABLET TWICE A DAY WITH MEALS 180 tablet 3   • Continuous Blood Gluc Transmit (Dexcom G6 Transmitter) MISC Change every 3 months. E11.65     • Empagliflozin (Jardiance) 25 MG TABS Take 1 tablet (25 mg total) by mouth daily 90 tablet 1   • ferrous sulfate 325 (65 Fe) mg tablet Take 1 tablet by mouth every other day     • furosemide (LASIX) 40 mg tablet TAKE 1 TABLET DAILY 90 tablet 3   • glimepiride (AMARYL) 4 mg tablet TAKE 1 TABLET TWICE A  tablet 5   • insulin degludec (TRESIBA) 100 units/mL injection pen 20 units at bedtime 15 mL 3   • Lancets (OneTouch Delica Plus [id number]) MISC USE THREE TIMES A  each 2   • loratadine (CLARITIN) 10 mg tablet TAKE 1 TABLET BY MOUTH EVERY DAY *NOT COV BY INS* 90 tablet 1   • MULTIPLE VITAMIN PO Take 1 tablet by mouth     • pantoprazole (PROTONIX) 40 mg tablet TAKE 1 TABLET BY MOUTH EVERY DAY 90 tablet 1   • sodium chloride (EAMON 128) 5 % hypertonic ophthalmic solution Administer 1 drop to both eyes every evening     • traMADol (ULTRAM) 50 mg tablet Take 1 tablet (50 mg total) by mouth daily as needed for severe pain 10 tablet 0   • Cholecalciferol (VITAMIN D3 PO) Take by mouth (Patient not taking: Reported on 8/30/2023)     • gabapentin (NEURONTIN) 300 mg capsule Take 2 capsules (600 mg total) by mouth 3 (three) times a day 540 capsule 1   • metFORMIN (GLUCOPHAGE) 500 mg tablet Take 1 tablet (500 mg total) by mouth 3 (three) times a day 270 tablet 2     No current facility-administered medications for this visit. Allergies   Allergen Reactions   • Bee Pollen Other (See Comments)     Other reaction(s):  Other (See Comments)   • Pollen Extract Other (See Comments)      Immunizations:     Immunization History   Administered Date(s) Administered   • COVID-19 MODERNA VACC 0.5 ML IM 03/24/2021, 04/21/2021, 12/08/2021, 11/08/2022   • COVID-19 Moderna Vac BIVALENT 12 Yr+ IM 0.5 ML 11/08/2022   • INFLUENZA 02/24/2022, 09/30/2022   • Influenza, Seasonal Vaccine, Quadrivalent, Adjuvanted, .5e 02/24/2022, 09/30/2022      Health Maintenance:         Topic Date Due   • Hepatitis C Screening  Never done   • Breast Cancer Screening: Mammogram  Never done   • Colorectal Cancer Screening  05/02/2026         Topic Date Due   • Pneumococcal Vaccine: 65+ Years (1 - PCV) Never done   • Hepatitis A Vaccine (1 of 2 - Risk 2-dose series) Never done   • Hepatitis B Vaccine (1 of 3 - Risk 3-dose series) Never done   • COVID-19 Vaccine (5 - Moderna series) 03/08/2023   • Influenza Vaccine (1) 09/01/2023      Medicare Screening Tests and Risk Assessments: Ward Angel is here for her Subsequent Wellness visit. Last Medicare Wellness visit information reviewed, patient interviewed, no change since last AWV. Health Risk Assessment:   Patient rates overall health as fair. Patient feels that their physical health rating is slightly better. Patient is satisfied with their life. Eyesight was rated as same. Hearing was rated as slightly worse. Patient feels that their emotional and mental health rating is same. Patients states they are sometimes angry. Patient states they are often unusually tired/fatigued. Pain experienced in the last 7 days has been a lot. Patient's pain rating has been 6/10. Patient states that she has experienced weight loss or gain in last 6 months. Fall Risk Screening: In the past year, patient has experienced: no history of falling in past year      Urinary Incontinence Screening:   Patient has leaked urine accidently in the last six months. Home Safety:  Patient has trouble with stairs inside or outside of their home. Patient has working smoke alarms and has working carbon monoxide detector. Home safety hazards include: none. Nutrition:   Current diet is Regular. Medications:   Patient is currently taking over-the-counter supplements. OTC medications include: see medication list. Patient is able to manage medications.      Activities of Daily Living (ADLs)/Instrumental Activities of Daily Living (IADLs):   Walk and transfer into and out of bed and chair?: Yes  Dress and groom yourself?: Yes    Bathe or shower yourself?: No    Feed yourself? Yes  Do your laundry/housekeeping?: No  Manage your money, pay your bills and track your expenses?: No  Make your own meals?: Yes    Do your own shopping?: Yes    Previous Hospitalizations:   Any hospitalizations or ED visits within the last 12 months?: No      Advance Care Planning:   Living will: No    Durable POA for healthcare: No      PREVENTIVE SCREENINGS      Cardiovascular Screening:    General: Screening Current      Diabetes Screening:     General: Screening Not Indicated and History Diabetes      Colorectal Cancer Screening:     General: Screening Current      Cervical Cancer Screening:    General: Screening Not Indicated      Lung Cancer Screening:     General: Screening Not Indicated    Screening, Brief Intervention, and Referral to Treatment (SBIRT)    Screening  Typical number of drinks in a day: 0  Typical number of drinks in a week: 0  Interpretation: Low risk drinking behavior. Single Item Drug Screening:  How often have you used an illegal drug (including marijuana) or a prescription medication for non-medical reasons in the past year? never    Single Item Drug Screen Score: 0  Interpretation: Negative screen for possible drug use disorder    Review of Current Opioid Use    Opioid Risk Tool (ORT) Interpretation: Complete Opioid Risk Tool (ORT)    No results found. Physical Exam:     /70   Pulse 88   Temp 99 °F (37.2 °C)   Ht 4' 10.5" (1.486 m)   Wt 65.8 kg (145 lb)   SpO2 98%   BMI 29.79 kg/m²     Physical Exam  Constitutional:       General: She is not in acute distress. Appearance: Normal appearance. She is not ill-appearing. HENT:      Head: Normocephalic and atraumatic.       Right Ear: Tympanic membrane, ear canal and external ear normal.      Left Ear: Tympanic membrane, ear canal and external ear normal.      Nose: Nose normal. Mouth/Throat:      Mouth: Mucous membranes are moist.      Pharynx: Oropharynx is clear. No oropharyngeal exudate or posterior oropharyngeal erythema. Eyes:      General: No scleral icterus. Right eye: No discharge. Left eye: No discharge. Extraocular Movements: Extraocular movements intact. Conjunctiva/sclera: Conjunctivae normal.      Pupils: Pupils are equal, round, and reactive to light. Cardiovascular:      Rate and Rhythm: Normal rate and regular rhythm. Pulses: Normal pulses. no weak pulses          Dorsalis pedis pulses are 2+ on the right side and 2+ on the left side. Posterior tibial pulses are 2+ on the right side and 2+ on the left side. Heart sounds: Normal heart sounds. No murmur heard. Pulmonary:      Effort: Pulmonary effort is normal. No respiratory distress. Breath sounds: Normal breath sounds. Abdominal:      General: Bowel sounds are normal.      Palpations: Abdomen is soft. Tenderness: There is no abdominal tenderness. Musculoskeletal:         General: Normal range of motion. Cervical back: Normal range of motion and neck supple. Feet:      Right foot:      Skin integrity: No ulcer, skin breakdown, erythema, warmth, callus or dry skin. Left foot:      Skin integrity: No ulcer, skin breakdown, erythema, warmth, callus or dry skin. Lymphadenopathy:      Cervical: No cervical adenopathy. Skin:     General: Skin is warm and dry. Capillary Refill: Capillary refill takes less than 2 seconds. Neurological:      General: No focal deficit present. Mental Status: She is alert and oriented to person, place, and time. Mental status is at baseline. Cranial Nerves: No cranial nerve deficit. Psychiatric:         Mood and Affect: Mood normal.          Patient's shoes and socks removed. Right Foot/Ankle   Right Foot Inspection  Skin Exam: skin normal and skin intact.  No dry skin, no warmth, no callus, no erythema, no maceration, no abnormal color, no pre-ulcer, no ulcer and no callus. Toe Exam: ROM and strength within normal limits. No swelling, no tenderness, erythema and  no right toe deformity    Sensory   Monofilament testing: intact    Vascular  Capillary refills: < 3 seconds  The right DP pulse is 2+. The right PT pulse is 2+. Left Foot/Ankle  Left Foot Inspection  Skin Exam: skin normal and skin intact. No dry skin, no warmth, no erythema, no maceration, normal color, no pre-ulcer, no ulcer and no callus. Toe Exam: ROM and strength within normal limits. No swelling, no tenderness, no erythema and no left toe deformity. Sensory   Monofilament testing: intact    Vascular  Capillary refills: < 3 seconds  The left DP pulse is 2+. The left PT pulse is 2+.      Assign Risk Category  No deformity present  No loss of protective sensation  No weak pulses  Risk: 0        Bonny Marin MD

## 2023-11-22 NOTE — ASSESSMENT & PLAN NOTE
Lab Results   Component Value Date    HGBA1C 9.3 (A) 11/22/2023     Improved, needs to check sugars more.  Is going down, check A1c in 3-4 months

## 2023-12-05 ENCOUNTER — TELEPHONE (OUTPATIENT)
Dept: FAMILY MEDICINE CLINIC | Facility: CLINIC | Age: 73
End: 2023-12-05

## 2023-12-05 DIAGNOSIS — J30.2 SEASONAL ALLERGIES: ICD-10-CM

## 2023-12-05 DIAGNOSIS — E11.9 DIABETES MELLITUS TYPE 2 IN NONOBESE (HCC): Primary | ICD-10-CM

## 2023-12-05 RX ORDER — LORATADINE 10 MG/1
10 TABLET ORAL DAILY
Qty: 90 TABLET | Refills: 1 | Status: SHIPPED | OUTPATIENT
Start: 2023-12-05

## 2023-12-05 RX ORDER — ATORVASTATIN CALCIUM 20 MG/1
20 TABLET, FILM COATED ORAL DAILY
Qty: 90 TABLET | Refills: 3 | Status: SHIPPED | OUTPATIENT
Start: 2023-12-05

## 2023-12-08 DIAGNOSIS — E11.9 DIABETES MELLITUS TYPE 2 IN NONOBESE (HCC): ICD-10-CM

## 2023-12-08 NOTE — TELEPHONE ENCOUNTER
Pt called - stating her [facility] is unable to reach Dr.Leyla Kinney's  office for a refill for her tresiba - pt stating doctor retired and office isn't returning her calls. Pt is completely out of her insulin, req  to review and approve.        Order pended (request received earlier)    Please advise

## 2023-12-11 RX ORDER — INSULIN GLARGINE 100 [IU]/ML
20 INJECTION, SOLUTION SUBCUTANEOUS
Qty: 6 ML | Refills: 3 | Status: SHIPPED | OUTPATIENT
Start: 2023-12-11 | End: 2024-01-10

## 2023-12-18 ENCOUNTER — TELEPHONE (OUTPATIENT)
Dept: FAMILY MEDICINE CLINIC | Facility: CLINIC | Age: 73
End: 2023-12-18

## 2023-12-18 NOTE — TELEPHONE ENCOUNTER
T/c from pt - pt was rx'd Insulin Glargine Solostar (Lantus SoloStar) 100 UNIT/ML on 12/11/2023 as an alternative to  insulin degludec (TRESIBA) 100 units/mL injection pen  - Discontinued by: Kelvin Lorenzo MD on 12/11/2023 07:51  - pt will call her insurance to find out covered alternatives.   Pts chart / media reviewed - no encounter regards med alternatives / no incoming faxes as of 12/18/2023 11:36 AM.     Pt was contacted earlier by her pharmacists.     All questions/concerns were answered.  Pt has no further questions.

## 2023-12-28 ENCOUNTER — TELEPHONE (OUTPATIENT)
Dept: FAMILY MEDICINE CLINIC | Facility: CLINIC | Age: 73
End: 2023-12-28

## 2023-12-28 NOTE — TELEPHONE ENCOUNTER
T/c from pt -- reports her right ear is clogged and she can not hear.  Has been using debrox, but that does not help.  Used peroxide and that helped a little bit.  Would like to try more tonight, but wants to check with a provider (Dr Lorenzo out), to make sure this is ok to use, before she tries.    Please advise.

## 2024-01-03 ENCOUNTER — APPOINTMENT (OUTPATIENT)
Age: 74
End: 2024-01-03
Payer: COMMERCIAL

## 2024-01-03 DIAGNOSIS — N18.30 STAGE 3 CHRONIC KIDNEY DISEASE, UNSPECIFIED WHETHER STAGE 3A OR 3B CKD (HCC): ICD-10-CM

## 2024-01-03 LAB
25(OH)D3 SERPL-MCNC: 62.1 NG/ML (ref 30–100)
ANION GAP SERPL CALCULATED.3IONS-SCNC: 13 MMOL/L
BACTERIA UR QL AUTO: ABNORMAL /HPF
BASOPHILS # BLD AUTO: 0.04 THOUSANDS/ÂΜL (ref 0–0.1)
BASOPHILS NFR BLD AUTO: 1 % (ref 0–1)
BILIRUB UR QL STRIP: NEGATIVE
BUN SERPL-MCNC: 25 MG/DL (ref 5–25)
CALCIUM SERPL-MCNC: 9 MG/DL (ref 8.4–10.2)
CHLORIDE SERPL-SCNC: 99 MMOL/L (ref 96–108)
CLARITY UR: CLEAR
CO2 SERPL-SCNC: 26 MMOL/L (ref 21–32)
COLOR UR: ABNORMAL
CREAT SERPL-MCNC: 1.48 MG/DL (ref 0.6–1.3)
CREAT UR-MCNC: 52 MG/DL
EOSINOPHIL # BLD AUTO: 0.64 THOUSAND/ÂΜL (ref 0–0.61)
EOSINOPHIL NFR BLD AUTO: 9 % (ref 0–6)
ERYTHROCYTE [DISTWIDTH] IN BLOOD BY AUTOMATED COUNT: 16 % (ref 11.6–15.1)
GFR SERPL CREATININE-BSD FRML MDRD: 35 ML/MIN/1.73SQ M
GLUCOSE P FAST SERPL-MCNC: 274 MG/DL (ref 65–99)
GLUCOSE UR STRIP-MCNC: ABNORMAL MG/DL
HCT VFR BLD AUTO: 26.5 % (ref 34.8–46.1)
HGB BLD-MCNC: 7.8 G/DL (ref 11.5–15.4)
HGB UR QL STRIP.AUTO: NEGATIVE
IMM GRANULOCYTES # BLD AUTO: 0.01 THOUSAND/UL (ref 0–0.2)
IMM GRANULOCYTES NFR BLD AUTO: 0 % (ref 0–2)
KETONES UR STRIP-MCNC: NEGATIVE MG/DL
LEUKOCYTE ESTERASE UR QL STRIP: ABNORMAL
LYMPHOCYTES # BLD AUTO: 0.78 THOUSANDS/ÂΜL (ref 0.6–4.47)
LYMPHOCYTES NFR BLD AUTO: 11 % (ref 14–44)
MCH RBC QN AUTO: 21.5 PG (ref 26.8–34.3)
MCHC RBC AUTO-ENTMCNC: 29.4 G/DL (ref 31.4–37.4)
MCV RBC AUTO: 73 FL (ref 82–98)
MICROALBUMIN UR-MCNC: <7 MG/L
MICROALBUMIN/CREAT 24H UR: <13 MG/G CREATININE (ref 0–30)
MONOCYTES # BLD AUTO: 0.42 THOUSAND/ÂΜL (ref 0.17–1.22)
MONOCYTES NFR BLD AUTO: 6 % (ref 4–12)
NEUTROPHILS # BLD AUTO: 5.36 THOUSANDS/ÂΜL (ref 1.85–7.62)
NEUTS SEG NFR BLD AUTO: 73 % (ref 43–75)
NITRITE UR QL STRIP: NEGATIVE
NON-SQ EPI CELLS URNS QL MICRO: ABNORMAL /HPF
NRBC BLD AUTO-RTO: 0 /100 WBCS
PH UR STRIP.AUTO: 6 [PH]
PHOSPHATE SERPL-MCNC: 4 MG/DL (ref 2.3–4.1)
PLATELET # BLD AUTO: 193 THOUSANDS/UL (ref 149–390)
PMV BLD AUTO: 11.3 FL (ref 8.9–12.7)
POTASSIUM SERPL-SCNC: 4.4 MMOL/L (ref 3.5–5.3)
PROT UR STRIP-MCNC: NEGATIVE MG/DL
PTH-INTACT SERPL-MCNC: 126.3 PG/ML (ref 12–88)
RBC # BLD AUTO: 3.63 MILLION/UL (ref 3.81–5.12)
RBC #/AREA URNS AUTO: ABNORMAL /HPF
SODIUM SERPL-SCNC: 138 MMOL/L (ref 135–147)
SP GR UR STRIP.AUTO: 1.02 (ref 1–1.03)
URATE SERPL-MCNC: 6.8 MG/DL (ref 2–7.5)
UROBILINOGEN UR STRIP-ACNC: <2 MG/DL
WBC # BLD AUTO: 7.25 THOUSAND/UL (ref 4.31–10.16)
WBC #/AREA URNS AUTO: ABNORMAL /HPF

## 2024-01-03 PROCEDURE — 83970 ASSAY OF PARATHORMONE: CPT

## 2024-01-03 PROCEDURE — 80048 BASIC METABOLIC PNL TOTAL CA: CPT

## 2024-01-03 PROCEDURE — 84100 ASSAY OF PHOSPHORUS: CPT

## 2024-01-03 PROCEDURE — 85025 COMPLETE CBC W/AUTO DIFF WBC: CPT

## 2024-01-03 PROCEDURE — 36415 COLL VENOUS BLD VENIPUNCTURE: CPT

## 2024-01-03 PROCEDURE — 82306 VITAMIN D 25 HYDROXY: CPT

## 2024-01-03 PROCEDURE — 84550 ASSAY OF BLOOD/URIC ACID: CPT

## 2024-02-01 DIAGNOSIS — E11.42 DIABETIC PERIPHERAL NEUROPATHY (HCC): ICD-10-CM

## 2024-02-01 RX ORDER — EMPAGLIFLOZIN 25 MG/1
25 TABLET, FILM COATED ORAL DAILY
Qty: 90 TABLET | Refills: 3 | Status: SHIPPED | OUTPATIENT
Start: 2024-02-01

## 2024-02-21 ENCOUNTER — OFFICE VISIT (OUTPATIENT)
Dept: FAMILY MEDICINE CLINIC | Facility: CLINIC | Age: 74
End: 2024-02-21
Payer: COMMERCIAL

## 2024-02-21 ENCOUNTER — APPOINTMENT (OUTPATIENT)
Age: 74
End: 2024-02-21
Payer: COMMERCIAL

## 2024-02-21 VITALS
DIASTOLIC BLOOD PRESSURE: 62 MMHG | BODY MASS INDEX: 28.83 KG/M2 | OXYGEN SATURATION: 97 % | SYSTOLIC BLOOD PRESSURE: 142 MMHG | HEART RATE: 86 BPM | HEIGHT: 59 IN | WEIGHT: 143 LBS

## 2024-02-21 DIAGNOSIS — E11.42 DIABETIC PERIPHERAL NEUROPATHY (HCC): ICD-10-CM

## 2024-02-21 DIAGNOSIS — F11.20 CONTINUOUS OPIOID DEPENDENCE (HCC): ICD-10-CM

## 2024-02-21 DIAGNOSIS — E11.9 DIABETES MELLITUS TYPE 2 IN NONOBESE (HCC): Primary | ICD-10-CM

## 2024-02-21 DIAGNOSIS — R25.2 LEG CRAMPING: ICD-10-CM

## 2024-02-21 DIAGNOSIS — E11.9 DIABETES MELLITUS TYPE 2 IN NONOBESE (HCC): ICD-10-CM

## 2024-02-21 DIAGNOSIS — D69.6 THROMBOCYTOPENIA (HCC): ICD-10-CM

## 2024-02-21 DIAGNOSIS — N18.30 STAGE 3 CHRONIC KIDNEY DISEASE, UNSPECIFIED WHETHER STAGE 3A OR 3B CKD (HCC): ICD-10-CM

## 2024-02-21 DIAGNOSIS — I85.11 SECONDARY ESOPHAGEAL VARICES WITH BLEEDING (HCC): ICD-10-CM

## 2024-02-21 LAB
ALBUMIN SERPL BCP-MCNC: 3.6 G/DL (ref 3.5–5)
ALP SERPL-CCNC: 58 U/L (ref 34–104)
ALT SERPL W P-5'-P-CCNC: 15 U/L (ref 7–52)
ANION GAP SERPL CALCULATED.3IONS-SCNC: 13 MMOL/L
AST SERPL W P-5'-P-CCNC: 16 U/L (ref 13–39)
BASOPHILS # BLD AUTO: 0.06 THOUSANDS/ÂΜL (ref 0–0.1)
BASOPHILS NFR BLD AUTO: 1 % (ref 0–1)
BILIRUB SERPL-MCNC: 0.35 MG/DL (ref 0.2–1)
BUN SERPL-MCNC: 30 MG/DL (ref 5–25)
CALCIUM SERPL-MCNC: 9.3 MG/DL (ref 8.4–10.2)
CHLORIDE SERPL-SCNC: 101 MMOL/L (ref 96–108)
CO2 SERPL-SCNC: 26 MMOL/L (ref 21–32)
CREAT SERPL-MCNC: 1.39 MG/DL (ref 0.6–1.3)
CREAT UR-MCNC: 31.2 MG/DL
EOSINOPHIL # BLD AUTO: 1.02 THOUSAND/ÂΜL (ref 0–0.61)
EOSINOPHIL NFR BLD AUTO: 14 % (ref 0–6)
ERYTHROCYTE [DISTWIDTH] IN BLOOD BY AUTOMATED COUNT: 17 % (ref 11.6–15.1)
EST. AVERAGE GLUCOSE BLD GHB EST-MCNC: 229 MG/DL
GFR SERPL CREATININE-BSD FRML MDRD: 38 ML/MIN/1.73SQ M
GLUCOSE P FAST SERPL-MCNC: 268 MG/DL (ref 65–99)
HBA1C MFR BLD: 9.6 %
HCT VFR BLD AUTO: 25.3 % (ref 34.8–46.1)
HGB BLD-MCNC: 7.3 G/DL (ref 11.5–15.4)
IMM GRANULOCYTES # BLD AUTO: 0.02 THOUSAND/UL (ref 0–0.2)
IMM GRANULOCYTES NFR BLD AUTO: 0 % (ref 0–2)
LYMPHOCYTES # BLD AUTO: 0.74 THOUSANDS/ÂΜL (ref 0.6–4.47)
LYMPHOCYTES NFR BLD AUTO: 10 % (ref 14–44)
MCH RBC QN AUTO: 20.7 PG (ref 26.8–34.3)
MCHC RBC AUTO-ENTMCNC: 28.9 G/DL (ref 31.4–37.4)
MCV RBC AUTO: 72 FL (ref 82–98)
MICROALBUMIN UR-MCNC: <7 MG/L
MICROALBUMIN/CREAT 24H UR: <22 MG/G CREATININE (ref 0–30)
MONOCYTES # BLD AUTO: 0.53 THOUSAND/ÂΜL (ref 0.17–1.22)
MONOCYTES NFR BLD AUTO: 7 % (ref 4–12)
NEUTROPHILS # BLD AUTO: 4.88 THOUSANDS/ÂΜL (ref 1.85–7.62)
NEUTS SEG NFR BLD AUTO: 68 % (ref 43–75)
NRBC BLD AUTO-RTO: 0 /100 WBCS
PLATELET # BLD AUTO: 209 THOUSANDS/UL (ref 149–390)
PMV BLD AUTO: 10.7 FL (ref 8.9–12.7)
POTASSIUM SERPL-SCNC: 4.4 MMOL/L (ref 3.5–5.3)
PROT SERPL-MCNC: 6.2 G/DL (ref 6.4–8.4)
RBC # BLD AUTO: 3.53 MILLION/UL (ref 3.81–5.12)
SODIUM SERPL-SCNC: 140 MMOL/L (ref 135–147)
WBC # BLD AUTO: 7.25 THOUSAND/UL (ref 4.31–10.16)

## 2024-02-21 PROCEDURE — 36415 COLL VENOUS BLD VENIPUNCTURE: CPT

## 2024-02-21 PROCEDURE — 83036 HEMOGLOBIN GLYCOSYLATED A1C: CPT

## 2024-02-21 PROCEDURE — 80053 COMPREHEN METABOLIC PANEL: CPT

## 2024-02-21 PROCEDURE — 82043 UR ALBUMIN QUANTITATIVE: CPT

## 2024-02-21 PROCEDURE — 99214 OFFICE O/P EST MOD 30 MIN: CPT | Performed by: STUDENT IN AN ORGANIZED HEALTH CARE EDUCATION/TRAINING PROGRAM

## 2024-02-21 PROCEDURE — 83036 HEMOGLOBIN GLYCOSYLATED A1C: CPT | Performed by: STUDENT IN AN ORGANIZED HEALTH CARE EDUCATION/TRAINING PROGRAM

## 2024-02-21 PROCEDURE — G2211 COMPLEX E/M VISIT ADD ON: HCPCS | Performed by: STUDENT IN AN ORGANIZED HEALTH CARE EDUCATION/TRAINING PROGRAM

## 2024-02-21 PROCEDURE — 85025 COMPLETE CBC W/AUTO DIFF WBC: CPT

## 2024-02-21 PROCEDURE — 82570 ASSAY OF URINE CREATININE: CPT

## 2024-02-21 RX ORDER — MAGNESIUM GLYCINATE 100 MG
1 CAPSULE ORAL
Qty: 30 CAPSULE | Refills: 5 | Status: SHIPPED | OUTPATIENT
Start: 2024-02-21 | End: 2024-02-28

## 2024-02-21 NOTE — ASSESSMENT & PLAN NOTE
Lab Results   Component Value Date    HGBA1C 9.3 (A) 11/22/2023     Follow up labs, keep on same

## 2024-02-21 NOTE — ASSESSMENT & PLAN NOTE
Lab Results   Component Value Date    EGFR 35 01/03/2024    EGFR 40 08/09/2023    EGFR 52 (L) 02/21/2023    CREATININE 1.48 (H) 01/03/2024    CREATININE 1.33 (H) 08/09/2023    CREATININE 1.13 (H) 02/21/2023     Has follow up with nephrology

## 2024-02-21 NOTE — PROGRESS NOTES
Assessment/Plan:         Problem List Items Addressed This Visit        Digestive    Secondary esophageal varices with bleeding (HCC)     Follows with gi            Endocrine    Diabetes mellitus type 2 in nonobese (HCC) - Primary    Relevant Orders    POCT hemoglobin A1c (Completed)    Albumin / creatinine urine ratio    Comprehensive metabolic panel    Hemoglobin A1C    Diabetic peripheral neuropathy (HCC)       Lab Results   Component Value Date    HGBA1C 9.3 (A) 11/22/2023     Follow up labs, keep on same            Hematopoietic and Hemostatic    RESOLVED: Thrombocytopenia (HCC)       Genitourinary    Stage 3 chronic kidney disease, unspecified whether stage 3a or 3b CKD (HCC)     Lab Results   Component Value Date    EGFR 35 01/03/2024    EGFR 40 08/09/2023    EGFR 52 (L) 02/21/2023    CREATININE 1.48 (H) 01/03/2024    CREATININE 1.33 (H) 08/09/2023    CREATININE 1.13 (H) 02/21/2023     Has follow up with nephrology            Other    Continuous opioid dependence (HCC)   Other Visit Diagnoses     Leg cramping        Relevant Medications    Magnesium Glycinate 665 MG CAPS        Poct A1c [facility] states code #103, hemoglobin to low. Sarita sk her to getv labs, ossibly wrosening anemia?    Magnesium for the crmaps    Subjective:      Patient ID: Iman Walden is a 73 y.o. female.    HPI    Follow up diabetes    Leg cramps on the back of the legs, wakes her up occurs once a week. Has been doing her normal exercises, denies being dehydrated. Last one was this morning at 3:45am    Gets diabetic eye exams at [facility] eye with Dr Franco    The following portions of the patient's history were reviewed and updated as appropriate:   Past Medical History:  She has a past medical history of Acute blood loss anemia (02/20/2021), Acute embolism and thrombosis of other specified deep vein of right lower extremity (HCC) (11/16/2022), ALC (alcoholic liver cirrhosis) (HCC), Colon polyp, Diabetes mellitus (HCC),  Edema (02/23/2021), HLD (hyperlipidemia), Hypertension, Neuropathy, Neuropathy (02/20/2021), Pleural effusion (02/20/2021), Seasonal allergies, and Thrombocytopenia (HCC).,  _______________________________________________________________________  Medical Problems:  does not have any pertinent problems on file.,  _______________________________________________________________________  Past Surgical History:   has a past surgical history that includes Breast biopsy; Cholecystectomy; and Nasal septum surgery.,  _______________________________________________________________________  Family History:  family history includes Emphysema in her mother; Heart attack in her father; Heart disease in her father.,  _______________________________________________________________________  Social History:   reports that she has never smoked. She has never used smokeless tobacco. She reports that she does not currently use alcohol. She reports current drug use. Drug: Marijuana.,  _______________________________________________________________________  Allergies:  is allergic to bee pollen and pollen extract..  _______________________________________________________________________  Current Outpatient Medications   Medication Sig Dispense Refill   • atorvastatin (LIPITOR) 20 mg tablet Take 1 tablet (20 mg total) by mouth daily 90 tablet 3   • Blood Glucose Monitoring Suppl ([employer] Blood Glucose Meter) w/Device KIT OneTouch Ultra. Use TID. E11.65     • CALCIUM PO Take by mouth     • carvedilol (COREG) 3.125 mg tablet TAKE 1 TABLET TWICE A DAY WITH MEALS 180 tablet 3   • Cholecalciferol (VITAMIN D3 PO) Take by mouth     • Continuous Blood Gluc Transmit (Dexcom G6 Transmitter) MISC Change every 3 months. E11.65     • ferrous sulfate 325 (65 Fe) mg tablet Take 1 tablet by mouth every other day     • furosemide (LASIX) 40 mg tablet TAKE 1 TABLET DAILY 90 tablet 3   • glimepiride (AMARYL) 4 mg tablet TAKE 1 TABLET TWICE A  tablet 5   •  "Jardiance 25 MG TABS TAKE 1 TABLET DAILY 90 tablet 3   • Lancets (OneTouch Delica Plus [id number]) MISC USE THREE TIMES A  each 2   • loratadine (CLARITIN) 10 mg tablet TAKE 1 TABLET BY MOUTH EVERY DAY 90 tablet 1   • Magnesium Glycinate 665 MG CAPS Take 1 capsule by mouth daily at bedtime 30 capsule 5   • MULTIPLE VITAMIN PO Take 1 tablet by mouth     • pantoprazole (PROTONIX) 40 mg tablet TAKE 1 TABLET BY MOUTH EVERY DAY 90 tablet 1   • sodium chloride (EAMON 128) 5 % hypertonic ophthalmic solution Administer 1 drop to both eyes every evening     • traMADol (ULTRAM) 50 mg tablet Take 1 tablet (50 mg total) by mouth daily as needed for severe pain 10 tablet 0   • gabapentin (NEURONTIN) 300 mg capsule Take 2 capsules (600 mg total) by mouth 3 (three) times a day 540 capsule 1   • Insulin Glargine Solostar (Lantus SoloStar) 100 UNIT/ML SOPN Inject 0.2 mL (20 Units total) under the skin daily at bedtime 6 mL 3   • metFORMIN (GLUCOPHAGE) 500 mg tablet Take 1 tablet (500 mg total) by mouth 3 (three) times a day 270 tablet 2     No current facility-administered medications for this visit.     _______________________________________________________________________  Review of Systems   Constitutional:  Negative for chills, fatigue and fever.   HENT:  Negative for rhinorrhea and sore throat.    Eyes:  Negative for visual disturbance.   Respiratory:  Negative for cough and shortness of breath.    Cardiovascular:  Negative for chest pain and palpitations.   Gastrointestinal:  Negative for abdominal pain, constipation, diarrhea, nausea and vomiting.   Genitourinary:  Negative for difficulty urinating, dysuria and frequency.   Musculoskeletal:  Positive for arthralgias. Negative for myalgias.   Skin:  Negative for color change and rash.   Neurological:  Negative for weakness and headaches.         Objective:  Vitals:    02/21/24 1322   BP: 142/62   Pulse: 86   SpO2: 97%   Weight: 64.9 kg (143 lb)   Height: 4' 10.5\" (1.486 " m)     Body mass index is 29.38 kg/m².     Physical Exam  Constitutional:       General: She is not in acute distress.     Appearance: She is not ill-appearing.   HENT:      Head: Normocephalic and atraumatic.      Right Ear: External ear normal.      Left Ear: External ear normal.      Nose: Nose normal. No congestion or rhinorrhea.      Mouth/Throat:      Mouth: Mucous membranes are moist.      Pharynx: Oropharynx is clear. No oropharyngeal exudate or posterior oropharyngeal erythema.   Eyes:      Extraocular Movements: Extraocular movements intact.      Conjunctiva/sclera: Conjunctivae normal.      Pupils: Pupils are equal, round, and reactive to light.   Cardiovascular:      Rate and Rhythm: Normal rate and regular rhythm.      Pulses: Normal pulses.      Heart sounds: No murmur heard.  Pulmonary:      Effort: Pulmonary effort is normal. No respiratory distress.      Breath sounds: Normal breath sounds. No wheezing.   Chest:      Chest wall: No tenderness.   Abdominal:      General: Bowel sounds are normal.      Palpations: Abdomen is soft.      Tenderness: There is no abdominal tenderness.   Musculoskeletal:         General: Normal range of motion.      Cervical back: Normal range of motion.   Skin:     General: Skin is warm and dry.      Capillary Refill: Capillary refill takes less than 2 seconds.      Findings: No rash.   Neurological:      General: No focal deficit present.      Mental Status: She is alert. Mental status is at baseline.

## 2024-02-22 ENCOUNTER — TELEPHONE (OUTPATIENT)
Dept: ADMINISTRATIVE | Facility: OTHER | Age: 74
End: 2024-02-22

## 2024-02-22 ENCOUNTER — TELEPHONE (OUTPATIENT)
Dept: FAMILY MEDICINE CLINIC | Facility: CLINIC | Age: 74
End: 2024-02-22

## 2024-02-22 NOTE — TELEPHONE ENCOUNTER
----- Message from Annette Spain sent at 2/21/2024  1:29 PM EST -----  Regarding: care gap request  02/21/24 1:29 PM    Hello, our patient attached above has had Diabetic Eye Exam completed/performed. Please assist in updating the patient chart by making an External outreach to [facility]  facility located in [place] . The date of service is 2024.    Thank you,  Annette SOLOMON [address] [place] [address]

## 2024-02-22 NOTE — LETTER
Diabetic Eye Exam Form    Date Requested: 24  Patient: Iman Walden  Patient : 1950   Referring Provider: Kelvin Lorenzo MD      DIABETIC Eye Exam Date _______________________________      Type of Exam MUST be documented for Diabetic Eye Exams. Please CHECK ONE.     Retinal Exam       Dilated Retinal Exam       OCT       Optomap-Iris Exam      Fundus Photography       Left Eye - Please check Retinopathy or No Retinopathy        Exam did show retinopathy    Exam did not show retinopathy       Right Eye - Please check Retinopathy or No Retinopathy       Exam did show retinopathy    Exam did not show retinopathy       Comments __________________________________________________________    Practice Providing Exam ______________________________________________    Exam Performed By (print name) _______________________________________      Provider Signature ___________________________________________________      These reports are needed for  compliance.  Please fax this completed form and a copy of the Diabetic Eye Exam report to our office located at [address] [place] [address] as soon as possible via Fax [phone] rubén Paiz: Phone [phone]  We thank you for your assistance in treating our mutual patient.

## 2024-02-22 NOTE — LETTER
Diabetic Eye Exam Form    Date Requested: 24  Patient: Iman Walden  Patient : 1950   Referring Provider: Kelvin Lorenzo MD      DIABETIC Eye Exam Date _______________________________      Type of Exam MUST be documented for Diabetic Eye Exams. Please CHECK ONE.     Retinal Exam       Dilated Retinal Exam       OCT       Optomap-Iris Exam      Fundus Photography       Left Eye - Please check Retinopathy or No Retinopathy        Exam did show retinopathy    Exam did not show retinopathy       Right Eye - Please check Retinopathy or No Retinopathy       Exam did show retinopathy    Exam did not show retinopathy       Comments __________________________________________________________    Practice Providing Exam ______________________________________________    Exam Performed By (print name) _______________________________________      Provider Signature ___________________________________________________      These reports are needed for  compliance.  Please fax this completed form and a copy of the Diabetic Eye Exam report to our office located at [address] [place] [address] as soon as possible via Fax [phone] rubén Paiz: Phone [phone]  We thank you for your assistance in treating our mutual patient.

## 2024-02-22 NOTE — LETTER
Diabetic Eye Exam Form    Date Requested: 24  Patient: Iman Walden  Patient : 1950   Referring Provider: Kelvin Lorenzo MD      DIABETIC Eye Exam Date _______________________________      Type of Exam MUST be documented for Diabetic Eye Exams. Please CHECK ONE.     Retinal Exam       Dilated Retinal Exam       OCT       Optomap-Iris Exam      Fundus Photography       Left Eye - Please check Retinopathy or No Retinopathy        Exam did show retinopathy    Exam did not show retinopathy       Right Eye - Please check Retinopathy or No Retinopathy       Exam did show retinopathy    Exam did not show retinopathy       Comments __________________________________________________________    Practice Providing Exam ______________________________________________    Exam Performed By (print name) _______________________________________      Provider Signature ___________________________________________________      These reports are needed for  compliance.  Please fax this completed form and a copy of the Diabetic Eye Exam report to our office located at [address] [place] [address] as soon as possible via Fax [phone] rubén Paiz: Phone [phone]  We thank you for your assistance in treating our mutual patient.

## 2024-02-22 NOTE — TELEPHONE ENCOUNTER
Upon review of the In Basket request and the patient's chart, initial outreach has been made via fax to facility. Please see Contacts section for details.     Thank you  Chencho Foy MA

## 2024-02-26 NOTE — PROGRESS NOTES
NEPHROLOGY OFFICE NOTE    Patient: Iman Walden               Sex: female           YOB: 1950        Age:  73 y.o.       2/28/2024      BACKGROUND     I had the pleasure of seeing Iman Walden in the nephrology office today for consultation. She has a past medical history significant for alcoholic cirrhosis of the liver, secondary esophageal varices, type 2 diabetes with neuropathy, CKD stage 3. She was referred to our office for further management of CKD stage 3.     She has underlying type 2 diabetes and maintained on metformin, Jardiance, glimepiride, and insulin. She is maintained on gabapentin for management of neuropathy.  Recently referred to endocrinology and has an upcoming appointment scheduled.    She has underlying hypertension and maintained on carvedilol.  Reports blood pressure has been under good control at home.    She has a history of CKD stage 3, previously followed with Dr. Hartley with [facility] and was last seen in March of 2023.  Ports that she has switched providers.    She has underling alcoholic cirrhosis and following with Gastroenterology.  She had required 2 paracentesis in the past but recently has had no issues with abdominal ascites.  She is maintained on furosemide 40 mg daily.    She presents with her  for consultation today.  Reports that she drinks a lot of coffee at home but does not drink much water.  Avoiding use of NSAIDs.    The last blood work was done on 2/21/2024, which we have reviewed together.    SUBJECTIVE     She currently has no complaints at this time and is feeling well. Patient denies any chest pain, shortness of breath, or swelling.    REVIEW OF SYSTEMS     Review of Systems   Constitutional:  Negative for activity change, chills, fatigue and fever.   HENT:  Negative for trouble swallowing.    Respiratory:  Negative for shortness of breath.    Cardiovascular:  Negative for leg  swelling.   Gastrointestinal:  Negative for constipation, diarrhea, nausea and vomiting.   Genitourinary:  Negative for difficulty urinating, dysuria, frequency and hematuria.   Musculoskeletal:  Negative for back pain.   Skin:  Negative for pallor.   Neurological:  Negative for dizziness, syncope, weakness and light-headedness.   Psychiatric/Behavioral:  Negative for sleep disturbance. The patient is not nervous/anxious.        OBJECTIVE     Current Weight: Weight - Scale: 65.2 kg (143 lb 12.8 oz)  Vitals:    02/28/24 1452   BP: 122/74   Pulse: 92   Resp: 18   Temp: 97.6 °F (36.4 °C)   SpO2: 96%     Body mass index is 29.54 kg/m².    CURRENT MEDICATIONS       Current Outpatient Medications:     atorvastatin (LIPITOR) 20 mg tablet, Take 1 tablet (20 mg total) by mouth daily, Disp: 90 tablet, Rfl: 3    Blood Glucose Monitoring Suppl ([employer] Blood Glucose Meter) w/Device KIT, OneTouch Ultra. Use TID. E11.65, Disp: , Rfl:     CALCIUM PO, Take by mouth, Disp: , Rfl:     carvedilol (COREG) 3.125 mg tablet, TAKE 1 TABLET TWICE A DAY WITH MEALS, Disp: 180 tablet, Rfl: 3    Continuous Blood Gluc Transmit (Dexcom G6 Transmitter) MISC, Change every 3 months. E11.65, Disp: , Rfl:     ferrous sulfate 325 (65 Fe) mg tablet, Take 1 tablet by mouth every other day, Disp: , Rfl:     furosemide (LASIX) 40 mg tablet, TAKE 1 TABLET DAILY, Disp: 90 tablet, Rfl: 3    gabapentin (NEURONTIN) 300 mg capsule, Take 2 capsules (600 mg total) by mouth 3 (three) times a day, Disp: 540 capsule, Rfl: 1    glimepiride (AMARYL) 4 mg tablet, TAKE 1 TABLET TWICE A DAY, Disp: 120 tablet, Rfl: 5    Insulin Glargine Solostar (Lantus SoloStar) 100 UNIT/ML SOPN, Inject 0.3 mL (30 Units total) under the skin daily at bedtime, Disp: , Rfl:     Jardiance 25 MG TABS, TAKE 1 TABLET DAILY, Disp: 90 tablet, Rfl: 3    Lancets (OneTouch Delica Plus [id number]) MISC, USE THREE TIMES A DAY, Disp: 300 each, Rfl: 2    loratadine (CLARITIN) 10 mg tablet, TAKE 1 TABLET BY  MOUTH EVERY DAY, Disp: 90 tablet, Rfl: 1    metFORMIN (GLUCOPHAGE) 500 mg tablet, Take 1 tablet (500 mg total) by mouth 3 (three) times a day, Disp: 270 tablet, Rfl: 2    MULTIPLE VITAMIN PO, Take 1 tablet by mouth, Disp: , Rfl:     pantoprazole (PROTONIX) 40 mg tablet, TAKE 1 TABLET BY MOUTH EVERY DAY, Disp: 90 tablet, Rfl: 1    sodium chloride (EAMON 128) 5 % hypertonic ophthalmic solution, Administer 1 drop to both eyes every evening, Disp: , Rfl:     traMADol (ULTRAM) 50 mg tablet, Take 1 tablet (50 mg total) by mouth daily as needed for severe pain, Disp: 10 tablet, Rfl: 0    PHYSICAL EXAMINATION     Physical Exam  Vitals reviewed.   Constitutional:       General: She is not in acute distress.  HENT:      Head: Normocephalic.      Mouth/Throat:      Lips: Pink.      Mouth: Mucous membranes are moist.   Eyes:      General: Lids are normal. No scleral icterus.  Cardiovascular:      Rate and Rhythm: Normal rate and regular rhythm.      Heart sounds: S1 normal and S2 normal. No murmur heard.  Pulmonary:      Effort: Pulmonary effort is normal. No accessory muscle usage or respiratory distress.      Breath sounds: Normal breath sounds.   Abdominal:      General: There is no distension.      Tenderness: There is no abdominal tenderness.   Musculoskeletal:      Cervical back: Normal range of motion and neck supple. No tenderness.   Skin:     General: Skin is warm.      Coloration: Skin is not cyanotic or jaundiced.   Neurological:      General: No focal deficit present.      Mental Status: She is alert and oriented to person, place, and time.   Psychiatric:         Attention and Perception: Attention normal.         Speech: Speech normal.         Behavior: Behavior is cooperative.           LAB RESULTS     CMP 2/21/2024:  Sodium 140  Potassium 4.4  Chloride 101  CO2 26  Anion gap 13  BUN 30  Creatinine 1.39  Glucose 268  Calcium 9.3  eGFR 38        RADIOLOGY RESULTS      CT renal stone study 9/18/2022:  Kidneys and  ureters:  Unremarkable.   Bladder: Unremarkable.       ASSESSMENT/PLAN     Chronic Kidney Disease Stage 3:  Previously followed with [facility] in March of 2023 and did not follow-up. After review of the medical record, it appears baseline creatinine level fluctuates around 1.2 - 1.4. Current creatinine level is 1.39 with an eGFR of 38. Etiology of CKD likely multifactorial in the setting of diabetic nephropathy and hypertensive nephrosclerosis.  There also may be a component of fluctuating creatinine levels based on hydration as patient reports decreased water intake.    Most recent urine microalbumin to creatinine ratio < 22. Prior urinalysis in January of 2024 showed no significant hematuria. CT renal stone study in September of 2022 showed no hydronephrosis or urinary retention.     Advised she stay hydrated with minimum of 48 ounces daily and to continue to avoid nephrotoxic agent such as NSAIDs.  Will obtain updated CKD labs prior to follow-up.    Hypertension:  Currently maintained on carvedilol 3.125 mg BID and furosemide 40 mg daily. Blood pressure within acceptable range on the current regimen.    Secondary Hyperparathyroidism of renal origin:  .3, Calcium 9.3, phos 4.0, vitamin D 62.1. Currently maintained on cholecalciferol. Will continue to monitor and repeat labs prior to follow-up.     Type 2 Diabetes:  Following with PCP for management, prior endocrinologist retired. Currently maintained on insulin, Jardiance, Glimepiride, and Metformin. Most recent hemoglobin A1C elevated at 9.6. Discussed importance of good glycemic control in the setting of underlying CKD and recommend goal A1C of 7.0 or less. She is scheduled to follow-up with endocrinology.        Thank you for the consultation to assist in the care of Iman Walden. We will continue to follow the patient with you. Recommend nephrology follow-up in 6 months and will repeat CKD labs prior to appointment.     Lyla Bradley,  "[facility]  Nephrology  2/28/2024      Portions of the record may have been created with voice recognition software. Occasional wrong word or \"sound a like\" substitutions may have occurred due to the inherent limitations of voice recognition software. Read the chart carefully and recognize, using context, where substitutions have occurred.   "

## 2024-02-27 ENCOUNTER — TELEPHONE (OUTPATIENT)
Dept: NEPHROLOGY | Facility: CLINIC | Age: 74
End: 2024-02-27

## 2024-02-27 ENCOUNTER — TELEPHONE (OUTPATIENT)
Dept: FAMILY MEDICINE CLINIC | Facility: CLINIC | Age: 74
End: 2024-02-27

## 2024-02-27 DIAGNOSIS — E11.9 DIABETES MELLITUS TYPE 2 IN NONOBESE (HCC): ICD-10-CM

## 2024-02-27 DIAGNOSIS — D64.9 ANEMIA, UNSPECIFIED TYPE: Primary | ICD-10-CM

## 2024-02-27 RX ORDER — INSULIN GLARGINE 100 [IU]/ML
30 INJECTION, SOLUTION SUBCUTANEOUS
Start: 2024-02-27 | End: 2024-03-28

## 2024-02-27 RX ORDER — INSULIN LISPRO 100 [IU]/ML
8 INJECTION, SOLUTION INTRAVENOUS; SUBCUTANEOUS
Qty: 15 ML | Refills: 1 | Status: SHIPPED | OUTPATIENT
Start: 2024-02-27 | End: 2024-02-28

## 2024-02-27 NOTE — TELEPHONE ENCOUNTER
T/c from pt -- went for bloodwork as directed by Dr Lorenzo at her recent appt.  Is requesting Dr Lorenzo review and provide advisement.  Is concerned as she saw results on mychart, but is unsure what they mean.

## 2024-02-27 NOTE — TELEPHONE ENCOUNTER
T/c from pt  to check on status of blood work results --    Gave pt Dr Lorenzo' advisement.  Pt had received call from hematology/oncology before call from this office and was very concerned. Did schedule with hematology for 5/8.    Pt reports she was told she may need infusions previously, but her GI doctor told her that she didn't need them, as the problem was her liver.    Also reports she will increase her nightly insulin, but is not starting a meal time insulin as well.

## 2024-02-27 NOTE — TELEPHONE ENCOUNTER
Glucose remains poorly controlled.  Need to adjust insulin- increase glargine from 20 units nightly to 30 units nightly. Will also recommend we start meal time insulin and will send that in- 8 units with meals. Keep track of home glucose readings and will have follow up with endocrine next month      Also with worsening anemia. Given this will recommend bren see a hematologist and to get an iron panel (lab) before next recheck as she may require IB iron infusions

## 2024-02-28 ENCOUNTER — CONSULT (OUTPATIENT)
Dept: NEPHROLOGY | Facility: CLINIC | Age: 74
End: 2024-02-28
Payer: COMMERCIAL

## 2024-02-28 VITALS
DIASTOLIC BLOOD PRESSURE: 74 MMHG | HEART RATE: 92 BPM | OXYGEN SATURATION: 96 % | SYSTOLIC BLOOD PRESSURE: 122 MMHG | RESPIRATION RATE: 18 BRPM | HEIGHT: 59 IN | BODY MASS INDEX: 28.99 KG/M2 | WEIGHT: 143.8 LBS | TEMPERATURE: 97.6 F

## 2024-02-28 DIAGNOSIS — E11.22 HYPERTENSION ASSOCIATED WITH STAGE 3B CHRONIC KIDNEY DISEASE DUE TO TYPE 2 DIABETES MELLITUS (HCC): ICD-10-CM

## 2024-02-28 DIAGNOSIS — I12.9 HYPERTENSION ASSOCIATED WITH STAGE 3B CHRONIC KIDNEY DISEASE DUE TO TYPE 2 DIABETES MELLITUS (HCC): ICD-10-CM

## 2024-02-28 DIAGNOSIS — N18.30 STAGE 3 CHRONIC KIDNEY DISEASE, UNSPECIFIED WHETHER STAGE 3A OR 3B CKD (HCC): Primary | ICD-10-CM

## 2024-02-28 DIAGNOSIS — N18.32 HYPERTENSION ASSOCIATED WITH STAGE 3B CHRONIC KIDNEY DISEASE DUE TO TYPE 2 DIABETES MELLITUS (HCC): ICD-10-CM

## 2024-02-28 DIAGNOSIS — E11.9 DIABETES MELLITUS TYPE 2 IN NONOBESE (HCC): ICD-10-CM

## 2024-02-28 PROBLEM — I15.1 HYPERTENSION SECONDARY TO OTHER RENAL DISORDERS: Status: ACTIVE | Noted: 2024-02-28

## 2024-02-28 PROCEDURE — 99204 OFFICE O/P NEW MOD 45 MIN: CPT

## 2024-02-29 ENCOUNTER — TELEPHONE (OUTPATIENT)
Dept: FAMILY MEDICINE CLINIC | Facility: CLINIC | Age: 74
End: 2024-02-29

## 2024-02-29 DIAGNOSIS — E11.9 DIABETES MELLITUS TYPE 2 IN NONOBESE (HCC): Primary | ICD-10-CM

## 2024-02-29 RX ORDER — BLOOD-GLUCOSE SENSOR
1 EACH MISCELLANEOUS
Qty: 6 EACH | Refills: 3 | Status: SHIPPED | OUTPATIENT
Start: 2024-02-29

## 2024-02-29 RX ORDER — INSULIN LISPRO 100 [IU]/ML
8 INJECTION, SOLUTION INTRAVENOUS; SUBCUTANEOUS
Qty: 15 ML | Refills: 1 | Status: SHIPPED | OUTPATIENT
Start: 2024-02-29

## 2024-02-29 RX ORDER — BLOOD-GLUCOSE,RECEIVER,CONT
1 EACH MISCELLANEOUS ONCE
Qty: 1 EACH | Refills: 0 | Status: SHIPPED | OUTPATIENT
Start: 2024-02-29 | End: 2024-02-29

## 2024-02-29 NOTE — TELEPHONE ENCOUNTER
As a follow-up, a second attempt has been made for outreach via fax to facility. Please see Contacts section for details.    Thank you  Chencho Foy MA

## 2024-03-02 DIAGNOSIS — K70.30 ALCOHOLIC CIRRHOSIS OF LIVER WITHOUT ASCITES (HCC): ICD-10-CM

## 2024-03-02 DIAGNOSIS — F10.11 HISTORY OF ALCOHOL ABUSE: ICD-10-CM

## 2024-03-04 ENCOUNTER — TELEPHONE (OUTPATIENT)
Dept: FAMILY MEDICINE CLINIC | Facility: CLINIC | Age: 74
End: 2024-03-04

## 2024-03-04 DIAGNOSIS — D64.9 ANEMIA, UNSPECIFIED TYPE: Primary | ICD-10-CM

## 2024-03-04 RX ORDER — PANTOPRAZOLE SODIUM 40 MG/1
40 TABLET, DELAYED RELEASE ORAL DAILY
Qty: 90 TABLET | Refills: 1 | Status: SHIPPED | OUTPATIENT
Start: 2024-03-04

## 2024-03-04 NOTE — TELEPHONE ENCOUNTER
Fax arrived from [facility]  - pt has requested a refill of attached meds (ferrous sulfate 325 (65 Fe) mg tablet )      Please advise

## 2024-03-05 RX ORDER — FERROUS SULFATE 325(65) MG
1 TABLET ORAL EVERY OTHER DAY
Qty: 90 TABLET | Refills: 1 | Status: SHIPPED | OUTPATIENT
Start: 2024-03-05

## 2024-03-06 NOTE — TELEPHONE ENCOUNTER
As a final attempt, a third outreach has been made via telephone call to facility. Please see Contacts section for details. This encounter will be closed and completed by end of day. Should we receive the requested information because of previous outreach attempts, the requested patient's chart will be updated appropriately.     Thank you  Chencho Foy MA

## 2024-03-07 ENCOUNTER — TELEPHONE (OUTPATIENT)
Dept: FAMILY MEDICINE CLINIC | Facility: CLINIC | Age: 74
End: 2024-03-07

## 2024-03-07 NOTE — TELEPHONE ENCOUNTER
T/c from pt calling in her BS levels for the week    BS levels --   3/2 3:30 - 224  3/3 -3:30 - 216  3/4 3:15 - 249  3/5 -3:45 -185  3/6 -3:45 -129  3/7 - 3:30 -188      Please advise

## 2024-03-11 ENCOUNTER — TELEPHONE (OUTPATIENT)
Dept: NEPHROLOGY | Facility: CLINIC | Age: 74
End: 2024-03-11

## 2024-03-11 NOTE — TELEPHONE ENCOUNTER
Called left voice message to advised patient to call office to reschedule follow up appointment with Lyla Bradley([facility]), from 08/28/24 @ 4:00pm due to provider schedule change.    Called spoke with patient's spouse yadiel advised patient appointment scheduled for 08/28/24 @ 4:00pm with Lyla Bradley([facility]), has been moved to 08/30/24 @ 11:30am due to provider schedule being changed from evening to morning. yadiel verbalized understanding and is okay with it.

## 2024-03-27 ENCOUNTER — OFFICE VISIT (OUTPATIENT)
Age: 74
End: 2024-03-27
Payer: COMMERCIAL

## 2024-03-27 VITALS
BODY MASS INDEX: 28.83 KG/M2 | HEIGHT: 59 IN | WEIGHT: 143 LBS | DIASTOLIC BLOOD PRESSURE: 74 MMHG | SYSTOLIC BLOOD PRESSURE: 130 MMHG

## 2024-03-27 DIAGNOSIS — I12.9 HYPERTENSION ASSOCIATED WITH STAGE 3B CHRONIC KIDNEY DISEASE DUE TO TYPE 2 DIABETES MELLITUS (HCC): ICD-10-CM

## 2024-03-27 DIAGNOSIS — K70.30 ALCOHOLIC CIRRHOSIS OF LIVER WITHOUT ASCITES (HCC): ICD-10-CM

## 2024-03-27 DIAGNOSIS — N18.32 HYPERTENSION ASSOCIATED WITH STAGE 3B CHRONIC KIDNEY DISEASE DUE TO TYPE 2 DIABETES MELLITUS (HCC): ICD-10-CM

## 2024-03-27 DIAGNOSIS — E11.9 DIABETES MELLITUS TYPE 2 IN NONOBESE (HCC): Primary | ICD-10-CM

## 2024-03-27 DIAGNOSIS — E11.42 DIABETIC PERIPHERAL NEUROPATHY (HCC): ICD-10-CM

## 2024-03-27 DIAGNOSIS — N18.30 STAGE 3 CHRONIC KIDNEY DISEASE, UNSPECIFIED WHETHER STAGE 3A OR 3B CKD (HCC): ICD-10-CM

## 2024-03-27 DIAGNOSIS — E11.22 HYPERTENSION ASSOCIATED WITH STAGE 3B CHRONIC KIDNEY DISEASE DUE TO TYPE 2 DIABETES MELLITUS (HCC): ICD-10-CM

## 2024-03-27 PROCEDURE — 99214 OFFICE O/P EST MOD 30 MIN: CPT | Performed by: NURSE PRACTITIONER

## 2024-03-27 NOTE — ASSESSMENT & PLAN NOTE
/74. Continue carvedilol 3.125 mg twice daily  Lab Results   Component Value Date    HGBA1C 9.6 (H) 02/21/2024

## 2024-03-27 NOTE — ASSESSMENT & PLAN NOTE
Counseled on relationship of uncontrolled hyperglycemia and worsening neuropathy.  Taking 600 mg of gabapentin 3 times daily.  Lab Results   Component Value Date    HGBA1C 9.6 (H) 02/21/2024

## 2024-03-27 NOTE — ASSESSMENT & PLAN NOTE
Lab Results   Component Value Date    EGFR 38 02/21/2024    EGFR 35 01/03/2024    EGFR 40 08/09/2023    CREATININE 1.39 (H) 02/21/2024    CREATININE 1.48 (H) 01/03/2024    CREATININE 1.33 (H) 08/09/2023   Follow-up with nephrology.  Counseled on deleterious effects of persistent hyperglycemia on renal function.

## 2024-03-27 NOTE — ASSESSMENT & PLAN NOTE
Patient is uncontrolled with hyperglycemia. Counseled on pathophysiology of diabetes. Counseled on negative, long-term effects associated with uncontrolled diabetes including neuropathy, nephropathy, retinopathy, heart attack, and stroke.  At this time, patient refuses any change in therapies including increasing basal insulin, starting mealtime insulin, or starting GLP-1, with caution given hx of bleeding esophageal varices and risk of increased n/v. She is willing to eat less Rice Krispies and to try a whole grain cereal instead. Unwilling to use CGM over concerns of needles getting stuck in her arm, which I explained is highly unlikely, or testing her blood sugars with finger sticks. Offered MNT with registered dietician. Declines. Check C peptide with next labs. F/U in 4 months.   Lab Results   Component Value Date    HGBA1C 9.6 (H) 02/21/2024

## 2024-03-27 NOTE — PROGRESS NOTES
Established Patient Progress Note    Chief Complaint:  Diabetes follow up visit    Impression & Plan:    Problem List Items Addressed This Visit       Alcoholic cirrhosis of liver without ascites (HCC)     Abstaining from alcohol.          Diabetes mellitus type 2 in nonobese (HCC) - Primary     Patient is uncontrolled with hyperglycemia. Counseled on pathophysiology of diabetes. Counseled on negative, long-term effects associated with uncontrolled diabetes including neuropathy, nephropathy, retinopathy, heart attack, and stroke.  At this time, patient refuses any change in therapies including increasing basal insulin, starting mealtime insulin, or starting GLP-1, with caution given hx of bleeding esophageal varices and risk of increased n/v. She is willing to eat less Rice Krispies and to try a whole grain cereal instead. Unwilling to use CGM over concerns of needles getting stuck in her arm, which I explained is highly unlikely, or testing her blood sugars with finger sticks. Offered MNT with registered dietician. Declines. Check C peptide with next labs. F/U in 4 months.   Lab Results   Component Value Date    HGBA1C 9.6 (H) 02/21/2024            Relevant Orders    Hemoglobin A1C    Albumin / creatinine urine ratio    Basic metabolic panel    C-peptide    Diabetic peripheral neuropathy ([facility])     Counseled on relationship of uncontrolled hyperglycemia and worsening neuropathy.  Taking 600 mg of gabapentin 3 times daily.  Lab Results   Component Value Date    HGBA1C 9.6 (H) 02/21/2024            Stage 3 chronic kidney disease, unspecified whether stage 3a or 3b CKD ([facility])     Lab Results   Component Value Date    EGFR 38 02/21/2024    EGFR 35 01/03/2024    EGFR 40 08/09/2023    CREATININE 1.39 (H) 02/21/2024    CREATININE 1.48 (H) 01/03/2024    CREATININE 1.33 (H) 08/09/2023   Follow-up with nephrology.  Counseled on deleterious effects of persistent hyperglycemia on renal function.         Hypertension  associated with stage 3b chronic kidney disease due to type 2 diabetes mellitus (HCC)     /74. Continue carvedilol 3.125 mg twice daily  Lab Results   Component Value Date    HGBA1C 9.6 (H) 02/21/2024               History of Present Illness:   Iman Walden is a 73 y.o. female with uncontrolled type 2 diabetes presenting to the office today for routine follow-up.    Patient admits that her diet is poor.  She is not checking blood sugars.  She will not wear a continuous glucose monitor.     Current regimen:  Glimepiride 4 mg twice daily before meals  Lantus 30 units nightly  Jardiance 25 mg daily  Metformin 500 mg 3 times daily before meals (1 tablet in the morning and two tablets at night)    I have spent a total time of 40 minutes on 03/27/24 in caring for this patient including Diagnostic results, Prognosis, Risks and benefits of tx options, Instructions for management, Patient and family education, Importance of tx compliance, Risk factor reductions, and Obtaining or reviewing history  .        Patient Active Problem List   Diagnosis    Compression fracture of lumbar vertebra (HCC)    Alcoholic cirrhosis of liver without ascites (HCC)    Diabetes mellitus type 2 in nonobese (HCC)    Secondary esophageal varices with bleeding (HCC)    Continuous opioid dependence (HCC)    Diabetic peripheral neuropathy (HCC)    Stage 3 chronic kidney disease, unspecified whether stage 3a or 3b CKD (HCC)    Hypertension secondary to other renal disorders    Hypertension associated with stage 3b chronic kidney disease due to type 2 diabetes mellitus (HCC)      Past Medical History:   Diagnosis Date    Acute blood loss anemia 02/20/2021    Acute embolism and thrombosis of other specified deep vein of right lower extremity (HCC) 11/16/2022    ALC (alcoholic liver cirrhosis) (HCC)     Chronic kidney disease     Colon polyp     Diabetes mellitus (HCC)     Edema 02/23/2021    HLD (hyperlipidemia)      Hypertension     Neuropathy     Neuropathy 02/20/2021    Pleural effusion 02/20/2021    Seasonal allergies     Thrombocytopenia (HCC)       Past Surgical History:   Procedure Laterality Date    BREAST BIOPSY      CHOLECYSTECTOMY      NASAL SEPTUM SURGERY        Family History   Problem Relation Age of Onset    Emphysema Mother     Heart attack Father     Heart disease Father      Social History     Tobacco Use    Smoking status: Never     Passive exposure: Never    Smokeless tobacco: Never   Substance Use Topics    Alcohol use: Never     Allergies   Allergen Reactions    Crab (Diagnostic) - Food Allergy Anaphylaxis    Bee Pollen Other (See Comments)     Other reaction(s): Other (See Comments)    Pollen Extract Other (See Comments)         Current Outpatient Medications:     atorvastatin (LIPITOR) 20 mg tablet, Take 1 tablet (20 mg total) by mouth daily, Disp: 90 tablet, Rfl: 3    Blood Glucose Monitoring Suppl ([employer] Blood Glucose Meter) w/Device KIT, OneTouch Ultra. Use TID. E11.65, Disp: , Rfl:     CALCIUM PO, Take by mouth, Disp: , Rfl:     carvedilol (COREG) 3.125 mg tablet, TAKE 1 TABLET TWICE A DAY WITH MEALS, Disp: 180 tablet, Rfl: 3    ferrous sulfate 325 (65 Fe) mg tablet, Take 1 tablet (325 mg total) by mouth every other day, Disp: 90 tablet, Rfl: 1    furosemide (LASIX) 40 mg tablet, TAKE 1 TABLET DAILY, Disp: 90 tablet, Rfl: 3    gabapentin (NEURONTIN) 300 mg capsule, Take 2 capsules (600 mg total) by mouth 3 (three) times a day, Disp: 540 capsule, Rfl: 1    glimepiride (AMARYL) 4 mg tablet, TAKE 1 TABLET TWICE A DAY, Disp: 120 tablet, Rfl: 5    Insulin Glargine Solostar (Lantus SoloStar) 100 UNIT/ML SOPN, Inject 0.3 mL (30 Units total) under the skin daily at bedtime, Disp: , Rfl:     Jardiance 25 MG TABS, TAKE 1 TABLET DAILY, Disp: 90 tablet, Rfl: 3    Lancets (OneTouch Delica Plus [id number]) MISC, USE THREE TIMES A DAY, Disp: 300 each, Rfl: 2    loratadine (CLARITIN) 10 mg tablet, TAKE 1 TABLET BY MOUTH  EVERY DAY, Disp: 90 tablet, Rfl: 1    metFORMIN (GLUCOPHAGE) 500 mg tablet, Take 1 tablet (500 mg total) by mouth 3 (three) times a day, Disp: 270 tablet, Rfl: 2    MULTIPLE VITAMIN PO, Take 1 tablet by mouth, Disp: , Rfl:     pantoprazole (PROTONIX) 40 mg tablet, TAKE 1 TABLET BY MOUTH EVERY DAY, Disp: 90 tablet, Rfl: 1    sodium chloride (EAMON 128) 5 % hypertonic ophthalmic solution, Administer 1 drop to both eyes every evening, Disp: , Rfl:     traMADol (ULTRAM) 50 mg tablet, Take 1 tablet (50 mg total) by mouth daily as needed for severe pain, Disp: 10 tablet, Rfl: 0    Continuous Blood Gluc Sensor (FreeStyle Macy 3 Sensor) MISC, Use 1 each every 14 (fourteen) days, Disp: 6 each, Rfl: 3    Continuous Blood Gluc Transmit (Dexcom G6 Transmitter) MISC, Change every 3 months. E11.65, Disp: , Rfl:     Review of Systems   Constitutional:  Positive for fatigue. Negative for activity change, appetite change and unexpected weight change.   HENT:  Negative for dental problem, sore throat, trouble swallowing and voice change.    Eyes:  Negative for visual disturbance.   Respiratory:  Negative for cough, chest tightness and shortness of breath.    Cardiovascular:  Negative for chest pain, palpitations and leg swelling.   Gastrointestinal:  Positive for nausea. Negative for constipation, diarrhea and vomiting.   Endocrine: Negative for polydipsia, polyphagia and polyuria.   Genitourinary:  Negative for frequency.   Musculoskeletal:  Positive for arthralgias, gait problem and myalgias. Negative for back pain.   Skin:  Negative for wound.   Allergic/Immunologic: Negative for environmental allergies and food allergies.   Neurological:  Positive for numbness. Negative for dizziness, weakness, light-headedness and headaches.   Psychiatric/Behavioral:  Positive for decreased concentration. Negative for dysphoric mood and sleep disturbance. The patient is not nervous/anxious.        Physical Exam:  Body mass index is 29.38  "kg/m².  /74 (BP Location: Left arm, Patient Position: Sitting, Cuff Size: Standard)   Ht 4' 10.5\" (1.486 m)   Wt 64.9 kg (143 lb)   BMI 29.38 kg/m²    Wt Readings from Last 3 Encounters:   03/27/24 64.9 kg (143 lb)   02/28/24 65.2 kg (143 lb 12.8 oz)   02/21/24 64.9 kg (143 lb)       Physical Exam  Vitals reviewed.   Constitutional:       General: She is not in acute distress.     Appearance: She is well-developed. She is not ill-appearing.   HENT:      Head: Normocephalic and atraumatic.   Eyes:      Pupils: Pupils are equal, round, and reactive to light.   Neck:      Thyroid: No thyromegaly.   Cardiovascular:      Rate and Rhythm: Normal rate.   Pulmonary:      Effort: Pulmonary effort is normal.   Musculoskeletal:      Cervical back: Normal range of motion and neck supple.      Right lower leg: No edema.      Left lower leg: No edema.   Lymphadenopathy:      Cervical: No cervical adenopathy.   Skin:     General: Skin is warm and dry.      Capillary Refill: Capillary refill takes less than 2 seconds.   Neurological:      Mental Status: She is alert and oriented to person, place, and time.      Gait: Gait normal.   Psychiatric:         Mood and Affect: Mood normal.         Behavior: Behavior normal.      Comments: Poor insight into diagnosis.           Labs:   Lab Results   Component Value Date    HGBA1C 9.6 (H) 02/21/2024    HGBA1C  02/21/2024      Comment:      code 103    HGBA1C 9.3 (A) 11/22/2023     Lab Results   Component Value Date    CREATININE 1.39 (H) 02/21/2024    CREATININE 1.48 (H) 01/03/2024    CREATININE 1.33 (H) 08/09/2023    BUN 30 (H) 02/21/2024    K 4.4 02/21/2024     02/21/2024    CO2 26 02/21/2024     eGFRcr   Date Value Ref Range Status   02/21/2023 52 (L) >59 Final     eGFR   Date Value Ref Range Status   02/21/2024 38 ml/min/1.73sq m Final     No results found for: \"CHOL\", \"HDL\", \"LDL\", \"TRIG\", \"CHOLHDL\"  Lab Results   Component Value Date    ALT 15 02/21/2024    AST 16 " "02/21/2024    ALKPHOS 58 02/21/2024     Lab Results   Component Value Date    MJR0NVKBEUPI 1.929 08/09/2023     No results found for: \"FREET4\", \"TSI\"    Discussed with the patient and all questioned fully answered. She will call me if any problems arise.    Follow-up appointment in 4 months.     Counseled patient on diagnostic results, prognosis, risk and benefit of treatment options, instruction for management, importance of treatment compliance, Risk  factor reduction and impressions    KRYSTLE Maria    "

## 2024-04-12 DIAGNOSIS — E11.9 DIABETES MELLITUS TYPE 2 IN NONOBESE (HCC): ICD-10-CM

## 2024-04-19 ENCOUNTER — TELEPHONE (OUTPATIENT)
Age: 74
End: 2024-04-19

## 2024-04-19 NOTE — TELEPHONE ENCOUNTER
Patient was calling to confirm the date for her lab draw. I verified this with the patient. No further questions at this time.

## 2024-04-24 ENCOUNTER — APPOINTMENT (OUTPATIENT)
Age: 74
End: 2024-04-24
Payer: COMMERCIAL

## 2024-04-24 DIAGNOSIS — E11.9 DIABETES MELLITUS TYPE 2 IN NONOBESE (HCC): ICD-10-CM

## 2024-04-24 LAB
CREAT UR-MCNC: 51.5 MG/DL
MICROALBUMIN UR-MCNC: 10 MG/L
MICROALBUMIN/CREAT 24H UR: 19 MG/G CREATININE (ref 0–30)

## 2024-04-24 PROCEDURE — 82043 UR ALBUMIN QUANTITATIVE: CPT

## 2024-04-24 PROCEDURE — 84681 ASSAY OF C-PEPTIDE: CPT

## 2024-04-24 PROCEDURE — 82570 ASSAY OF URINE CREATININE: CPT

## 2024-04-24 PROCEDURE — 36415 COLL VENOUS BLD VENIPUNCTURE: CPT

## 2024-04-25 LAB — C PEPTIDE SERPL-MCNC: 4.5 NG/ML (ref 1.1–4.4)

## 2024-05-01 ENCOUNTER — VBI (OUTPATIENT)
Dept: ADMINISTRATIVE | Facility: OTHER | Age: 74
End: 2024-05-01

## 2024-05-08 ENCOUNTER — CONSULT (OUTPATIENT)
Dept: HEMATOLOGY ONCOLOGY | Facility: CLINIC | Age: 74
End: 2024-05-08
Payer: COMMERCIAL

## 2024-05-08 VITALS
SYSTOLIC BLOOD PRESSURE: 112 MMHG | HEIGHT: 58 IN | OXYGEN SATURATION: 98 % | WEIGHT: 140 LBS | BODY MASS INDEX: 29.39 KG/M2 | RESPIRATION RATE: 18 BRPM | HEART RATE: 78 BPM | DIASTOLIC BLOOD PRESSURE: 62 MMHG | TEMPERATURE: 98.3 F

## 2024-05-08 DIAGNOSIS — D72.10 EOSINOPHILIA, UNSPECIFIED TYPE: ICD-10-CM

## 2024-05-08 DIAGNOSIS — N18.32 CKD STAGE 3B, GFR 30-44 ML/MIN (HCC): Primary | ICD-10-CM

## 2024-05-08 DIAGNOSIS — D64.9 ANEMIA, UNSPECIFIED TYPE: ICD-10-CM

## 2024-05-08 PROCEDURE — 99204 OFFICE O/P NEW MOD 45 MIN: CPT | Performed by: PHYSICIAN ASSISTANT

## 2024-05-08 NOTE — PROGRESS NOTES
[facility] [address] [address]  Hematology Ambulatory Consult  Iman Walden, 1950, [id number]  5/8/2024      Assessment and Plan   1. Anemia, unspecified type  - Ambulatory Referral to Hematology / Oncology  - Iron Panel (Includes Ferritin, Iron Sat%, Iron, and TIBC); Future  - Haptoglobin; Future  - LD,Blood; Future  - Retic Count; Future  - Methylmalonic acid, serum; Future  - Occult Blood, Fecal Immunochemical; Future  - CBC and differential; Future  - Comprehensive metabolic panel; Future  - Erythropoietin; Future    2. CKD stage 3b, GFR 30-44 ml/min ([facility])  3. Eosinophilia     In summary this is a 73-year-old female with history of CKD, cirrhosis who is seen in consultation today for microcytic anemia.  She does have history of anemia from bleeding esophageal varices in 2021. Hgb normalized in 2022 and anemia reoccurred in 2023 and hgb has been steadily declining since. hemoglobin of 7.3 hemoglobin of 7.3, labs consistent with a microcytic anemia.  WBC and platelet count are normal however differential does show eosinophilia with absolute significant count of 1.02.  There is no recent iron panel available for review. She denies any bleeding or constitutional symptoms.  She does appear to be having some symptoms related to anemia including cold intolerance, fatigue, shortness of breath and easy bruising.     Discussion/plan  I reviewed the possible etiologies of her anemia which include occult bleeding/iron deficiency, anemia due to chronic kidney and/or liver disease, and less likely bone marrow disorder.  Recommend additional labs as above to further evaluate for iron deficiency, hemolysis, bone marrow dyscrasia etc.  I strongly encouraged the patient to have her labs done today as her hemoglobin from February was very low and on the borderline of needing transfusion however she states she is unable  to go today.  She is agreeable to have her labs done tomorrow.  If significant eosinophilia still present on repeat CBC/peripheral smear, would recommend flow cytometry  If iron deficiency anemia is present, she will need referral back to gastroenterology for further evaluation  Will consider PRBC transfusion for hemoglobin less than 7 g/dL.  RTC 3 weeks or sooner pending above blood work    Patient voiced agreement and understanding to the above.   Patient knows to call the Hematology/Oncology office with any questions and concerns regarding the above.    Barrier(s) to care: None.    Michelle Velazquez PA-C  Medical Oncology/Hematology  [facility]    Subjective   No chief complaint on file.      Referring provider    Kelvin Lorenzo MD  [address]  [address]  [place]  [place] [address]    History of present illness: In summary this is a 73-year-old female with past medical history of alcohol use, hepatitis C, cirrhosis, CKD stage III, type 2 diabetes complicated by peripheral neuropathy and hypertension who has been referred by her PCP for evaluation and management of anemia.    Patient reports she has been anemic for many years. There are limited data points in EMR available for review.  Does appear that the patient has had anemia since at least 2021.  Around this time she was hospitalized for fall with subsequent compression fracture.  She was found to have cirrhosis during admission and had a upper GI bleed due to bleeding varices.  She does report history of alcohol use and hepatitis C.  She received her only blood transfusion of lites during this hospitalization.  Follow-up labs in 2022 showed normal hemoglobin in the 11-12 range   Until April 2023 where she developed recurrent anemia.  Hemoglobin at this time was around 9 g/dL.  Her most recent labs from February 2024 show worsening anemia with hemoglobin of 7.3 hemoglobin of 7.3, labs consistent with a microcytic anemia.  WBC and  platelet count are normal however differential does show eosinophilia with absolute significant count of 1.02. There is no recent iron panel available for review.  Last ferritin level was low in 2020.  Recent B12 level 761.    She denies history of malabsorption condition, hematochezia, melena, hematuria or vaginal bleeding.  She is on oral iron every other day and has been on this regimen for several months.  No history of IV iron.  She is on PPI chronically.    Rare alcohol use. No tobacco use. She is on medical marijuana vape. No other drug use.      No history of thyroid cancer. No pernasal history of cancer   Mothers aunt had breast cancer, Maternal grandmother skin cancer, maternal grandfather unknown CA type     Last EGD 09/2022: Nonbleeding small esophageal varices. Multiple inflamed gastric polyps possibly the cause of hematemesis. Duodenal polyp possibly Brunner's gland.  Pathology showed hyperplastic polyp.  Last colonoscopy 05/2023: polyps     02/2024: WBC 7.2, hemoglobin 7.3, MCV 72, RDW 17, platelets 209,000.  Creatinine 1.39, EGFR 38.    Review of Systems   Constitutional:  Positive for chills (feels cold all the time) and fatigue (sometimes). Negative for fever and unexpected weight change (reports intentional weight loss, reduced caloric intake to try and help diabetes).   Eyes:  Negative for visual disturbance.   Respiratory:  Positive for shortness of breath (+sometimes with exertion).    Cardiovascular:  Negative for chest pain.   Gastrointestinal:  Negative for abdominal pain, blood in stool, nausea and vomiting.        No melena     Genitourinary:  Negative for hematuria and vaginal bleeding.   Skin:  Negative for rash.   Neurological:  Negative for headaches.   Hematological:  Negative for adenopathy. Bruises/bleeds easily (on occasion).   All other systems reviewed and are negative.      Past Medical History:   Diagnosis Date    Acute blood loss anemia 02/20/2021    Acute embolism and  thrombosis of other specified deep vein of right lower extremity (HCC) 11/16/2022    ALC (alcoholic liver cirrhosis) (HCC)     Chronic kidney disease     Colon polyp     Diabetes mellitus (HCC)     Edema 02/23/2021    HLD (hyperlipidemia)     Hypertension     Neuropathy     Neuropathy 02/20/2021    Pleural effusion 02/20/2021    Seasonal allergies     Thrombocytopenia (HCC)      Past Surgical History:   Procedure Laterality Date    BREAST BIOPSY      CHOLECYSTECTOMY      NASAL SEPTUM SURGERY       Family History   Problem Relation Age of Onset    Emphysema Mother     Heart attack Father     Heart disease Father      Social History     Socioeconomic History    Marital status: /Civil Union     Spouse name: None    Number of children: None    Years of education: None    Highest education level: None   Occupational History    None   Tobacco Use    Smoking status: Never     Passive exposure: Never    Smokeless tobacco: Never   Vaping Use    Vaping status: Never Used   Substance and Sexual Activity    Alcohol use: Never    Drug use: Yes     Types: Marijuana     Comment:  unknown    Sexual activity: Yes     Partners: Male   Other Topics Concern    None   Social History Narrative    None     Social Determinants of Health     Financial Resource Strain: Low Risk  (11/22/2023)    Overall Financial Resource Strain (CARDIA)     Difficulty of Paying Living Expenses: Not hard at all   Food Insecurity: Not on file   Transportation Needs: No Transportation Needs (11/22/2023)    PRAPARE - Transportation     Lack of Transportation (Medical): No     Lack of Transportation (Non-Medical): No   Physical Activity: Not on file   Stress: Not on file   Social Connections: Not on file   Intimate Partner Violence: Not on file   Housing Stability: Not on file         Current Outpatient Medications:     atorvastatin (LIPITOR) 20 mg tablet, Take 1 tablet (20 mg total) by mouth daily, Disp: 90 tablet, Rfl: 3    Blood Glucose Monitoring Suppl  ([employer] Blood Glucose Meter) w/Device KIT, OneTouch Ultra. Use TID. E11.65, Disp: , Rfl:     CALCIUM PO, Take by mouth, Disp: , Rfl:     carvedilol (COREG) 3.125 mg tablet, TAKE 1 TABLET TWICE A DAY WITH MEALS, Disp: 180 tablet, Rfl: 3    Continuous Blood Gluc Sensor (FreeStyle Macy 3 Sensor) MISC, Use 1 each every 14 (fourteen) days, Disp: 6 each, Rfl: 3    Continuous Blood Gluc Transmit (Dexcom G6 Transmitter) MISC, Change every 3 months. E11.65, Disp: , Rfl:     ferrous sulfate 325 (65 Fe) mg tablet, Take 1 tablet (325 mg total) by mouth every other day, Disp: 90 tablet, Rfl: 1    furosemide (LASIX) 40 mg tablet, TAKE 1 TABLET DAILY, Disp: 90 tablet, Rfl: 3    glimepiride (AMARYL) 4 mg tablet, TAKE 1 TABLET TWICE A DAY, Disp: 120 tablet, Rfl: 5    Jardiance 25 MG TABS, TAKE 1 TABLET DAILY, Disp: 90 tablet, Rfl: 3    Lancets (OneTouch Delica Plus [id number]) MISC, USE THREE TIMES A DAY, Disp: 300 each, Rfl: 2    loratadine (CLARITIN) 10 mg tablet, TAKE 1 TABLET BY MOUTH EVERY DAY, Disp: 90 tablet, Rfl: 1    metFORMIN (GLUCOPHAGE) 500 mg tablet, TAKE 1 TABLET THREE TIMES A DAY, Disp: 270 tablet, Rfl: 1    MULTIPLE VITAMIN PO, Take 1 tablet by mouth, Disp: , Rfl:     pantoprazole (PROTONIX) 40 mg tablet, TAKE 1 TABLET BY MOUTH EVERY DAY, Disp: 90 tablet, Rfl: 1    sodium chloride (EAMON 128) 5 % hypertonic ophthalmic solution, Administer 1 drop to both eyes every evening, Disp: , Rfl:     traMADol (ULTRAM) 50 mg tablet, Take 1 tablet (50 mg total) by mouth daily as needed for severe pain, Disp: 10 tablet, Rfl: 0    gabapentin (NEURONTIN) 300 mg capsule, Take 2 capsules (600 mg total) by mouth 3 (three) times a day, Disp: 540 capsule, Rfl: 1    Insulin Glargine Solostar (Lantus SoloStar) 100 UNIT/ML SOPN, Inject 0.3 mL (30 Units total) under the skin daily at bedtime, Disp: , Rfl:   Allergies   Allergen Reactions    Crab (Diagnostic) - Food Allergy Anaphylaxis    Bee Pollen Other (See Comments)     Other reaction(s):  "Other (See Comments)    Pollen Extract Other (See Comments)       Objective   /62 (BP Location: Left arm, Patient Position: Sitting, Cuff Size: Adult)   Pulse 78   Temp 98.3 °F (36.8 °C) (Temporal)   Resp 18   Ht 4' 10\" (1.473 m)   Wt 63.5 kg (140 lb)   SpO2 98%   BMI 29.26 kg/m²   Physical Exam  Vitals reviewed.   HENT:      Head: Normocephalic.   Cardiovascular:      Rate and Rhythm: Normal rate and regular rhythm.   Pulmonary:      Effort: Pulmonary effort is normal.      Breath sounds: Normal breath sounds.   Abdominal:      Palpations: Abdomen is soft.      Tenderness: There is no abdominal tenderness.   Musculoskeletal:      Cervical back: Neck supple.   Skin:     Findings: No rash.   Neurological:      Mental Status: She is alert.         Result Review  Labs:  Appointment on 04/24/2024   Component Date Value Ref Range Status    Creatinine, Ur 04/24/2024 51.5  Reference range not established. mg/dL Final    Albumin,U,Random 04/24/2024 10.0  <20.0 mg/L Final    Albumin Creat Ratio 04/24/2024 19  0 - 30 mg/g creatinine Final    C-Peptide 04/24/2024 4.5 (H)  1.1 - 4.4 ng/mL Final    C-Peptide reference interval is for fasting patients.       Imaging:   CT renal stone study abdomen pelvis wo contrast  Order: [id number]  Impression    IMPRESSION:    Fluid throughout the colon, without significant wall thickening, which could be  secondary to any cause of diarrhea.  Narrative    PROCEDURE INFORMATION:  Exam: CT Abdomen And Pelvis Without Contrast  Exam date and time: 9/18/2022 11:34 PM  Age: 72 years old  Clinical indication: Nausea and vomiting; Additional info: Nausea/vomiting    TECHNIQUE:  Imaging protocol: Computed tomography of the abdomen and pelvis without  contrast.  Radiation optimization: All CT scans at this facility use at least one of these  dose optimization techniques: automated exposure control; mA and/or kV  adjustment per patient size (includes targeted exams where dose is matched " to  clinical indication); or iterative reconstruction.    COMPARISON:  Images from a prior liver ultrasound dated 04/25/2022 are currently unavailable  for comparison, but the report was reviewed.    FINDINGS:    Liver:  The liver is heterogeneous with a nodular contour, consistent with  cirrhosis  Gallbladder and bile ducts: There is mild common bile duct and intrahepatic  biliary dilatation, in the setting of prior cholecystectomy.    Spleen: Unremarkable.  Adrenal glands:  Unremarkable.  Pancreas:  Unremarkable.    Kidneys and ureters:  Unremarkable.  Bladder: Unremarkable.  Reproductive:  The uterus is lobulated with large coarse calcification  consistent with fibroids.    Stomach and bowel:  There is fluid throughout the colon without significant  wall thickening.  There is no small bowel dilatation or bowel wall thickening.  Appendix: The appendix is identified and is normal.  Intraperitoneal space: There is no free fluid or pneumoperitoneum.    Lymph nodes: There are no lymph nodes with a short axis >1cm.  Arteries: Unremarkable.  Veins:  Unremarkable.    Inferior thorax: The lung bases are unremarkable.  Bones/joints:  There is diffuse osteopenia.  There are chronic appearing compression fractures in part related to Schmorl's  nodes at L1, L3, L4, and L5.  Degenerative changes are seen within the spine and pelvis.  Soft tissues: Body wall is unremarkable.    I reviewed all relevant imaging.  Please note:  This report has been generated by a voice recognition software system. Therefore there may be syntax, spelling, and/or grammatical errors. Please call if you have any questions.

## 2024-05-09 ENCOUNTER — TELEPHONE (OUTPATIENT)
Dept: HEMATOLOGY ONCOLOGY | Facility: CLINIC | Age: 74
End: 2024-05-09

## 2024-05-09 ENCOUNTER — TELEPHONE (OUTPATIENT)
Age: 74
End: 2024-05-09

## 2024-05-09 NOTE — TELEPHONE ENCOUNTER
Roxann and informed that Michelle wanted her to have labs done today, patient is aware and stated they will not be going until next Wednesday     ----- Message from Michelle Velazquez PA-C sent at 5/9/2024 12:34 PM EDT -----  Please remind patient she should go for blood work TODAY

## 2024-05-09 NOTE — TELEPHONE ENCOUNTER
"No one from the office left patient a message. However there was a note from C Peptide lab results from [facility].    Relayed Rosalba Message from Results  \" C Peptide is elevated confirming type 2 diabetes with insulin resistance.          "

## 2024-05-09 NOTE — TELEPHONE ENCOUNTER
Patient was returning a call, but I didn't see a note in the chart about a message that was to get relayed to the patient.

## 2024-05-14 ENCOUNTER — TELEPHONE (OUTPATIENT)
Dept: HEMATOLOGY ONCOLOGY | Facility: CLINIC | Age: 74
End: 2024-05-14

## 2024-05-14 ENCOUNTER — TELEPHONE (OUTPATIENT)
Age: 74
End: 2024-05-14

## 2024-05-14 NOTE — TELEPHONE ENCOUNTER
"Received vm from patient, \"Yes, this is the third time I've tried to reach someone. No ones answering. I love it. Guess we'll have to find another doctor Anyway, this is Iman Walden and my date of birth is [phone] and I don't care if you call me back or not.\"  "

## 2024-05-14 NOTE — TELEPHONE ENCOUNTER
Attempted to call patient and left her a vm stating that Michel had called and and lab scripts were in the chart. In message she is also explained that the hope line sends messages to staff and she will not reach a nurse directly.

## 2024-05-14 NOTE — TELEPHONE ENCOUNTER
Patient calling to ask if her labs that are ordered in the system can be drawn at anytime and if there are any urine tests ordered. Told patient what labs are ordered and that she can go to any [facility] lab. No further action needed.

## 2024-05-15 ENCOUNTER — APPOINTMENT (OUTPATIENT)
Age: 74
End: 2024-05-15
Payer: COMMERCIAL

## 2024-05-15 DIAGNOSIS — D72.10 EOSINOPHILIA, UNSPECIFIED TYPE: ICD-10-CM

## 2024-05-15 DIAGNOSIS — D64.9 ANEMIA, UNSPECIFIED TYPE: ICD-10-CM

## 2024-05-15 DIAGNOSIS — N18.32 CKD STAGE 3B, GFR 30-44 ML/MIN (HCC): ICD-10-CM

## 2024-05-15 LAB
ALBUMIN SERPL BCP-MCNC: 3.6 G/DL (ref 3.5–5)
ALP SERPL-CCNC: 56 U/L (ref 34–104)
ALT SERPL W P-5'-P-CCNC: 15 U/L (ref 7–52)
ANION GAP SERPL CALCULATED.3IONS-SCNC: 10 MMOL/L (ref 4–13)
AST SERPL W P-5'-P-CCNC: 16 U/L (ref 13–39)
BASOPHILS # BLD AUTO: 0.07 THOUSAND/UL (ref 0–0.1)
BASOPHILS NFR MAR MANUAL: 1 % (ref 0–1)
BILIRUB SERPL-MCNC: 0.31 MG/DL (ref 0.2–1)
BUN SERPL-MCNC: 29 MG/DL (ref 5–25)
CALCIUM SERPL-MCNC: 9.1 MG/DL (ref 8.4–10.2)
CHLORIDE SERPL-SCNC: 101 MMOL/L (ref 96–108)
CO2 SERPL-SCNC: 28 MMOL/L (ref 21–32)
CREAT SERPL-MCNC: 1.49 MG/DL (ref 0.6–1.3)
EOSINOPHIL # BLD AUTO: 0.85 THOUSAND/UL (ref 0–0.61)
EOSINOPHIL NFR BLD MANUAL: 13 % (ref 0–6)
ERYTHROCYTE [DISTWIDTH] IN BLOOD BY AUTOMATED COUNT: 20.6 % (ref 11.6–15.1)
FERRITIN SERPL-MCNC: 9 NG/ML (ref 11–307)
GFR SERPL CREATININE-BSD FRML MDRD: 35 ML/MIN/1.73SQ M
GLUCOSE P FAST SERPL-MCNC: 93 MG/DL (ref 65–99)
HCT VFR BLD AUTO: 30.2 % (ref 34.8–46.1)
HGB BLD-MCNC: 9 G/DL (ref 11.5–15.4)
IRON SATN MFR SERPL: 7 % (ref 15–50)
IRON SERPL-MCNC: 28 UG/DL (ref 50–212)
LDH SERPL-CCNC: 131 U/L (ref 140–271)
LYMPHOCYTES # BLD AUTO: 0.39 THOUSAND/UL (ref 0.6–4.47)
LYMPHOCYTES # BLD AUTO: 6 %
MCH RBC QN AUTO: 23 PG (ref 26.8–34.3)
MCHC RBC AUTO-ENTMCNC: 29.8 G/DL (ref 31.4–37.4)
MCV RBC AUTO: 77 FL (ref 82–98)
MONOCYTES # BLD AUTO: 0.46 THOUSAND/UL (ref 0–1.22)
MONOCYTES NFR BLD AUTO: 7 % (ref 4–12)
NEUTS SEG # BLD: 4.8 THOUSAND/UL (ref 1.81–6.82)
NEUTS SEG NFR BLD AUTO: 73 %
NRBC BLD AUTO-RTO: 0 /100 WBCS
PLATELET # BLD AUTO: 205 THOUSANDS/UL (ref 149–390)
PMV BLD AUTO: 10.3 FL (ref 8.9–12.7)
POTASSIUM SERPL-SCNC: 4.4 MMOL/L (ref 3.5–5.3)
PROT SERPL-MCNC: 6.7 G/DL (ref 6.4–8.4)
RBC # BLD AUTO: 3.91 MILLION/UL (ref 3.81–5.12)
RETICS # AUTO: ABNORMAL 10*3/UL (ref 14097–95744)
RETICS # CALC: 2.02 % (ref 0.37–1.87)
SODIUM SERPL-SCNC: 139 MMOL/L (ref 135–147)
TIBC SERPL-MCNC: 384 UG/DL (ref 250–450)
TOTAL CELLS COUNTED SPEC: 100
UIBC SERPL-MCNC: 356 UG/DL (ref 155–355)
WBC # BLD AUTO: 6.57 THOUSAND/UL (ref 4.31–10.16)

## 2024-05-15 PROCEDURE — 83550 IRON BINDING TEST: CPT

## 2024-05-15 PROCEDURE — 83540 ASSAY OF IRON: CPT

## 2024-05-15 PROCEDURE — 36415 COLL VENOUS BLD VENIPUNCTURE: CPT

## 2024-05-15 PROCEDURE — 85007 BL SMEAR W/DIFF WBC COUNT: CPT

## 2024-05-15 PROCEDURE — 80053 COMPREHEN METABOLIC PANEL: CPT

## 2024-05-15 PROCEDURE — 85045 AUTOMATED RETICULOCYTE COUNT: CPT

## 2024-05-15 PROCEDURE — 82668 ASSAY OF ERYTHROPOIETIN: CPT

## 2024-05-15 PROCEDURE — 83918 ORGANIC ACIDS TOTAL QUANT: CPT

## 2024-05-15 PROCEDURE — 82728 ASSAY OF FERRITIN: CPT

## 2024-05-15 PROCEDURE — 83615 LACTATE (LD) (LDH) ENZYME: CPT

## 2024-05-15 PROCEDURE — 83010 ASSAY OF HAPTOGLOBIN QUANT: CPT

## 2024-05-16 ENCOUNTER — VBI (OUTPATIENT)
Dept: ADMINISTRATIVE | Facility: OTHER | Age: 74
End: 2024-05-16

## 2024-05-16 ENCOUNTER — TELEPHONE (OUTPATIENT)
Dept: HEMATOLOGY ONCOLOGY | Facility: CLINIC | Age: 74
End: 2024-05-16

## 2024-05-16 ENCOUNTER — TELEPHONE (OUTPATIENT)
Dept: GYNECOLOGIC ONCOLOGY | Facility: CLINIC | Age: 74
End: 2024-05-16

## 2024-05-16 DIAGNOSIS — I85.11 SECONDARY ESOPHAGEAL VARICES WITH BLEEDING (HCC): ICD-10-CM

## 2024-05-16 DIAGNOSIS — D50.0 IRON DEFICIENCY ANEMIA DUE TO CHRONIC BLOOD LOSS: Primary | ICD-10-CM

## 2024-05-16 LAB
EPO SERPL-ACNC: 105.4 MIU/ML (ref 2.6–18.5)
HAPTOGLOB SERPL-MCNC: 162 MG/DL (ref 42–346)

## 2024-05-16 NOTE — TELEPHONE ENCOUNTER
Called patient to review labs results. She has iron deficiency anemia. Already on iron therapy. Has hx of cirrhosis with bleeding varices.  Recommend IV iron therapy, pt adamantly refuses. She is on oral iron every other day, recommend to increase to daily. Advised to follow up with GI to r/o bleeding varices. She requested referral which is now placed. She will have repeat CBC done in 1 month. Refuses to do it any sooner. RTC around that time.     Patient advised to go to the emergency room if she develops any shortness of breath, chest pain, fatigue, lightheadedness, dizziness, near syncope, etc

## 2024-05-16 NOTE — TELEPHONE ENCOUNTER
Lab Inquiry   Who are you speaking with? Patient     If it is not the patient, are they listed on an active communication consent form? N/A   Name of ordering provider Michelle Velazquez    What is being requested? Lab results to be reviewed   Lab draw location [facility]   What is the best call back number? [phone]    If patient at the lab, Was a live attempts to contact the team made? na

## 2024-05-18 LAB — METHYLMALONATE SERPL-SCNC: 310 NMOL/L (ref 0–378)

## 2024-05-20 ENCOUNTER — APPOINTMENT (OUTPATIENT)
Age: 74
End: 2024-05-20
Payer: COMMERCIAL

## 2024-05-20 ENCOUNTER — LAB (OUTPATIENT)
Age: 74
End: 2024-05-20
Payer: COMMERCIAL

## 2024-05-20 DIAGNOSIS — D64.9 ANEMIA, UNSPECIFIED TYPE: ICD-10-CM

## 2024-05-20 LAB — HEMOCCULT STL QL IA: POSITIVE

## 2024-05-20 PROCEDURE — G0328 FECAL BLOOD SCRN IMMUNOASSAY: HCPCS

## 2024-05-23 ENCOUNTER — TELEPHONE (OUTPATIENT)
Dept: HEMATOLOGY ONCOLOGY | Facility: CLINIC | Age: 74
End: 2024-05-23

## 2024-05-23 ENCOUNTER — NURSE TRIAGE (OUTPATIENT)
Age: 74
End: 2024-05-23

## 2024-05-23 NOTE — TELEPHONE ENCOUNTER
"SPOKE WITH PT, WOULD LIKE YOU TO REVIEW HER RECENT TESTING ORDERED BY HEM/ONC. FIT TEST POSITIVE. PT DENIES BLEEDING, SOB, DIZZINESS, LIGHTHEADEDNESS. PT WOULD LIKE YOUR RECOMMENDATIONS. THANK YOU.           Reason for Disposition   Information only question and nurse able to answer    Answer Assessment - Initial Assessment Questions  1. REASON FOR CALL or QUESTION: \"What is your reason for calling today?\" or \"How can I best help you?\" or \"What question do you have that I can help answer?\"      SPOKE WITH PT, WOULD LIKE YOU TO REVIEW HER RECENT TESTING ORDERED BY HEM/ONC. FIT TEST POSITIVE. PT WOULD LIKE YOUR RECOMMENDATIONS. THANK YOU.    Protocols used: Information Only Call - No Triage-ADULT-[place]    "

## 2024-05-23 NOTE — TELEPHONE ENCOUNTER
Patient Call    Who are you speaking with? Patient    If it is not the patient, are they listed on an active communication consent form? N/A   What is the reason for this call? Patient is requesting a call back from [place], would not leave any details   Does this require a call back? Yes   If a call back is required, please list best call back number [phone]   If a call back is required, advise that a message will be forwarded to their care team and someone will return their call as soon as possible.   Did you relay this information to the patient? Yes

## 2024-05-23 NOTE — TELEPHONE ENCOUNTER
"Returned call to patient and asked how I could help her.Patient states that she wanted to know exactly how much she is bleeding from the stool occult test that was reported. I educated we do not have a quantity for that and explained the test shows if there is blood that is not visible to naked eye. Asked patient if her stools were dark or black and she denies. Inquired if she started taking the iron tablet daily and not every other day as was recommended by Michelle in phone call but she has NOT started taking daily as of yet and states will do so. She is made aware that a referral was placed to GI and they will be getting in contact with her. She states that she has construction going on in the house and her  was having surgery so things will have to wait. She asked if I could let Michelle know \"she will never have stool testing done again\" and that she may not have time to go to other appt. She was given her appt with Michelle and made her aware to do labs a few days prior. Pt verbalizes understanding and states will be here then.  "

## 2024-05-24 NOTE — TELEPHONE ENCOUNTER
Called and spoke to patient. Gave patient message as per chirag. Let patient know that will leave a message for Anahi to schedule test.... when she is back on 5/28/2024 ( Tuesday )

## 2024-05-24 NOTE — TELEPHONE ENCOUNTER
Called and spoke to patient. Gave patient message as per Dr Goncalves. She agreed to have EGD done & I explained that the  for test will not be in till Tuesday 5/28/2024. She would just like to know if it is ok to wait to have it done till end of July/August .... Please advise. Thank you !

## 2024-05-31 ENCOUNTER — VBI (OUTPATIENT)
Dept: ADMINISTRATIVE | Facility: OTHER | Age: 74
End: 2024-05-31

## 2024-06-01 DIAGNOSIS — J30.2 SEASONAL ALLERGIES: ICD-10-CM

## 2024-06-01 RX ORDER — LORATADINE 10 MG/1
10 TABLET ORAL DAILY
Qty: 90 TABLET | Refills: 1 | Status: SHIPPED | OUTPATIENT
Start: 2024-06-01

## 2024-06-04 DIAGNOSIS — E11.42 DIABETIC PERIPHERAL NEUROPATHY (HCC): ICD-10-CM

## 2024-06-04 RX ORDER — GABAPENTIN 300 MG/1
600 CAPSULE ORAL 3 TIMES DAILY
Qty: 540 CAPSULE | Refills: 1 | Status: SHIPPED | OUTPATIENT
Start: 2024-06-04 | End: 2024-12-01

## 2024-06-04 NOTE — TELEPHONE ENCOUNTER
Reason for call:   [x] Refill   [] Prior Auth  [] Other:     Office:   [x] PCP/Provider - Bj  [] Specialty/Provider -     Medication: Gabapentin 300mg    Dose/Frequency: 2c tid    Quantity: 540    Pharmacy: [facility]/pharmacy #[id number] - [place], [place] - [address] [phone]     Does the patient have enough for 3 days?   [] Yes   [x] No - Send as HP to POD

## 2024-06-10 ENCOUNTER — TELEPHONE (OUTPATIENT)
Age: 74
End: 2024-06-10

## 2024-06-10 ENCOUNTER — PREP FOR PROCEDURE (OUTPATIENT)
Age: 74
End: 2024-06-10

## 2024-06-10 DIAGNOSIS — I85.11 SECONDARY ESOPHAGEAL VARICES WITH BLEEDING (HCC): Primary | ICD-10-CM

## 2024-06-10 NOTE — TELEPHONE ENCOUNTER
OA Questions for EGD  Date: 6/10/24   Screened by: Alexus Stanton     Referring Provider:      Pre-Screening: BMI 29.26  Past EGD? If yes - Date: 8/12/24   Physician/Facility: [facility]   Reason:     SCHEDULING STAFF: If the patient is over 75 years old, please schedule an office visit.  ·      Does the patient want to see a gastroenterologist prior to their procedure to discuss any GI symptoms? NO  ·      Has the patient been hospitalized or had abdominal surgery in the past 6 months? NO  ·      Does the patient use supplemental oxygen? NO  ·      Does the patient take [Coumadin], [Lovenox], [Plavix], [Eliquis], [Xarelto], or other blood thinning medication? NO  ·      Has the patient had a stroke, cardiac event, or stent placed in the past year? NO    Instructions sent to home address

## 2024-06-18 ENCOUNTER — TELEPHONE (OUTPATIENT)
Age: 74
End: 2024-06-18

## 2024-06-18 NOTE — TELEPHONE ENCOUNTER
Pt's  returning phone call. No note/encounter found. If the call was to confirm tomorrow's appt with Dr. Lorenzo, pt confirmed it.

## 2024-06-19 ENCOUNTER — OFFICE VISIT (OUTPATIENT)
Dept: FAMILY MEDICINE CLINIC | Facility: CLINIC | Age: 74
End: 2024-06-19
Payer: COMMERCIAL

## 2024-06-19 VITALS
TEMPERATURE: 97.8 F | SYSTOLIC BLOOD PRESSURE: 116 MMHG | HEART RATE: 81 BPM | DIASTOLIC BLOOD PRESSURE: 72 MMHG | HEIGHT: 58 IN | OXYGEN SATURATION: 98 % | WEIGHT: 139 LBS | BODY MASS INDEX: 29.18 KG/M2

## 2024-06-19 DIAGNOSIS — E11.9 DIABETES MELLITUS TYPE 2 IN NONOBESE (HCC): ICD-10-CM

## 2024-06-19 DIAGNOSIS — I85.11 SECONDARY ESOPHAGEAL VARICES WITH BLEEDING (HCC): Primary | ICD-10-CM

## 2024-06-19 DIAGNOSIS — F11.20 CONTINUOUS OPIOID DEPENDENCE (HCC): ICD-10-CM

## 2024-06-19 DIAGNOSIS — E11.42 DIABETIC PERIPHERAL NEUROPATHY (HCC): ICD-10-CM

## 2024-06-19 PROCEDURE — 99214 OFFICE O/P EST MOD 30 MIN: CPT | Performed by: STUDENT IN AN ORGANIZED HEALTH CARE EDUCATION/TRAINING PROGRAM

## 2024-06-19 PROCEDURE — G2211 COMPLEX E/M VISIT ADD ON: HCPCS | Performed by: STUDENT IN AN ORGANIZED HEALTH CARE EDUCATION/TRAINING PROGRAM

## 2024-06-19 RX ORDER — TRAMADOL HYDROCHLORIDE 50 MG/1
50 TABLET ORAL DAILY PRN
Qty: 10 TABLET | Refills: 0 | Status: CANCELLED | OUTPATIENT
Start: 2024-06-19

## 2024-06-19 NOTE — PROGRESS NOTES
Assessment/Plan:         Problem List Items Addressed This Visit        Cardiovascular and Mediastinum    Secondary esophageal varices with bleeding (HCC) - Primary     Repeat EGD with GI coming up            Endocrine    Diabetes mellitus type 2 in nonobese (HCC)    Relevant Orders    Albumin / creatinine urine ratio    Comprehensive metabolic panel    Hemoglobin A1C    Diabetic peripheral neuropathy (HCC)       Behavioral Health    Continuous opioid dependence (HCC)     Going to get diabetes lab next week with labs for Hematology. Sarita djust insulin as needed based off that.    Subjective:      Patient ID: Iman Walden is a 74 y.o. female.    HPI    Follow up chronic conditions, has EGD scheduled  to check varices with Dr Goncalves    Has not checked glucose readings at all, does not want to do finger sticks and CGM was to painful. Reports compliance with medications, has noticed some periods of hand shaking and weakness, unsure if related to low blood glucose. Has tried coming off rice crispies     Has lost some weight    The following portions of the patient's history were reviewed and updated as appropriate:   Past Medical History:  She has a past medical history of Acute blood loss anemia (02/20/2021), Acute embolism and thrombosis of other specified deep vein of right lower extremity (HCC) (11/16/2022), ALC (alcoholic liver cirrhosis) (HCC), Chronic kidney disease, Colon polyp, Diabetes mellitus (HCC), Edema (02/23/2021), HLD (hyperlipidemia), Hypertension, Neuropathy, Neuropathy (02/20/2021), Pleural effusion (02/20/2021), Seasonal allergies, and Thrombocytopenia (HCC).,  _______________________________________________________________________  Medical Problems:  does not have any pertinent problems on file.,  _______________________________________________________________________  Past Surgical History:   has a past surgical history that includes Breast biopsy; Cholecystectomy; and Nasal  septum surgery.,  _______________________________________________________________________  Family History:  family history includes Emphysema in her mother; Heart attack in her father; Heart disease in her father.,  _______________________________________________________________________  Social History:   reports that she has never smoked. She has never been exposed to tobacco smoke. She has never used smokeless tobacco. She reports current drug use. Drug: Marijuana. She reports that she does not drink alcohol.,  _______________________________________________________________________  Allergies:  is allergic to crab (diagnostic) - food allergy, bee pollen, and pollen extract..  _______________________________________________________________________  Current Outpatient Medications   Medication Sig Dispense Refill   • atorvastatin (LIPITOR) 20 mg tablet Take 1 tablet (20 mg total) by mouth daily 90 tablet 3   • Blood Glucose Monitoring Suppl ([employer] Blood Glucose Meter) w/Device KIT OneTouch Ultra. Use TID. E11.65     • CALCIUM PO Take by mouth     • carvedilol (COREG) 3.125 mg tablet TAKE 1 TABLET TWICE A DAY WITH MEALS 180 tablet 3   • Continuous Blood Gluc Sensor (FreeStyle Macy 3 Sensor) MISC Use 1 each every 14 (fourteen) days 6 each 3   • Continuous Blood Gluc Transmit (Dexcom G6 Transmitter) MISC Change every 3 months. E11.65     • ferrous sulfate 325 (65 Fe) mg tablet Take 1 tablet (325 mg total) by mouth every other day 90 tablet 1   • furosemide (LASIX) 40 mg tablet TAKE 1 TABLET DAILY 90 tablet 3   • gabapentin (NEURONTIN) 300 mg capsule Take 2 capsules (600 mg total) by mouth 3 (three) times a day 540 capsule 1   • glimepiride (AMARYL) 4 mg tablet TAKE 1 TABLET TWICE A  tablet 5   • Jardiance 25 MG TABS TAKE 1 TABLET DAILY 90 tablet 3   • Lancets (OneTouch Delica Plus [id number]) MISC USE THREE TIMES A  each 2   • loratadine (CLARITIN) 10 mg tablet TAKE 1 TABLET BY MOUTH EVERY DAY 90 tablet 1  "  • metFORMIN (GLUCOPHAGE) 500 mg tablet TAKE 1 TABLET THREE TIMES A  tablet 1   • MULTIPLE VITAMIN PO Take 1 tablet by mouth     • pantoprazole (PROTONIX) 40 mg tablet TAKE 1 TABLET BY MOUTH EVERY DAY 90 tablet 1   • sodium chloride (EAMON 128) 5 % hypertonic ophthalmic solution Administer 1 drop to both eyes every evening     • traMADol (ULTRAM) 50 mg tablet Take 1 tablet (50 mg total) by mouth daily as needed for severe pain 10 tablet 0   • Insulin Glargine Solostar (Lantus SoloStar) 100 UNIT/ML SOPN Inject 0.3 mL (30 Units total) under the skin daily at bedtime       No current facility-administered medications for this visit.     _______________________________________________________________________  Review of Systems   Constitutional:  Negative for chills, fatigue and fever.   HENT:  Negative for rhinorrhea and sore throat.    Eyes:  Negative for visual disturbance.   Respiratory:  Negative for cough and shortness of breath.    Cardiovascular:  Negative for chest pain and palpitations.   Gastrointestinal:  Negative for abdominal pain, constipation, diarrhea, nausea and vomiting.   Genitourinary:  Negative for difficulty urinating, dysuria and frequency.   Musculoskeletal:  Negative for arthralgias and myalgias.   Skin:  Negative for color change and rash.   Neurological:  Negative for weakness and headaches.         Objective:  Vitals:    06/19/24 1213   BP: 116/72   Pulse: 81   Temp: 97.8 °F (36.6 °C)   SpO2: 98%   Weight: 63 kg (139 lb)   Height: 4' 10\" (1.473 m)     Body mass index is 29.05 kg/m².     Physical Exam  Constitutional:       General: She is not in acute distress.     Appearance: She is not ill-appearing.   HENT:      Head: Normocephalic and atraumatic.      Right Ear: External ear normal.      Left Ear: External ear normal.      Nose: Nose normal. No congestion or rhinorrhea.      Mouth/Throat:      Mouth: Mucous membranes are moist.      Pharynx: Oropharynx is clear. No oropharyngeal " exudate or posterior oropharyngeal erythema.   Eyes:      Extraocular Movements: Extraocular movements intact.      Conjunctiva/sclera: Conjunctivae normal.      Pupils: Pupils are equal, round, and reactive to light.   Cardiovascular:      Rate and Rhythm: Normal rate and regular rhythm.      Pulses: Normal pulses.      Heart sounds: No murmur heard.  Pulmonary:      Effort: Pulmonary effort is normal. No respiratory distress.      Breath sounds: Normal breath sounds. No wheezing.   Chest:      Chest wall: No tenderness.   Abdominal:      General: Bowel sounds are normal.      Palpations: Abdomen is soft.      Tenderness: There is no abdominal tenderness.   Musculoskeletal:         General: Normal range of motion.      Cervical back: Normal range of motion.   Skin:     General: Skin is warm and dry.      Capillary Refill: Capillary refill takes less than 2 seconds.      Findings: No rash.   Neurological:      General: No focal deficit present.      Mental Status: She is alert. Mental status is at baseline.

## 2024-06-21 ENCOUNTER — TELEPHONE (OUTPATIENT)
Dept: HEMATOLOGY ONCOLOGY | Facility: CLINIC | Age: 74
End: 2024-06-21

## 2024-06-21 NOTE — TELEPHONE ENCOUNTER
Call out to patient for lab reminder.   Patient unable to make appointment on Monday, rescheduled to next date/time that worked for patient.

## 2024-06-26 ENCOUNTER — APPOINTMENT (OUTPATIENT)
Age: 74
End: 2024-06-26
Payer: COMMERCIAL

## 2024-06-26 DIAGNOSIS — D50.0 IRON DEFICIENCY ANEMIA DUE TO CHRONIC BLOOD LOSS: ICD-10-CM

## 2024-06-26 DIAGNOSIS — E11.9 DIABETES MELLITUS TYPE 2 IN NONOBESE (HCC): ICD-10-CM

## 2024-06-26 LAB
ALBUMIN SERPL BCG-MCNC: 3.3 G/DL (ref 3.5–5)
ALP SERPL-CCNC: 54 U/L (ref 34–104)
ALT SERPL W P-5'-P-CCNC: 15 U/L (ref 7–52)
ANION GAP SERPL CALCULATED.3IONS-SCNC: 7 MMOL/L (ref 4–13)
AST SERPL W P-5'-P-CCNC: 18 U/L (ref 13–39)
BASOPHILS # BLD AUTO: 0.04 THOUSANDS/ÂΜL (ref 0–0.1)
BASOPHILS NFR BLD AUTO: 1 % (ref 0–1)
BILIRUB SERPL-MCNC: 0.33 MG/DL (ref 0.2–1)
BUN SERPL-MCNC: 21 MG/DL (ref 5–25)
CALCIUM ALBUM COR SERPL-MCNC: 9.1 MG/DL (ref 8.3–10.1)
CALCIUM SERPL-MCNC: 8.5 MG/DL (ref 8.4–10.2)
CHLORIDE SERPL-SCNC: 104 MMOL/L (ref 96–108)
CO2 SERPL-SCNC: 29 MMOL/L (ref 21–32)
CREAT SERPL-MCNC: 1.19 MG/DL (ref 0.6–1.3)
CREAT UR-MCNC: 65.1 MG/DL
EOSINOPHIL # BLD AUTO: 0.6 THOUSAND/ÂΜL (ref 0–0.61)
EOSINOPHIL NFR BLD AUTO: 11 % (ref 0–6)
ERYTHROCYTE [DISTWIDTH] IN BLOOD BY AUTOMATED COUNT: 19 % (ref 11.6–15.1)
EST. AVERAGE GLUCOSE BLD GHB EST-MCNC: 177 MG/DL
FERRITIN SERPL-MCNC: 22 NG/ML (ref 11–307)
GFR SERPL CREATININE-BSD FRML MDRD: 45 ML/MIN/1.73SQ M
GLUCOSE P FAST SERPL-MCNC: 127 MG/DL (ref 65–99)
HBA1C MFR BLD: 7.8 %
HCT VFR BLD AUTO: 31.8 % (ref 34.8–46.1)
HGB BLD-MCNC: 9.7 G/DL (ref 11.5–15.4)
IMM GRANULOCYTES # BLD AUTO: 0.02 THOUSAND/UL (ref 0–0.2)
IMM GRANULOCYTES NFR BLD AUTO: 0 % (ref 0–2)
IRON SATN MFR SERPL: 8 % (ref 15–50)
IRON SERPL-MCNC: 25 UG/DL (ref 50–212)
LYMPHOCYTES # BLD AUTO: 0.59 THOUSANDS/ÂΜL (ref 0.6–4.47)
LYMPHOCYTES NFR BLD AUTO: 10 % (ref 14–44)
MCH RBC QN AUTO: 25 PG (ref 26.8–34.3)
MCHC RBC AUTO-ENTMCNC: 30.5 G/DL (ref 31.4–37.4)
MCV RBC AUTO: 82 FL (ref 82–98)
MICROALBUMIN UR-MCNC: 14.7 MG/L
MICROALBUMIN/CREAT 24H UR: 23 MG/G CREATININE (ref 0–30)
MONOCYTES # BLD AUTO: 0.38 THOUSAND/ÂΜL (ref 0.17–1.22)
MONOCYTES NFR BLD AUTO: 7 % (ref 4–12)
NEUTROPHILS # BLD AUTO: 4.03 THOUSANDS/ÂΜL (ref 1.85–7.62)
NEUTS SEG NFR BLD AUTO: 71 % (ref 43–75)
NRBC BLD AUTO-RTO: 0 /100 WBCS
PLATELET # BLD AUTO: 153 THOUSANDS/UL (ref 149–390)
PMV BLD AUTO: 10.4 FL (ref 8.9–12.7)
POTASSIUM SERPL-SCNC: 4.5 MMOL/L (ref 3.5–5.3)
PROT SERPL-MCNC: 6.2 G/DL (ref 6.4–8.4)
RBC # BLD AUTO: 3.88 MILLION/UL (ref 3.81–5.12)
SODIUM SERPL-SCNC: 140 MMOL/L (ref 135–147)
TIBC SERPL-MCNC: 320 UG/DL (ref 250–450)
UIBC SERPL-MCNC: 295 UG/DL (ref 155–355)
WBC # BLD AUTO: 5.66 THOUSAND/UL (ref 4.31–10.16)

## 2024-06-26 PROCEDURE — 82728 ASSAY OF FERRITIN: CPT

## 2024-06-26 PROCEDURE — 80053 COMPREHEN METABOLIC PANEL: CPT

## 2024-06-26 PROCEDURE — 82043 UR ALBUMIN QUANTITATIVE: CPT

## 2024-06-26 PROCEDURE — 82570 ASSAY OF URINE CREATININE: CPT

## 2024-06-26 PROCEDURE — 85025 COMPLETE CBC W/AUTO DIFF WBC: CPT

## 2024-06-26 PROCEDURE — 36415 COLL VENOUS BLD VENIPUNCTURE: CPT

## 2024-06-26 PROCEDURE — 83036 HEMOGLOBIN GLYCOSYLATED A1C: CPT

## 2024-06-26 PROCEDURE — 83540 ASSAY OF IRON: CPT

## 2024-06-26 PROCEDURE — 83550 IRON BINDING TEST: CPT

## 2024-06-26 PROCEDURE — 3051F HG A1C>EQUAL 7.0%<8.0%: CPT | Performed by: STUDENT IN AN ORGANIZED HEALTH CARE EDUCATION/TRAINING PROGRAM

## 2024-07-02 DIAGNOSIS — I10 PRIMARY HYPERTENSION: ICD-10-CM

## 2024-07-02 RX ORDER — CARVEDILOL 3.12 MG/1
TABLET ORAL
Qty: 180 TABLET | Refills: 1 | Status: SHIPPED | OUTPATIENT
Start: 2024-07-02

## 2024-07-09 ENCOUNTER — OFFICE VISIT (OUTPATIENT)
Age: 74
End: 2024-07-09
Payer: COMMERCIAL

## 2024-07-09 VITALS
WEIGHT: 140 LBS | SYSTOLIC BLOOD PRESSURE: 118 MMHG | HEART RATE: 82 BPM | DIASTOLIC BLOOD PRESSURE: 62 MMHG | HEIGHT: 58 IN | OXYGEN SATURATION: 99 % | BODY MASS INDEX: 29.39 KG/M2

## 2024-07-09 DIAGNOSIS — E11.9 DIABETES MELLITUS TYPE 2 IN NONOBESE (HCC): Primary | ICD-10-CM

## 2024-07-09 DIAGNOSIS — E78.2 MIXED HYPERLIPIDEMIA: ICD-10-CM

## 2024-07-09 DIAGNOSIS — I12.9 HYPERTENSION ASSOCIATED WITH STAGE 3B CHRONIC KIDNEY DISEASE DUE TO TYPE 2 DIABETES MELLITUS (HCC): ICD-10-CM

## 2024-07-09 DIAGNOSIS — N18.32 HYPERTENSION ASSOCIATED WITH STAGE 3B CHRONIC KIDNEY DISEASE DUE TO TYPE 2 DIABETES MELLITUS (HCC): ICD-10-CM

## 2024-07-09 DIAGNOSIS — E11.22 HYPERTENSION ASSOCIATED WITH STAGE 3B CHRONIC KIDNEY DISEASE DUE TO TYPE 2 DIABETES MELLITUS (HCC): ICD-10-CM

## 2024-07-09 DIAGNOSIS — N18.30 STAGE 3 CHRONIC KIDNEY DISEASE, UNSPECIFIED WHETHER STAGE 3A OR 3B CKD (HCC): ICD-10-CM

## 2024-07-09 PROCEDURE — 99214 OFFICE O/P EST MOD 30 MIN: CPT

## 2024-07-09 NOTE — ASSESSMENT & PLAN NOTE
A1c dramatically improved from previous visit.  Discussed goal A1c is 7%.  Unable to make changes to patient's regimen at this time as she does not perform blood glucose monitoring.  Denies episodes of hypoglycemia.  Reports occasional noncompliance with Lantus injection due to falling asleep and fear of needles.  Encouraged to wear her freestyle cramita 3 continuous glucose monitor so that we may have a better understanding of her blood glucose levels throughout the day.  If unable to tolerate wearing CGM, she is to check her fingersticks at least once a day at different times of the day.    At this time, medication regimen remains the same. She is agreeable to lessening her dietary excursions and following a better diabetic diet.    Poor candidate for GLP-1 agonist class of medications due to esophageal varices.  May be able to tolerate Rybelsus however.  Will discuss further at next appointment.    Follow-up visit in 3 months.  Labs prior to next visit.  Lab Results   Component Value Date    HGBA1C 7.8 (H) 06/26/2024

## 2024-07-09 NOTE — PROGRESS NOTES
Ambulatory Visit  Name: Iman Walden      : 1950      MRN: [id number]  Encounter Provider: KRYSTLE Schaeffer  Encounter Date: 2024   Encounter department: [facility]    Assessment & Plan   1. Diabetes mellitus type 2 in nonobese (HCC)  Assessment & Plan:  A1c dramatically improved from previous visit.  Discussed goal A1c is 7%.  Unable to make changes to patient's regimen at this time as she does not perform blood glucose monitoring.  Denies episodes of hypoglycemia.  Reports occasional noncompliance with Lantus injection due to falling asleep and fear of needles.  Encouraged to wear her freestyle carmita 3 continuous glucose monitor so that we may have a better understanding of her blood glucose levels throughout the day.  If unable to tolerate wearing CGM, she is to check her fingersticks at least once a day at different times of the day.    At this time, medication regimen remains the same. She is agreeable to lessening her dietary excursions and following a better diabetic diet.    Poor candidate for GLP-1 agonist class of medications due to esophageal varices.  May be able to tolerate Rybelsus however.  Will discuss further at next appointment.    Follow-up visit in 3 months.  Labs prior to next visit.  Lab Results   Component Value Date    HGBA1C 7.8 (H) 2024     Orders:  -     Hemoglobin A1C; Future; Expected date: 10/09/2024  -     Comprehensive metabolic panel; Future; Expected date: 10/09/2024  2. Stage 3 chronic kidney disease, unspecified whether stage 3a or 3b CKD ([facility])  Assessment & Plan:  Follows with nephrology.  Continue to monitor eGFR for appropriateness of metformin therapy.  Will need to stop metformin if eGFR falls below 30.    Lab Results   Component Value Date    EGFR 45 2024    EGFR 35 05/15/2024    EGFR 38 2024    CREATININE 1.19 2024    CREATININE 1.49 (H) 05/15/2024     CREATININE 1.39 (H) 02/21/2024     3. Hypertension associated with stage 3b chronic kidney disease due to type 2 diabetes mellitus (HCC)  Assessment & Plan:  /62 in the office today.  Continue current regimen.  4. Mixed hyperlipidemia  Assessment & Plan:  Will check lipid panel prior to next visit.  Continue atorvastatin 20 mg daily.      History of Present Illness     Iman Walden is a 74 y.o. female who presents to the office today for follow-up of Type 2 Diabetes, with long term insulin use.  Past medical history significant for anemia, DVT, bleeding esophageal varices, alcoholic liver cirrhosis, CKD 3a, HLD, neuropathy, pleural effusion, thrombocytopenia.  She was last seen in the office 3/27/2024 at which time her A1c was 9.6% and she was unwilling to make changes to her medication regimen, accept referral to MNT, or use CGM.  Her most recent A1c is now 7.8%.    Current home glucose monitoring includes: NOTHING .    Current Medication Regimen:   Glimepiride 4 mg twice daily  Lantus 30 units nightly - misses   Jardiance 25 mg daily  Metformin 500 mg 3 times daily    Diabetic Eye Exam: UTD  Follows with Podiatry: UTD  Has Thyroid Disorder: No  Has Hypertension, managed by PCP, taking: Carvedilol 3.125 mg twice daily  Has Hyperlipidemia, managed by PCP, taking: Atorvastatin 20 mg daily, Tolerating well with no myalgias  History of Pancreatitis: No      Review of Systems   Constitutional:  Negative for chills and fever.   HENT:  Negative for congestion and sore throat.    Respiratory:  Negative for cough and shortness of breath.    Cardiovascular:  Negative for chest pain and palpitations.   Gastrointestinal:  Positive for blood in stool. Negative for abdominal pain, nausea and vomiting.   Endocrine: Negative for polydipsia and polyuria.   Musculoskeletal:  Positive for gait problem.   Skin:  Negative for wound.   Neurological:  Negative for tremors and headaches.   Psychiatric/Behavioral:  " The patient is not nervous/anxious.        Objective     /62 (BP Location: Right arm, Patient Position: Sitting, Cuff Size: Standard)   Pulse 82   Ht 4' 10\" (1.473 m)   Wt 63.5 kg (140 lb)   SpO2 99%   BMI 29.26 kg/m²     Physical Exam  Vitals reviewed.   Constitutional:       General: She is not in acute distress.     Appearance: Normal appearance. She is well-developed.   HENT:      Head: Normocephalic and atraumatic.      Mouth/Throat:      Mouth: Mucous membranes are moist.   Eyes:      Conjunctiva/sclera: Conjunctivae normal.   Cardiovascular:      Rate and Rhythm: Normal rate.   Pulmonary:      Effort: Pulmonary effort is normal. No respiratory distress.   Abdominal:      Palpations: Abdomen is soft.      Tenderness: There is no abdominal tenderness.   Musculoskeletal:         General: No swelling.      Cervical back: Normal range of motion.   Skin:     General: Skin is warm and dry.      Capillary Refill: Capillary refill takes less than 2 seconds.   Neurological:      General: No focal deficit present.      Mental Status: She is alert and oriented to person, place, and time.   Psychiatric:         Mood and Affect: Mood normal.         Behavior: Behavior normal.         Thought Content: Thought content normal.       Administrative Statements           "

## 2024-07-09 NOTE — ASSESSMENT & PLAN NOTE
Follows with nephrology.  Continue to monitor eGFR for appropriateness of metformin therapy.  Will need to stop metformin if eGFR falls below 30.    Lab Results   Component Value Date    EGFR 45 06/26/2024    EGFR 35 05/15/2024    EGFR 38 02/21/2024    CREATININE 1.19 06/26/2024    CREATININE 1.49 (H) 05/15/2024    CREATININE 1.39 (H) 02/21/2024

## 2024-07-15 ENCOUNTER — OFFICE VISIT (OUTPATIENT)
Dept: HEMATOLOGY ONCOLOGY | Facility: CLINIC | Age: 74
End: 2024-07-15
Payer: COMMERCIAL

## 2024-07-15 VITALS
HEIGHT: 58 IN | BODY MASS INDEX: 29.6 KG/M2 | OXYGEN SATURATION: 98 % | HEART RATE: 82 BPM | DIASTOLIC BLOOD PRESSURE: 74 MMHG | RESPIRATION RATE: 15 BRPM | TEMPERATURE: 97.2 F | SYSTOLIC BLOOD PRESSURE: 112 MMHG | WEIGHT: 141 LBS

## 2024-07-15 DIAGNOSIS — D50.0 IRON DEFICIENCY ANEMIA DUE TO CHRONIC BLOOD LOSS: ICD-10-CM

## 2024-07-15 DIAGNOSIS — N18.32 CKD STAGE 3B, GFR 30-44 ML/MIN (HCC): Primary | ICD-10-CM

## 2024-07-15 DIAGNOSIS — D64.9 ANEMIA, UNSPECIFIED TYPE: ICD-10-CM

## 2024-07-15 PROCEDURE — 99213 OFFICE O/P EST LOW 20 MIN: CPT | Performed by: PHYSICIAN ASSISTANT

## 2024-07-15 RX ORDER — FERROUS SULFATE 325(65) MG
1 TABLET ORAL
Qty: 90 TABLET | Refills: 1 | Status: SHIPPED | OUTPATIENT
Start: 2024-07-15

## 2024-07-15 NOTE — PROGRESS NOTES
[facility]ATOLOG[facility] [place]  [address] [place] [address]  Hematology Ambulatory Follow-Up  Iman Walden, 1950, [id number]  7/15/2024      Assessment and Plan   1. Iron deficiency anemia due to chronic blood loss  2. CKD stage 3b, GFR 30-44 ml/min (HCC)  - CBC and differential; Future  - CBC and differential; Future  - Iron Panel (Includes Ferritin, Iron Sat%, Iron, and TIBC); Future    In summary this is a 74-year-old female with history of CKD, cirrhosis who is seen in follow up today for iron deficiency anemia. She has history of anemia from bleeding esophageal varices in 2021. Hgb normalized in 2022 and anemia reoccurred in 2023 and hgb has been steadily declining. When patient was seen in consultation 05/2024 her hemoglobin was 7.3.  Labs are consistent with iron deficiency anemia and she was started on oral supplements, taking 3x per week.  She did refuse IV iron.  Hemoccult study was positive.  Patient was strongly encouraged to follow-up with her gastroenterologist.  She has endoscopy scheduled for next month.  2 weeks ago she observed maroon-colored stools x 1 day which now has resolved.  Most recent CBC from 06/26/2024 showed hemoglobin 9.7, MCV 82, platelets 153,000.  Iron saturation 8%, UIBC normal, ferritin 22.  Increased from prior ferritin level of 9.     Discussion/plan  Patient continues to refuse IV iron.  Continue oral iron daily.  Rx sent to her pharmacy per patient request.  She was strongly encouraged to follow-up with her gastroenterologist sooner however patient declined and she would like to continue observation and follow-up with EGD as planned.  I explained to the patient that she likely has a multifactorial anemia secondary to iron deficiency and chronic renal disease.  If her anemia persists despite adequate ferritin stores, we may consider GLORIA in future for hgb <10g/dL.  I recommended to monitor her  CBC monthly giving concern for GI blood loss however patient refuses.  She is agreeable to CBC every 2 months only as she has difficult time with lab draws.  Will consider PRBC transfusion for hemoglobin less than 7 g/dL.  RTC 4 months patient was advised to notify our office immediately if she has any bleeding as she will need labs sooner to monitor her anemia.  If she has any significant bleeding and is symptomatic (fatigue, lightheadedness, dizziness, shortness of breath, mere syncope) she should go to the emergency room immediately.    Patient voiced agreement and understanding to the above.   Patient advised to call the Hematology/Oncology office with any questions and concerns regarding the above.    Barrier(s) to care: None  The patient is able to self care.    Michelle Velazquez PA-C   Medical Oncology/Hematology  [facility]    Subjective     Chief Complaint   Patient presents with    Follow-up       History of present illness:  74-year-old female with past medical history of alcohol use, hepatitis C, cirrhosis, CKD stage III, type 2 diabetes complicated by peripheral neuropathy and hypertension who has been referred by her PCP for evaluation and management of anemia.     Patient reports she has been anemic for many years. There are limited data points in EMR available for review.  Does appear that the patient has had anemia since at least 2021.  Around this time she was hospitalized for fall with subsequent compression fracture.  She was found to have cirrhosis during admission and had a upper GI bleed due to bleeding varices.  She does report history of alcohol use and hepatitis C.  She received her only blood transfusion of lites during this hospitalization.  Follow-up labs in 2022 showed normal hemoglobin in the 11-12 range   Until April 2023 where she developed recurrent anemia.  Hemoglobin at this time was around 9 g/dL.  Her most recent labs from February 2024 show worsening  anemia with hemoglobin of 7.3 hemoglobin of 7.3, labs consistent with a microcytic anemia.  WBC and platelet count are normal however differential does show eosinophilia with absolute significant count of 1.02. There is no recent iron panel available for review.  Last ferritin level was low in 2020.  Recent B12 level 761.     She denies history of malabsorption condition, hematochezia, melena, hematuria or vaginal bleeding.  She is on oral iron every other day and has been on this regimen for several months.  No history of IV iron.  She is on PPI chronically.     Rare alcohol use. No tobacco use. She is on medical marijuana vape. No other drug use.       No history of thyroid cancer. No pernasal history of cancer   Mothers aunt had breast cancer, Maternal grandmother skin cancer, maternal grandfather unknown CA type      Last EGD 09/2022: Nonbleeding small esophageal varices. Multiple inflamed gastric polyps possibly the cause of hematemesis. Duodenal polyp possibly Brunner's gland.  Pathology showed hyperplastic polyp.  Last colonoscopy 05/2023: polyps      02/2024: WBC 7.2, hemoglobin 7.3, MCV 72, RDW 17, platelets 209,000.  Creatinine 1.39, EGFR 38.    05/2024: WBC 6.57, hemoglobin 9.0 g/dL, MCV 77, RDW 20.6, platelets 205,000. eGFR 35. .  LD low.  Haptoglobin normal.  reticulocyte count 2%.  FIT  positive +. started oral iron therapy    06/2024: hemoglobin 9.7g/dL, MCV 82, RDW 19.0 platelets 153,000.  Iron saturation 8%, UIBC normal, ferritin 22.  Increased from prior ferritin level of 9. eGFR45    07/15/24 :  Lab Results   Component Value Date    IRON 25 (L) 06/26/2024    TIBC 320 06/26/2024    FERRITIN 22 06/26/2024     Lab Results   Component Value Date    WBC 5.66 06/26/2024    HGB 9.7 (L) 06/26/2024    HCT 31.8 (L) 06/26/2024    MCV 82 06/26/2024     06/26/2024       Interval history: Patient had maroon stools approximately 2 weeks ago.  Now resolved.  Denies abdominal pain, shortness of  breath, fatigue, dizziness or lightheadedness.  She has not observed any bright red blood per rectum.  Has EGD planned for next month.  On oral iron 3 times a week.    Review of Systems   All other systems reviewed and are negative.      Patient Active Problem List   Diagnosis    Compression fracture of lumbar vertebra (HCC)    Alcoholic cirrhosis of liver without ascites (HCC)    Diabetes mellitus type 2 in nonobese (HCC)    Secondary esophageal varices with bleeding (HCC)    Continuous opioid dependence (HCC)    Diabetic peripheral neuropathy (HCC)    Stage 3 chronic kidney disease, unspecified whether stage 3a or 3b CKD (HCC)    Hypertension secondary to other renal disorders    Hypertension associated with stage 3b chronic kidney disease due to type 2 diabetes mellitus (HCC)    Mixed hyperlipidemia     Past Medical History:   Diagnosis Date    Acute blood loss anemia 02/20/2021    Acute embolism and thrombosis of other specified deep vein of right lower extremity (HCC) 11/16/2022    ALC (alcoholic liver cirrhosis) (HCC)     Chronic kidney disease     Colon polyp     Diabetes mellitus (HCC)     Edema 02/23/2021    HLD (hyperlipidemia)     Hypertension     Neuropathy     Neuropathy 02/20/2021    Pleural effusion 02/20/2021    Seasonal allergies     Thrombocytopenia (HCC)      Past Surgical History:   Procedure Laterality Date    BREAST BIOPSY      CHOLECYSTECTOMY      NASAL SEPTUM SURGERY       Family History   Problem Relation Age of Onset    Emphysema Mother     Heart attack Father     Heart disease Father      Social History     Socioeconomic History    Marital status: /Civil Union     Spouse name: None    Number of children: None    Years of education: None    Highest education level: None   Occupational History    None   Tobacco Use    Smoking status: Never     Passive exposure: Never    Smokeless tobacco: Never   Vaping Use    Vaping status: Never Used   Substance and Sexual Activity    Alcohol use:  Never    Drug use: Yes     Types: Marijuana     Comment:  unknown    Sexual activity: Yes     Partners: Male   Other Topics Concern    None   Social History Narrative    None     Social Determinants of Health     Financial Resource Strain: Low Risk  (11/22/2023)    Overall Financial Resource Strain (CARDIA)     Difficulty of Paying Living Expenses: Not hard at all   Food Insecurity: Not on file   Transportation Needs: No Transportation Needs (11/22/2023)    PRAPARE - Transportation     Lack of Transportation (Medical): No     Lack of Transportation (Non-Medical): No   Physical Activity: Not on file   Stress: Not on file   Social Connections: Not on file   Intimate Partner Violence: Not on file   Housing Stability: Not on file       Current Outpatient Medications:     atorvastatin (LIPITOR) 20 mg tablet, Take 1 tablet (20 mg total) by mouth daily, Disp: 90 tablet, Rfl: 3    CALCIUM PO, Take by mouth, Disp: , Rfl:     carvedilol (COREG) 3.125 mg tablet, TAKE 1 TABLET TWICE A DAY WITH MEALS, Disp: 180 tablet, Rfl: 1    Continuous Blood Gluc Sensor (360TStyle Macy 3 Sensor) MISC, Use 1 each every 14 (fourteen) days, Disp: 6 each, Rfl: 3    ferrous sulfate 325 (65 Fe) mg tablet, Take 1 tablet (325 mg total) by mouth every other day, Disp: 90 tablet, Rfl: 1    furosemide (LASIX) 40 mg tablet, TAKE 1 TABLET DAILY, Disp: 90 tablet, Rfl: 3    gabapentin (NEURONTIN) 300 mg capsule, Take 2 capsules (600 mg total) by mouth 3 (three) times a day, Disp: 540 capsule, Rfl: 1    glimepiride (AMARYL) 4 mg tablet, TAKE 1 TABLET TWICE A DAY, Disp: 120 tablet, Rfl: 5    Jardiance 25 MG TABS, TAKE 1 TABLET DAILY, Disp: 90 tablet, Rfl: 3    Lancets (OneTouch Delica Plus [id number]) MISC, USE THREE TIMES A DAY, Disp: 300 each, Rfl: 2    loratadine (CLARITIN) 10 mg tablet, TAKE 1 TABLET BY MOUTH EVERY DAY, Disp: 90 tablet, Rfl: 1    metFORMIN (GLUCOPHAGE) 500 mg tablet, TAKE 1 TABLET THREE TIMES A DAY, Disp: 270 tablet, Rfl: 1    MULTIPLE  "VITAMIN PO, Take 1 tablet by mouth, Disp: , Rfl:     pantoprazole (PROTONIX) 40 mg tablet, TAKE 1 TABLET BY MOUTH EVERY DAY, Disp: 90 tablet, Rfl: 1    sodium chloride (EAMON 128) 5 % hypertonic ophthalmic solution, Administer 1 drop to both eyes every evening, Disp: , Rfl:     traMADol (ULTRAM) 50 mg tablet, Take 1 tablet (50 mg total) by mouth daily as needed for severe pain, Disp: 10 tablet, Rfl: 0    Insulin Glargine Solostar (Lantus SoloStar) 100 UNIT/ML SOPN, Inject 0.3 mL (30 Units total) under the skin daily at bedtime, Disp: , Rfl:   Allergies   Allergen Reactions    Crab (Diagnostic) - Food Allergy Anaphylaxis    Bee Pollen Other (See Comments)     Other reaction(s): Other (See Comments)    Pollen Extract Other (See Comments)       Objective   /74 (BP Location: Left arm, Patient Position: Sitting, Cuff Size: Standard)   Pulse 82   Temp (!) 97.2 °F (36.2 °C) (Temporal)   Resp 15   Ht 4' 10\" (1.473 m)   Wt 64 kg (141 lb)   SpO2 98%   BMI 29.47 kg/m²    Physical Exam  Vitals reviewed.   HENT:      Head: Normocephalic.   Cardiovascular:      Rate and Rhythm: Normal rate and regular rhythm.   Pulmonary:      Effort: Pulmonary effort is normal.      Breath sounds: Normal breath sounds.   Abdominal:      Palpations: Abdomen is soft.      Tenderness: There is no abdominal tenderness.   Musculoskeletal:      Cervical back: Neck supple.   Skin:     Findings: No rash.   Neurological:      Mental Status: She is alert.         Result Review  Labs:  Appointment on 06/26/2024   Component Date Value Ref Range Status    Hemoglobin A1C 06/26/2024 7.8 (H)  Normal 4.0-5.6%; PreDiabetic 5.7-6.4%; Diabetic >=6.5%; Glycemic control for adults with diabetes <7.0% % Final    EAG 06/26/2024 177  mg/dl Final    WBC 06/26/2024 5.66  4.31 - 10.16 Thousand/uL Final    RBC 06/26/2024 3.88  3.81 - 5.12 Million/uL Final    Hemoglobin 06/26/2024 9.7 (L)  11.5 - 15.4 g/dL Final    Hematocrit 06/26/2024 31.8 (L)  34.8 - 46.1 % " Final    MCV 06/26/2024 82  82 - 98 fL Final    MCH 06/26/2024 25.0 (L)  26.8 - 34.3 pg Final    MCHC 06/26/2024 30.5 (L)  31.4 - 37.4 g/dL Final    RDW 06/26/2024 19.0 (H)  11.6 - 15.1 % Final    MPV 06/26/2024 10.4  8.9 - 12.7 fL Final    Platelets 06/26/2024 153  149 - 390 Thousands/uL Final    nRBC 06/26/2024 0  /100 WBCs Final    Segmented % 06/26/2024 71  43 - 75 % Final    Immature Grans % 06/26/2024 0  0 - 2 % Final    Lymphocytes % 06/26/2024 10 (L)  14 - 44 % Final    Monocytes % 06/26/2024 7  4 - 12 % Final    Eosinophils Relative 06/26/2024 11 (H)  0 - 6 % Final    Basophils Relative 06/26/2024 1  0 - 1 % Final    Absolute Neutrophils 06/26/2024 4.03  1.85 - 7.62 Thousands/µL Final    Absolute Immature Grans 06/26/2024 0.02  0.00 - 0.20 Thousand/uL Final    Absolute Lymphocytes 06/26/2024 0.59 (L)  0.60 - 4.47 Thousands/µL Final    Absolute Monocytes 06/26/2024 0.38  0.17 - 1.22 Thousand/µL Final    Eosinophils Absolute 06/26/2024 0.60  0.00 - 0.61 Thousand/µL Final    Basophils Absolute 06/26/2024 0.04  0.00 - 0.10 Thousands/µL Final    Creatinine, Ur 06/26/2024 65.1  Reference range not established. mg/dL Final    Albumin,U,Random 06/26/2024 14.7  <20.0 mg/L Final    Albumin Creat Ratio 06/26/2024 23  0 - 30 mg/g creatinine Final    Sodium 06/26/2024 140  135 - 147 mmol/L Final    Potassium 06/26/2024 4.5  3.5 - 5.3 mmol/L Final    Chloride 06/26/2024 104  96 - 108 mmol/L Final    CO2 06/26/2024 29  21 - 32 mmol/L Final    ANION GAP 06/26/2024 7  4 - 13 mmol/L Final    BUN 06/26/2024 21  5 - 25 mg/dL Final    Creatinine 06/26/2024 1.19  0.60 - 1.30 mg/dL Final    Standardized to IDMS reference method    Glucose, Fasting 06/26/2024 127 (H)  65 - 99 mg/dL Final    Calcium 06/26/2024 8.5  8.4 - 10.2 mg/dL Final    Corrected Calcium 06/26/2024 9.1  8.3 - 10.1 mg/dL Final    AST 06/26/2024 18  13 - 39 U/L Final    ALT 06/26/2024 15  7 - 52 U/L Final    Specimen collection should occur prior to Sulfasalazine  administration due to the potential for falsely depressed results.     Alkaline Phosphatase 06/26/2024 54  34 - 104 U/L Final    Total Protein 06/26/2024 6.2 (L)  6.4 - 8.4 g/dL Final    Albumin 06/26/2024 3.3 (L)  3.5 - 5.0 g/dL Final    Total Bilirubin 06/26/2024 0.33  0.20 - 1.00 mg/dL Final    Use of this assay is not recommended for patients undergoing treatment with eltrombopag due to the potential for falsely elevated results.  N-acetyl-p-benzoquinone imine (metabolite of Acetaminophen) will generate erroneously low results in samples for patients that have taken an overdose of Acetaminophen.    eGFR 06/26/2024 45  ml/min/1.73sq m Final    Iron Saturation 06/26/2024 8 (L)  15 - 50 % Final    TIBC 06/26/2024 320  250 - 450 ug/dL Final    Iron 06/26/2024 25 (L)  50 - 212 ug/dL Final    Patients treated with metal-binding drugs (ie. Deferoxamine) may have depressed iron values.    UIBC 06/26/2024 295  155 - 355 ug/dL Final    Ferritin 06/26/2024 22  11 - 307 ng/mL Final       Imaging:   I reviewed relevant imaging    Please note:  This report has been generated by a voice recognition software system. Therefore there may be syntax, spelling, and/or grammatical errors. Please call if you have any questions.

## 2024-07-24 DIAGNOSIS — E11.9 DIABETES MELLITUS TYPE 2 IN NONOBESE (HCC): ICD-10-CM

## 2024-07-24 DIAGNOSIS — K70.30 ALCOHOLIC CIRRHOSIS OF LIVER WITHOUT ASCITES (HCC): ICD-10-CM

## 2024-07-24 RX ORDER — FUROSEMIDE 40 MG/1
40 TABLET ORAL DAILY
Qty: 100 TABLET | Refills: 1 | Status: SHIPPED | OUTPATIENT
Start: 2024-07-24

## 2024-07-24 RX ORDER — GLIMEPIRIDE 4 MG/1
4 TABLET ORAL 2 TIMES DAILY
Qty: 200 TABLET | Refills: 1 | Status: SHIPPED | OUTPATIENT
Start: 2024-07-24

## 2024-08-06 ENCOUNTER — TELEPHONE (OUTPATIENT)
Age: 74
End: 2024-08-06

## 2024-08-06 NOTE — TELEPHONE ENCOUNTER
Patient states she missed a call. Made aware I do not see anyone called from Nephrology. Patient states she has an endoscopy scheduled on Monday. States she will call Gastroenterology.

## 2024-08-09 ENCOUNTER — TELEPHONE (OUTPATIENT)
Dept: GASTROENTEROLOGY | Facility: CLINIC | Age: 74
End: 2024-08-09

## 2024-08-12 ENCOUNTER — ANESTHESIA EVENT (OUTPATIENT)
Dept: GASTROENTEROLOGY | Facility: HOSPITAL | Age: 74
End: 2024-08-12

## 2024-08-12 ENCOUNTER — HOSPITAL ENCOUNTER (OUTPATIENT)
Dept: GASTROENTEROLOGY | Facility: HOSPITAL | Age: 74
Setting detail: OUTPATIENT SURGERY
Discharge: HOME | End: 2024-08-12
Attending: INTERNAL MEDICINE
Payer: COMMERCIAL

## 2024-08-12 ENCOUNTER — ANESTHESIA (OUTPATIENT)
Dept: GASTROENTEROLOGY | Facility: HOSPITAL | Age: 74
End: 2024-08-12

## 2024-08-12 VITALS
SYSTOLIC BLOOD PRESSURE: 139 MMHG | DIASTOLIC BLOOD PRESSURE: 70 MMHG | RESPIRATION RATE: 21 BRPM | HEIGHT: 58 IN | TEMPERATURE: 97 F | WEIGHT: 138.89 LBS | HEART RATE: 69 BPM | OXYGEN SATURATION: 93 % | BODY MASS INDEX: 29.15 KG/M2

## 2024-08-12 DIAGNOSIS — I85.11 SECONDARY ESOPHAGEAL VARICES WITH BLEEDING (HCC): ICD-10-CM

## 2024-08-12 LAB — GLUCOSE SERPL-MCNC: 128 MG/DL (ref 65–140)

## 2024-08-12 PROCEDURE — 88313 SPECIAL STAINS GROUP 2: CPT | Performed by: STUDENT IN AN ORGANIZED HEALTH CARE EDUCATION/TRAINING PROGRAM

## 2024-08-12 PROCEDURE — 88342 IMHCHEM/IMCYTCHM 1ST ANTB: CPT | Performed by: STUDENT IN AN ORGANIZED HEALTH CARE EDUCATION/TRAINING PROGRAM

## 2024-08-12 PROCEDURE — 82948 REAGENT STRIP/BLOOD GLUCOSE: CPT

## 2024-08-12 PROCEDURE — 88341 IMHCHEM/IMCYTCHM EA ADD ANTB: CPT | Performed by: STUDENT IN AN ORGANIZED HEALTH CARE EDUCATION/TRAINING PROGRAM

## 2024-08-12 PROCEDURE — 43239 EGD BIOPSY SINGLE/MULTIPLE: CPT | Performed by: INTERNAL MEDICINE

## 2024-08-12 PROCEDURE — 43251 EGD REMOVE LESION SNARE: CPT | Performed by: INTERNAL MEDICINE

## 2024-08-12 PROCEDURE — 88305 TISSUE EXAM BY PATHOLOGIST: CPT | Performed by: STUDENT IN AN ORGANIZED HEALTH CARE EDUCATION/TRAINING PROGRAM

## 2024-08-12 RX ORDER — PROPOFOL 10 MG/ML
INJECTION, EMULSION INTRAVENOUS AS NEEDED
Status: DISCONTINUED | OUTPATIENT
Start: 2024-08-12 | End: 2024-08-12

## 2024-08-12 RX ORDER — LIDOCAINE HYDROCHLORIDE 20 MG/ML
INJECTION, SOLUTION EPIDURAL; INFILTRATION; INTRACAUDAL; PERINEURAL AS NEEDED
Status: DISCONTINUED | OUTPATIENT
Start: 2024-08-12 | End: 2024-08-12

## 2024-08-12 RX ORDER — SODIUM CHLORIDE, SODIUM LACTATE, POTASSIUM CHLORIDE, CALCIUM CHLORIDE 600; 310; 30; 20 MG/100ML; MG/100ML; MG/100ML; MG/100ML
125 INJECTION, SOLUTION INTRAVENOUS CONTINUOUS
Status: DISCONTINUED | OUTPATIENT
Start: 2024-08-12 | End: 2024-08-16 | Stop reason: HOSPADM

## 2024-08-12 RX ADMIN — LIDOCAINE HYDROCHLORIDE 100 MG: 20 INJECTION, SOLUTION EPIDURAL; INFILTRATION; INTRACAUDAL; PERINEURAL at 07:46

## 2024-08-12 RX ADMIN — PROPOFOL 25 MG: 10 INJECTION, EMULSION INTRAVENOUS at 07:51

## 2024-08-12 RX ADMIN — PROPOFOL 25 MG: 10 INJECTION, EMULSION INTRAVENOUS at 07:58

## 2024-08-12 RX ADMIN — PROPOFOL 25 MG: 10 INJECTION, EMULSION INTRAVENOUS at 08:00

## 2024-08-12 RX ADMIN — PROPOFOL 20 MG: 10 INJECTION, EMULSION INTRAVENOUS at 08:04

## 2024-08-12 RX ADMIN — PROPOFOL 75 MG: 10 INJECTION, EMULSION INTRAVENOUS at 07:48

## 2024-08-12 RX ADMIN — SODIUM CHLORIDE, SODIUM LACTATE, POTASSIUM CHLORIDE, AND CALCIUM CHLORIDE: .6; .31; .03; .02 INJECTION, SOLUTION INTRAVENOUS at 07:50

## 2024-08-12 RX ADMIN — PROPOFOL 75 MG: 10 INJECTION, EMULSION INTRAVENOUS at 07:47

## 2024-08-12 RX ADMIN — PROPOFOL 25 MG: 10 INJECTION, EMULSION INTRAVENOUS at 07:55

## 2024-08-12 NOTE — ANESTHESIA POSTPROCEDURE EVALUATION
Post-Op Assessment Note    CV Status:  Stable  Pain Score: 0    Pain management: adequate       Mental Status:  Sleepy   Hydration Status:  Stable   PONV Controlled:  None   Airway Patency:  Patent  Two or more mitigation strategies used for obstructive sleep apnea   Post Op Vitals Reviewed: Yes    No anethesia notable event occurred.    Staff: Anesthesiologist               /69 (08/12/24 0811)    Temp (!) 97 °F (36.1 °C) (08/12/24 0811)    Pulse 67 (08/12/24 0811)   Resp (!) 10 (08/12/24 0811)    SpO2 97 % (08/12/24 0811)

## 2024-08-12 NOTE — TELEPHONE ENCOUNTER
"Reason for Disposition  • Endoscopy, what to expect afterwards, questions about    Answer Assessment - Initial Assessment Questions  1. D/ATE/TIME: \"When did you have your endoscopy?\"       8/12/24  2. MAIN CONCERN: \"What is your main concern right now?\" \"What questions do you have?\"      Loose stools x 3  3. ADOMINAL PAIN: \"Are you having any abdominal (belly or stomach) pain?\" If Yes, ask: \"How bad is it?\" (e.g., Scale 1-10; mild, moderate, severe).     - MILD (1-3): doesn't interfere with normal activities, abdomen soft and not tender to touch      - MODERATE (4-7): interferes with normal activities or awakens from sleep, tender to touch      - SEVERE (8-10): excruciating pain, doubled over, unable to do any normal activities        Denies  4. OTHER SYMPTOMS: \"What other symptoms are you having?\" (e.g., breathing or swallowing problems, chest pain, throat pain, hoarseness, vomiting, stomach pain, bloating, fever, dizziness)      Denies  5. ONSET: \"When did your symptoms start?\"      After procedure  6. PATTERN: \"Is the symptom(s) constant or does it come and go?\" \"Is your symptom(s) getting worse, better, or staying the same?\"      Same    Protocols used: Endoscopy (Upper GI) Symptoms and Questions-ADULT-AH    "

## 2024-08-12 NOTE — H&P
"History and Physical -  [facility]  Iman Walden 74 y.o. female MRN: [id number]      HPI: Iman Walden is a 74 y.o. year old female who presents for the evaluation of melena to rule out an upper GI source      REVIEW OF SYSTEMS: Per the HPI, and otherwise unremarkable.    Historical Information   Past Medical History:   Diagnosis Date    Acute blood loss anemia 02/20/2021    Acute embolism and thrombosis of other specified deep vein of right lower extremity (HCC) 11/16/2022    ALC (alcoholic liver cirrhosis) (HCC)     Chronic kidney disease     Colon polyp     Diabetes mellitus (HCC)     Edema 02/23/2021    HLD (hyperlipidemia)     Hypertension     Liver disease     Neuropathy     Neuropathy 02/20/2021    Pleural effusion 02/20/2021    Seasonal allergies     Thrombocytopenia (HCC)      Past Surgical History:   Procedure Laterality Date    BREAST BIOPSY      CHOLECYSTECTOMY      NASAL SEPTUM SURGERY       Social History   Social History     Substance and Sexual Activity   Alcohol Use Never     Social History     Substance and Sexual Activity   Drug Use Yes    Frequency: 7.0 times per week    Types: Marijuana    Comment:  unknown     Social History     Tobacco Use   Smoking Status Never    Passive exposure: Never   Smokeless Tobacco Never     Family History   Problem Relation Age of Onset    Emphysema Mother     Heart attack Father     Heart disease Father        Meds/Allergies     Not in a hospital admission.    Allergies   Allergen Reactions    Crab (Diagnostic) - Food Allergy Anaphylaxis    Bee Pollen Other (See Comments)     Other reaction(s): Other (See Comments)    Pollen Extract Other (See Comments)       Objective     Blood pressure 161/73, pulse 66, temperature (!) 97.3 °F (36.3 °C), temperature source Temporal, resp. rate 15, height 4' 10\" (1.473 m), weight 63 kg (138 lb 14.2 oz), SpO2 99%.      PHYSICAL EXAM    Gen: NAD  CV: RRR  CHEST: Clear  ABD: " soft, NT/ND  EXT: no edema      ASSESSMENT/PLAN:  This is a 74 y.o. year old female here for upper endoscopy, and she is stable and optimized for her procedure.

## 2024-08-12 NOTE — ANESTHESIA PREPROCEDURE EVALUATION
"Procedure:  EGD    Took coreg this morning  Last jardiance on 8/7  Daily marijuana use      Relevant Problems   CARDIO   (+) Hypertension associated with stage 3b chronic kidney disease due to type 2 diabetes mellitus (HCC)   (+) Hypertension secondary to other renal disorders   (+) Mixed hyperlipidemia   (+) Secondary esophageal varices with bleeding (HCC)      ENDO   (+) Diabetes mellitus type 2 in nonobese (HCC)      GI/HEPATIC   (+) Alcoholic cirrhosis of liver without ascites (HCC)      /RENAL   (+) Hypertension associated with stage 3b chronic kidney disease due to type 2 diabetes mellitus (HCC)   (+) Stage 3 chronic kidney disease, unspecified whether stage 3a or 3b CKD (HCC)      NEURO/PSYCH   (+) Continuous opioid dependence (HCC)   (+) Diabetic peripheral neuropathy (HCC)      Orthopedic/Musculoskeletal   (+) Compression fracture of lumbar vertebra (HCC)        Physical Exam    Airway    Mallampati score: II  TM Distance: >3 FB  Neck ROM: full     Dental        Cardiovascular      Pulmonary      Other Findings  post-pubertal.      Anesthesia Plan  ASA Score- 3     Anesthesia Type- IV sedation with anesthesia with ASA Monitors.         Additional Monitors:     Airway Plan:     Comment: Recent labs personally reviewed:  Lab Results       Component                Value               Date                       WBC                      5.66                06/26/2024                 HGB                      9.7 (L)             06/26/2024                 PLT                      153                 06/26/2024            Lab Results       Component                Value               Date                       K                        4.5                 06/26/2024                 BUN                      21                  06/26/2024                 CREATININE               1.19                06/26/2024            No results found for: \"PTT\"   Lab Results       Component                Value               Date "                       INR                      1.1                 09/18/2022              Blood type     I, Melissa Augustine MD, have personally seen and evaluated the patient prior to anesthetic care.  I have reviewed the pre-anesthetic record, medical history, allergies, medications and any other medical records if appropriate to the anesthetic care.  If a CRNA is involved in the case, I have reviewed the CRNA assessment, if present, and agree. Patient consented for IV Sedation, general anesthesia as back up. Discussed risks of aspiration, IV infiltration, indications for conversion to general anesthesia. All questions and concerns addressed.     .       Plan Factors-Exercise tolerance (METS): >4 METS.    Chart reviewed.   Existing labs reviewed. Patient summary reviewed.    Patient is a current smoker.  Patient instructed to abstain from smoking on day of procedure. Patient did not smoke on day of surgery.    Obstructive sleep apnea risk education given perioperatively.        Induction- intravenous.    Postoperative Plan-         Informed Consent- Anesthetic plan and risks discussed with patient.  I personally reviewed this patient with the CRNA. Discussed and agreed on the Anesthesia Plan with the CRNA..

## 2024-08-12 NOTE — TELEPHONE ENCOUNTER
"States she's had 3 very loose stools with \"oily residue on toilet paper\" after procedure. Denies nausea/vomiting/blood in stools/abdominal pain/fever. Pt able to eat an drink. Pt to monitor. Advised electrolytes and BRAT in the interim. Can try imodium if no improvement.    Reviewed alarm symptoms that necessitate ED evaluation. Pt verbalized understanding.    Answer Assessment - Initial Assessment Questions  1. D/ATE/TIME: \"When did you have your endoscopy?\"       8/12/24  2. MAIN CONCERN: \"What is your main concern right now?\" \"What questions do you have?\"      Loose stools x 3  3. ADOMINAL PAIN: \"Are you having any abdominal (belly or stomach) pain?\" If Yes, ask: \"How bad is it?\" (e.g., Scale 1-10; mild, moderate, severe).     - MILD (1-3): doesn't interfere with normal activities, abdomen soft and not tender to touch      - MODERATE (4-7): interferes with normal activities or awakens from sleep, tender to touch      - SEVERE (8-10): excruciating pain, doubled over, unable to do any normal activities        Denies  4. OTHER SYMPTOMS: \"What other symptoms are you having?\" (e.g., breathing or swallowing problems, chest pain, throat pain, hoarseness, vomiting, stomach pain, bloating, fever, dizziness)      Denies  5. ONSET: \"When did your symptoms start?\"      After procedure  6. PATTERN: \"Is the symptom(s) constant or does it come and go?\" \"Is your symptom(s) getting worse, better, or staying the same?\"      Same    Protocols used: Endoscopy (Upper GI) Symptoms and Questions-ADULT-AH    "

## 2024-08-15 PROCEDURE — 88342 IMHCHEM/IMCYTCHM 1ST ANTB: CPT | Performed by: STUDENT IN AN ORGANIZED HEALTH CARE EDUCATION/TRAINING PROGRAM

## 2024-08-15 PROCEDURE — 88341 IMHCHEM/IMCYTCHM EA ADD ANTB: CPT | Performed by: STUDENT IN AN ORGANIZED HEALTH CARE EDUCATION/TRAINING PROGRAM

## 2024-08-15 PROCEDURE — 88313 SPECIAL STAINS GROUP 2: CPT | Performed by: STUDENT IN AN ORGANIZED HEALTH CARE EDUCATION/TRAINING PROGRAM

## 2024-08-15 PROCEDURE — 88305 TISSUE EXAM BY PATHOLOGIST: CPT | Performed by: STUDENT IN AN ORGANIZED HEALTH CARE EDUCATION/TRAINING PROGRAM

## 2024-08-16 ENCOUNTER — TELEPHONE (OUTPATIENT)
Dept: GASTROENTEROLOGY | Facility: CLINIC | Age: 74
End: 2024-08-16

## 2024-08-16 NOTE — TELEPHONE ENCOUNTER
----- Message from Azael Goncalves DO sent at 8/16/2024  8:02 AM EDT -----  Please call the patient with the polyp results.  The polyps of the stomach turned out to be benign polyp.  This is good news.  There does not appear to be an indication for repeating an endoscopy anytime soon.  I would like to repeat the upper endoscopy in 1 years time.    The biopsies of the small intestine were benign and negative for celiac disease or cancer.

## 2024-08-16 NOTE — TELEPHONE ENCOUNTER
Called pt and relayed results.  Pt voiced understanding and asked if Dr. Goncalves stopped her inside bleeding.    Please advise.  Thank You!

## 2024-08-19 ENCOUNTER — PREP FOR PROCEDURE (OUTPATIENT)
Dept: GASTROENTEROLOGY | Facility: CLINIC | Age: 74
End: 2024-08-19

## 2024-08-19 ENCOUNTER — TELEPHONE (OUTPATIENT)
Age: 74
End: 2024-08-19

## 2024-08-19 ENCOUNTER — TELEPHONE (OUTPATIENT)
Dept: NEPHROLOGY | Facility: CLINIC | Age: 74
End: 2024-08-19

## 2024-08-19 DIAGNOSIS — I85.11 SECONDARY ESOPHAGEAL VARICES WITH BLEEDING (HCC): Primary | ICD-10-CM

## 2024-08-19 NOTE — TELEPHONE ENCOUNTER
Called and schedule Iman's Egd  10/15/24  with Dr Goncalves  Reviewed prep Instructions  Hold Jardiance 4 Days prior

## 2024-08-26 DIAGNOSIS — K70.30 ALCOHOLIC CIRRHOSIS OF LIVER WITHOUT ASCITES (HCC): ICD-10-CM

## 2024-08-26 DIAGNOSIS — F10.11 HISTORY OF ALCOHOL ABUSE: ICD-10-CM

## 2024-08-27 RX ORDER — PANTOPRAZOLE SODIUM 40 MG/1
40 TABLET, DELAYED RELEASE ORAL DAILY
Qty: 90 TABLET | Refills: 1 | Status: SHIPPED | OUTPATIENT
Start: 2024-08-27

## 2024-08-28 ENCOUNTER — LAB (OUTPATIENT)
Age: 74
End: 2024-08-28
Payer: COMMERCIAL

## 2024-08-28 ENCOUNTER — TELEPHONE (OUTPATIENT)
Dept: FAMILY MEDICINE CLINIC | Facility: CLINIC | Age: 74
End: 2024-08-28

## 2024-08-28 DIAGNOSIS — D50.0 IRON DEFICIENCY ANEMIA DUE TO CHRONIC BLOOD LOSS: ICD-10-CM

## 2024-08-28 DIAGNOSIS — E11.9 DIABETES MELLITUS TYPE 2 IN NONOBESE (HCC): ICD-10-CM

## 2024-08-28 DIAGNOSIS — N18.32 HYPERTENSION ASSOCIATED WITH STAGE 3B CHRONIC KIDNEY DISEASE DUE TO TYPE 2 DIABETES MELLITUS (HCC): ICD-10-CM

## 2024-08-28 DIAGNOSIS — I12.9 HYPERTENSION ASSOCIATED WITH STAGE 3B CHRONIC KIDNEY DISEASE DUE TO TYPE 2 DIABETES MELLITUS (HCC): ICD-10-CM

## 2024-08-28 DIAGNOSIS — N18.32 CKD STAGE 3B, GFR 30-44 ML/MIN (HCC): ICD-10-CM

## 2024-08-28 DIAGNOSIS — N18.30 STAGE 3 CHRONIC KIDNEY DISEASE, UNSPECIFIED WHETHER STAGE 3A OR 3B CKD (HCC): ICD-10-CM

## 2024-08-28 DIAGNOSIS — E11.22 HYPERTENSION ASSOCIATED WITH STAGE 3B CHRONIC KIDNEY DISEASE DUE TO TYPE 2 DIABETES MELLITUS (HCC): ICD-10-CM

## 2024-08-28 LAB
25(OH)D3 SERPL-MCNC: 64.7 NG/ML (ref 30–100)
ALBUMIN SERPL BCG-MCNC: 3.6 G/DL (ref 3.5–5)
ALP SERPL-CCNC: 55 U/L (ref 34–104)
ALT SERPL W P-5'-P-CCNC: 17 U/L (ref 7–52)
ANION GAP SERPL CALCULATED.3IONS-SCNC: 12 MMOL/L (ref 4–13)
AST SERPL W P-5'-P-CCNC: 20 U/L (ref 13–39)
BACTERIA UR QL AUTO: ABNORMAL /HPF
BASOPHILS # BLD AUTO: 0.08 THOUSANDS/ÂΜL (ref 0–0.1)
BASOPHILS NFR BLD AUTO: 1 % (ref 0–1)
BILIRUB SERPL-MCNC: 0.42 MG/DL (ref 0.2–1)
BILIRUB UR QL STRIP: NEGATIVE
BUN SERPL-MCNC: 26 MG/DL (ref 5–25)
CALCIUM SERPL-MCNC: 8.8 MG/DL (ref 8.4–10.2)
CHLORIDE SERPL-SCNC: 102 MMOL/L (ref 96–108)
CLARITY UR: CLEAR
CO2 SERPL-SCNC: 28 MMOL/L (ref 21–32)
COLOR UR: YELLOW
CREAT SERPL-MCNC: 1.3 MG/DL (ref 0.6–1.3)
CREAT UR-MCNC: 58.8 MG/DL
EOSINOPHIL # BLD AUTO: 1 THOUSAND/ÂΜL (ref 0–0.61)
EOSINOPHIL NFR BLD AUTO: 12 % (ref 0–6)
ERYTHROCYTE [DISTWIDTH] IN BLOOD BY AUTOMATED COUNT: 15.6 % (ref 11.6–15.1)
EST. AVERAGE GLUCOSE BLD GHB EST-MCNC: 183 MG/DL
GFR SERPL CREATININE-BSD FRML MDRD: 41 ML/MIN/1.73SQ M
GLUCOSE P FAST SERPL-MCNC: 32 MG/DL (ref 65–99)
GLUCOSE UR STRIP-MCNC: ABNORMAL MG/DL
HBA1C MFR BLD: 8 %
HCT VFR BLD AUTO: 35.2 % (ref 34.8–46.1)
HGB BLD-MCNC: 11.3 G/DL (ref 11.5–15.4)
HGB UR QL STRIP.AUTO: NEGATIVE
IMM GRANULOCYTES # BLD AUTO: 0.04 THOUSAND/UL (ref 0–0.2)
IMM GRANULOCYTES NFR BLD AUTO: 1 % (ref 0–2)
KETONES UR STRIP-MCNC: NEGATIVE MG/DL
LEUKOCYTE ESTERASE UR QL STRIP: ABNORMAL
LYMPHOCYTES # BLD AUTO: 1.06 THOUSANDS/ÂΜL (ref 0.6–4.47)
LYMPHOCYTES NFR BLD AUTO: 13 % (ref 14–44)
MCH RBC QN AUTO: 27.5 PG (ref 26.8–34.3)
MCHC RBC AUTO-ENTMCNC: 32.1 G/DL (ref 31.4–37.4)
MCV RBC AUTO: 86 FL (ref 82–98)
MICROALBUMIN UR-MCNC: <7 MG/L
MONOCYTES # BLD AUTO: 0.63 THOUSAND/ÂΜL (ref 0.17–1.22)
MONOCYTES NFR BLD AUTO: 8 % (ref 4–12)
NEUTROPHILS # BLD AUTO: 5.42 THOUSANDS/ÂΜL (ref 1.85–7.62)
NEUTS SEG NFR BLD AUTO: 65 % (ref 43–75)
NITRITE UR QL STRIP: NEGATIVE
NON-SQ EPI CELLS URNS QL MICRO: ABNORMAL /HPF
NRBC BLD AUTO-RTO: 0 /100 WBCS
PH UR STRIP.AUTO: 5.5 [PH]
PHOSPHATE SERPL-MCNC: 3.7 MG/DL (ref 2.3–4.1)
PLATELET # BLD AUTO: 209 THOUSANDS/UL (ref 149–390)
PMV BLD AUTO: 10 FL (ref 8.9–12.7)
POTASSIUM SERPL-SCNC: 4.2 MMOL/L (ref 3.5–5.3)
PROT SERPL-MCNC: 6.4 G/DL (ref 6.4–8.4)
PROT UR STRIP-MCNC: NEGATIVE MG/DL
PTH-INTACT SERPL-MCNC: 174.5 PG/ML (ref 12–88)
RBC # BLD AUTO: 4.11 MILLION/UL (ref 3.81–5.12)
RBC #/AREA URNS AUTO: ABNORMAL /HPF
RENAL EPI CELLS #/AREA URNS HPF: PRESENT /[HPF]
SODIUM SERPL-SCNC: 142 MMOL/L (ref 135–147)
SP GR UR STRIP.AUTO: 1.01 (ref 1–1.03)
TRANS CELLS #/AREA URNS HPF: PRESENT /[HPF]
URATE SERPL-MCNC: 7.9 MG/DL (ref 2–7.5)
UROBILINOGEN UR STRIP-ACNC: <2 MG/DL
WBC # BLD AUTO: 8.23 THOUSAND/UL (ref 4.31–10.16)
WBC #/AREA URNS AUTO: ABNORMAL /HPF

## 2024-08-28 PROCEDURE — 83036 HEMOGLOBIN GLYCOSYLATED A1C: CPT

## 2024-08-28 PROCEDURE — 85025 COMPLETE CBC W/AUTO DIFF WBC: CPT

## 2024-08-28 PROCEDURE — 84100 ASSAY OF PHOSPHORUS: CPT

## 2024-08-28 PROCEDURE — 84550 ASSAY OF BLOOD/URIC ACID: CPT

## 2024-08-28 PROCEDURE — 83970 ASSAY OF PARATHORMONE: CPT

## 2024-08-28 PROCEDURE — 82570 ASSAY OF URINE CREATININE: CPT

## 2024-08-28 PROCEDURE — 82043 UR ALBUMIN QUANTITATIVE: CPT

## 2024-08-28 PROCEDURE — 82306 VITAMIN D 25 HYDROXY: CPT

## 2024-08-28 PROCEDURE — 81001 URINALYSIS AUTO W/SCOPE: CPT

## 2024-08-28 PROCEDURE — 36415 COLL VENOUS BLD VENIPUNCTURE: CPT

## 2024-08-28 PROCEDURE — 3052F HG A1C>EQUAL 8.0%<EQUAL 9.0%: CPT | Performed by: PHYSICIAN ASSISTANT

## 2024-08-28 PROCEDURE — 80053 COMPREHEN METABOLIC PANEL: CPT

## 2024-08-28 NOTE — PROGRESS NOTES
Received text about patient's critical glucose reading of 32.  Contacted and spoke with patient.  States she has not eaten today or taking any medications and does report feeling very fatigued and having blurry vision and changes in color distortion when she had the lab work done.  She stopped for ice cream twice and said quickly ate and took a nap and is feeling better.  On the phone with her her sugar is 390 currently.  Symptoms have also resolved at this time.  Recommend that she hold her glipizide, recheck her blood glucose 2 hours after dinner and if it is not less than 100 can use the insulin.  If it is less than 50 or 60 would recommend ER.  Patient verbalized understanding and agreement

## 2024-08-29 NOTE — TELEPHONE ENCOUNTER
Received call from the lab regarding critical glucose level of 32.  Called patient's  Azael spoke with him reports patient did not eat was fasting when the blood was drawn.  Ate ice cream and Wheaties on the way home.  Currently resting will recheck blood sugar.  Called back patient's  in 15 minutes states that Dr. Lorenzo called and spoke with Iman Sherman blood sugar was 390 on recheck.  Advised to follow guidelines recommended by Dr. Lorenzo.

## 2024-08-29 NOTE — TELEPHONE ENCOUNTER
Pt is calling to report her BS: 143 in the morning. Pt states PCP instructed not to take her medication (glimepiride (AMARYL) 4 mg tablet).     Pt stated she wont take it anymore. Pt stated she would like to know if she can stop taking her shot as well every night. She is tired of doing that injection.   PLEASE CALL PT AND FURTHER ADVISE ON THIS.

## 2024-09-01 DIAGNOSIS — E11.42 DIABETIC PERIPHERAL NEUROPATHY (HCC): ICD-10-CM

## 2024-09-01 DIAGNOSIS — F11.20 CONTINUOUS OPIOID DEPENDENCE (HCC): ICD-10-CM

## 2024-09-03 ENCOUNTER — TELEPHONE (OUTPATIENT)
Dept: NEPHROLOGY | Facility: CLINIC | Age: 74
End: 2024-09-03

## 2024-09-03 DIAGNOSIS — E11.9 DIABETES MELLITUS TYPE 2 IN NONOBESE (HCC): ICD-10-CM

## 2024-09-03 RX ORDER — TRAMADOL HYDROCHLORIDE 50 MG/1
50 TABLET ORAL DAILY PRN
Qty: 10 TABLET | Refills: 0 | Status: SHIPPED | OUTPATIENT
Start: 2024-09-03

## 2024-09-03 NOTE — TELEPHONE ENCOUNTER
Called spoke with patient advised we have opening for today at 4:00pm with KRYSTLE Zhao. advised if she will like to come in. Patient refused and will like to keep her scheduled follow-up appointment for 09/04/24 @ 12pm.

## 2024-09-03 NOTE — PROGRESS NOTES
NEPHROLOGY OFFICE NOTE    Patient: Iman Walden               Sex: female           YOB: 1950        Age:  74 y.o.       9/4/2024      BACKGROUND     I had the pleasure of seeing Iman Walden in the nephrology office today for follow-up. She has a past medical history significant for alcoholic cirrhosis of the liver, secondary esophageal varices, type 2 diabetes with neuropathy, and CKD stage 3.     She has underlying type 2 diabetes and maintained on metformin, Jardiance, glimepiride, and insulin. She is maintained on gabapentin for management of neuropathy.      She has underlying hypertension and maintained on carvedilol.      She has a history of CKD stage 3, previously followed with Dr. Hartley with [facility] and has transitioned care to our office.      She has underling alcoholic cirrhosis and following with Gastroenterology.  She had required 2 paracentesis in the past but recently has had no issues with abdominal ascites.  She is maintained on furosemide 40 mg daily.    She is following with Hematology for management of iron deficiency anemia with underlying history of bleeding esophageal varices. She is maintained on oral iron supplementation, refused IV infusions in the past. Recent EGD on 8/19/2024 and reports she has polyps with scheduled follow-up EGD in October.     Most recent labs obtained on 8/28/2024, which we have reviewed together.     SUBJECTIVE     She currently has no complaints at this time and is feeling well. Patient denies any chest pain, shortness of breath, or swelling.    REVIEW OF SYSTEMS     Review of Systems   Constitutional:  Negative for activity change, chills, fatigue and fever.   HENT:  Negative for trouble swallowing.    Respiratory:  Negative for shortness of breath.    Cardiovascular:  Negative for chest pain and leg swelling.   Gastrointestinal:  Negative for nausea and vomiting.   Genitourinary:   Negative for difficulty urinating, dysuria, frequency and hematuria.   Musculoskeletal:  Negative for back pain.   Skin:  Negative for pallor.   Neurological:  Negative for dizziness, syncope, weakness and light-headedness.   Psychiatric/Behavioral:  Negative for sleep disturbance. The patient is not nervous/anxious.        OBJECTIVE     Current Weight: Weight - Scale: 62.1 kg (137 lb)  Vitals:    09/04/24 1201   BP: 110/82   Pulse: 95   Resp: 16   Temp: (!) 96.9 °F (36.1 °C)   SpO2: 98%     Body mass index is 27.67 kg/m².    CURRENT MEDICATIONS       Current Outpatient Medications:     calcitriol (ROCALTROL) 0.25 mcg capsule, Take 1 capsule (0.25 mcg total) by mouth 3 (three) times a week, Disp: 40 capsule, Rfl: 2    CALCIUM PO, Take by mouth, Disp: , Rfl:     carvedilol (COREG) 3.125 mg tablet, TAKE 1 TABLET TWICE A DAY WITH MEALS, Disp: 180 tablet, Rfl: 1    Continuous Blood Gluc Sensor (FreeStyle Macy 3 Sensor) MISC, Use 1 each every 14 (fourteen) days, Disp: 6 each, Rfl: 3    ferrous sulfate 325 (65 Fe) mg tablet, Take 1 tablet (325 mg total) by mouth daily with breakfast, Disp: 90 tablet, Rfl: 1    furosemide (LASIX) 40 mg tablet, TAKE 1 TABLET DAILY, Disp: 100 tablet, Rfl: 1    gabapentin (NEURONTIN) 300 mg capsule, Take 2 capsules (600 mg total) by mouth 3 (three) times a day, Disp: 540 capsule, Rfl: 1    Insulin Glargine Solostar (Lantus SoloStar) 100 UNIT/ML SOPN, Inject 0.3 mL (30 Units total) under the skin daily at bedtime, Disp: , Rfl:     Jardiance 25 MG TABS, TAKE 1 TABLET DAILY, Disp: 90 tablet, Rfl: 3    Lancets (OneTouch Delica Plus [id number]) MISC, USE THREE TIMES A DAY, Disp: 300 each, Rfl: 2    loratadine (CLARITIN) 10 mg tablet, TAKE 1 TABLET BY MOUTH EVERY DAY, Disp: 90 tablet, Rfl: 1    metFORMIN (GLUCOPHAGE) 500 mg tablet, TAKE 1 TABLET THREE TIMES A DAY, Disp: 270 tablet, Rfl: 1    MULTIPLE VITAMIN PO, Take 1 tablet by mouth, Disp: , Rfl:     pantoprazole (PROTONIX) 40 mg tablet, TAKE 1  TABLET DAILY, Disp: 90 tablet, Rfl: 1    sodium chloride (EAMON 128) 5 % hypertonic ophthalmic solution, Administer 1 drop to both eyes every evening, Disp: , Rfl:     traMADol (ULTRAM) 50 mg tablet, Take 1 tablet (50 mg total) by mouth daily as needed for severe pain, Disp: 10 tablet, Rfl: 0    atorvastatin (LIPITOR) 20 mg tablet, TAKE 1 TABLET BY MOUTH EVERY DAY, Disp: 90 tablet, Rfl: 0    glimepiride (AMARYL) 4 mg tablet, TAKE 1 TABLET TWICE A DAY (Patient not taking: Reported on 9/4/2024), Disp: 200 tablet, Rfl: 1    PHYSICAL EXAMINATION     Physical Exam  Vitals reviewed.   Constitutional:       General: She is not in acute distress.  HENT:      Head: Normocephalic.      Mouth/Throat:      Lips: Pink.      Mouth: Mucous membranes are moist.   Eyes:      General: Lids are normal. No scleral icterus.  Cardiovascular:      Rate and Rhythm: Normal rate and regular rhythm.      Heart sounds: S1 normal and S2 normal. No murmur heard.  Pulmonary:      Effort: Pulmonary effort is normal. No accessory muscle usage or respiratory distress.      Breath sounds: Normal breath sounds.   Abdominal:      General: There is no distension.      Tenderness: There is no abdominal tenderness.   Musculoskeletal:      Cervical back: Normal range of motion and neck supple. No tenderness.   Skin:     General: Skin is warm.      Coloration: Skin is not cyanotic or jaundiced.   Neurological:      General: No focal deficit present.      Mental Status: She is alert and oriented to person, place, and time.   Psychiatric:         Attention and Perception: Attention normal.         Speech: Speech normal.         Behavior: Behavior is cooperative.           LAB RESULTS     CMP 8/28/2024:  Sodium 142  Potassium 4.2  Chloride 102  CO2 28  Anion Gap 12  BUN 26  Creatinine 1.30  Calcium 8.8  eGFR 41        RADIOLOGY RESULTS      CT renal stone study 9/18/2022:  Kidneys and ureters:  Unremarkable.   Bladder: Unremarkable.       ASSESSMENT/PLAN      Chronic Kidney Disease Stage 3:  After review of the medical record, it appears baseline creatinine level fluctuates around 1.2 - 1.4. Etiology of CKD likely multifactorial and suspected secondary to diabetic nephropathy and hypertensive nephrosclerosis.      CT renal stone study in September of 2022 showed no hydronephrosis or urinary retention.     She is maintained on furosemide 40 mg daily for management of alcoholic cirrhosis with ascites.  Clinically appears euvolemic on examination today.    Current creatinine level is 1.30 with an eGFR of 41. No significant hematuria on most recent urinalysis, no significant proteinuria on urine microalbumin to creatinine ratio.     Advised she stay hydrated and continue to avoid nephrotoxic agent such as NSAIDs.  Will repeat CKD labs prior to follow-up.     Hypertension:  Currently maintained on carvedilol 3.125 mg BID and furosemide 40 mg daily.  Blood pressure readings within acceptable range on the current regimen.     Secondary Hyperparathyroidism of renal origin:  Vitamin D 64.7, .5, phosphorous 3.7, and calcium 8.8. Currently maintained on cholecalciferol.  Will start calcitriol 0.25 mcg 3 times weekly and repeat labs prior to follow-up.     Type 2 Diabetes:  Currently maintained on insulin, Jardiance, and Metformin. She had an episode of hypoglycemia and her glimepiride was d/c by her PCP. She is also following with Endocrinology, most recent hemoglobin A1C 8.0. Discussed the importance of good glycemic control in the setting of underlying CKD and recommend goal hemoglobin A1c of 7.0 or less.    Iron Deficiency Anemia:  Current hemoglobin 11.3 and within acceptable range in the setting of underlying CKD. Following with Hematology and maintained on oral iron supplementation. Noted to have positive hemoccult study and history of bleeding esophageal varices and received EGD on 8/19/2024.     Recommend Nephrology follow-up in 6 months.    Lyla Crespo  "KRYSTLE Bradley  Nephrology  9/4/2024      Portions of the record may have been created with voice recognition software. Occasional wrong word or \"sound a like\" substitutions may have occurred due to the inherent limitations of voice recognition software. Read the chart carefully and recognize, using context, where substitutions have occurred.   "

## 2024-09-03 NOTE — TELEPHONE ENCOUNTER
I called and spoke to the patient and confirmed upcoming appointment with the patient. Emelia Mcclellan, .

## 2024-09-04 ENCOUNTER — TELEPHONE (OUTPATIENT)
Age: 74
End: 2024-09-04

## 2024-09-04 ENCOUNTER — OFFICE VISIT (OUTPATIENT)
Dept: NEPHROLOGY | Facility: CLINIC | Age: 74
End: 2024-09-04
Payer: COMMERCIAL

## 2024-09-04 ENCOUNTER — TELEPHONE (OUTPATIENT)
Dept: NEPHROLOGY | Facility: CLINIC | Age: 74
End: 2024-09-04

## 2024-09-04 VITALS
BODY MASS INDEX: 27.62 KG/M2 | DIASTOLIC BLOOD PRESSURE: 82 MMHG | RESPIRATION RATE: 16 BRPM | WEIGHT: 137 LBS | TEMPERATURE: 96.9 F | HEIGHT: 59 IN | SYSTOLIC BLOOD PRESSURE: 110 MMHG | HEART RATE: 95 BPM | OXYGEN SATURATION: 98 %

## 2024-09-04 DIAGNOSIS — D50.9 IRON DEFICIENCY ANEMIA, UNSPECIFIED IRON DEFICIENCY ANEMIA TYPE: ICD-10-CM

## 2024-09-04 DIAGNOSIS — N25.81 SECONDARY HYPERPARATHYROIDISM OF RENAL ORIGIN (HCC): ICD-10-CM

## 2024-09-04 DIAGNOSIS — N18.32 HYPERTENSION ASSOCIATED WITH STAGE 3B CHRONIC KIDNEY DISEASE DUE TO TYPE 2 DIABETES MELLITUS (HCC): ICD-10-CM

## 2024-09-04 DIAGNOSIS — E11.22 HYPERTENSION ASSOCIATED WITH STAGE 3B CHRONIC KIDNEY DISEASE DUE TO TYPE 2 DIABETES MELLITUS (HCC): ICD-10-CM

## 2024-09-04 DIAGNOSIS — E11.9 DIABETES MELLITUS TYPE 2 IN NONOBESE (HCC): ICD-10-CM

## 2024-09-04 DIAGNOSIS — I12.9 HYPERTENSION ASSOCIATED WITH STAGE 3B CHRONIC KIDNEY DISEASE DUE TO TYPE 2 DIABETES MELLITUS (HCC): ICD-10-CM

## 2024-09-04 DIAGNOSIS — N18.30 STAGE 3 CHRONIC KIDNEY DISEASE, UNSPECIFIED WHETHER STAGE 3A OR 3B CKD (HCC): Primary | ICD-10-CM

## 2024-09-04 PROCEDURE — 99214 OFFICE O/P EST MOD 30 MIN: CPT

## 2024-09-04 RX ORDER — ATORVASTATIN CALCIUM 20 MG/1
20 TABLET, FILM COATED ORAL DAILY
Qty: 90 TABLET | Refills: 0 | Status: SHIPPED | OUTPATIENT
Start: 2024-09-04

## 2024-09-04 RX ORDER — CALCITRIOL 0.25 UG/1
0.25 CAPSULE, LIQUID FILLED ORAL 3 TIMES WEEKLY
Qty: 40 CAPSULE | Refills: 2 | Status: SHIPPED | OUTPATIENT
Start: 2024-09-04

## 2024-09-04 NOTE — PATIENT INSTRUCTIONS
"Avoid NSAIDs (e.g., Ibuprofen, Motrin, Aleve, Naproxen, Advil)  We will start you on calcitriol 0.25 mcg three times weekly (take on Monday, Wednesday, and Friday)    Patient Education     Chronic kidney disease   The Basics   Written by the doctors and editors at [facility]   What is chronic kidney disease? -- Chronic kidney disease, or \"CKD,\" is when the kidneys stop working as well as they should. When they are working normally, the kidneys filter blood and remove waste and excess salt and water (figure 1).  In people with CKD, the kidneys slowly lose the ability to filter blood. In time, the kidneys can stop working completely. That is why it is so important to keep CKD from getting worse.  What are the symptoms of CKD? -- At first, CKD causes no symptoms. As the disease gets worse, it can:   Make your feet, ankles, or legs swell (called \"edema\")   Give you high blood pressure   Make you very tired   Damage your bones  Will I need tests? -- Yes. Your doctor will want to see you regularly. You will probably have appointments at least once a year, and you will get regular tests to check your kidneys. These include blood and urine tests.  If your CKD gets worse over time, you will probably need to see a \"nephrologist.\" This is a doctor who takes care of people with kidney disease.  Is there anything I can do to keep my kidneys from getting worse? -- Yes. If you have CKD, you can protect your kidneys if you:   Take all of your prescribed medicines every day, and follow all of your doctor's instructions for how to take them.   Keep your blood sugar in a healthy range, if you have diabetes.   Change your diet, if your doctor or nurse recommends to. They might suggest working with a dietitian (nutrition expert).   Quit smoking, if you smoke.   Lose weight, if you have excess body weight.   Avoid medicines that can harm the kidneys - One example is nonsteroidal antiinflammatory drugs (\"NSAIDs\"). These medicines include " "ibuprofen (sample brand names: Advil, Motrin) and naproxen (sample brand name: Aleve). There are other medicines that people with CKD need to avoid, too. Check with your doctor, nurse, or kidney specialist before starting any new medicines or supplements, even those you can buy without a prescription.  How is CKD treated? -- People in the early stages of CKD can take medicines to keep the disease from getting worse. For example, many people with CKD should take medicines known as \"ACE inhibitors\" or \"angiotensin receptor blockers.\" If your doctor or nurse prescribes these medicines, it is very important that you take them every day as directed. If they cause side effects or cost too much, tell your doctor or nurse. They might have solutions to offer.  What happens if my kidneys stop working completely? -- If your kidneys can no longer filter blood properly, you can choose between 3 different treatments to take over their job. Your choices are:   Kidney transplant - After transplant surgery, the new kidney can do the job of your own kidneys. If you have a kidney transplant, you will need to take medicines for the rest of your life to keep your body from reacting badly to the new kidney. (You only need 1 kidney to live.)   Hemodialysis - This is a procedure in which a dialysis machine takes over the job of the kidneys. The machine pumps blood out of the body, filters it, and returns it to the body. If you choose hemodialysis, you will need to be hooked up to the machine at least 3 times a week for several hours for the rest of your life. Before you start, you will also need to have surgery to prepare a blood vessel for attachment to the machine.   Peritoneal dialysis - This involves piping a special fluid into the belly every day. If you choose peritoneal dialysis, you will need surgery to have a tube implanted in your belly. Then, you will have to learn how to pipe the fluid in and out through that tube.  How do I " choose between the different treatment options? -- You and your doctor will need to work together to find a treatment that's right for you. Kidney transplant surgery is usually the best option for most people. But often, there are no kidneys available for transplant.  Ask your doctor to explain all of your options and how they might work for you. Then, talk openly with them about how you feel about all of the options. You might even decide that you do not want any treatment. That is your choice.  All topics are updated as new evidence becomes available and our peer review process is complete.  This topic retrieved from [employer] on: May 18, 2024.  Topic 50365 Version 34.0  Release: 32.4.3 - C32.137  © 2024 UpToDate, Inc. and/or its affiliates. All rights reserved.  figure 1: Anatomy of the urinary tract     Urine is made by the kidneys. It passes from the kidneys into the bladder through 2 tubes called the ureters. Then, it leaves the bladder through another tube called the urethra.  Graphic 10281 Version 8.0  Consumer Information Use and Disclaimer   Disclaimer: This generalized information is a limited summary of diagnosis, treatment, and/or medication information. It is not meant to be comprehensive and should be used as a tool to help the user understand and/or assess potential diagnostic and treatment options. It does NOT include all information about conditions, treatments, medications, side effects, or risks that may apply to a specific patient. It is not intended to be medical advice or a substitute for the medical advice, diagnosis, or treatment of a health care provider based on the health care provider's examination and assessment of a patient's specific and unique circumstances. Patients must speak with a health care provider for complete information about their health, medical questions, and treatment options, including any risks or benefits regarding use of medications. This information does not endorse  any treatments or medications as safe, effective, or approved for treating a specific patient. UpToDate, Inc. and its affiliates disclaim any warranty or liability relating to this information or the use thereof.The use of this information is governed by the Terms of Use, available at https://www.[employer].com/en/know/clinical-effectiveness-terms. 2024© UpToDate, Inc. and its affiliates and/or licensors. All rights reserved.  Copyright   © 2024 UpToDate, Inc. and/or its affiliates. All rights reserved.

## 2024-09-04 NOTE — TELEPHONE ENCOUNTER
PA for Calcitriol 0.25MCG SUBMITTED     via    [x]CMM-KEY: [id number]  [][employer]-Case ID #   []Faxed to plan   []Other website   []Phone call Case ID #     Office notes sent, clinical questions answered. Awaiting determination    Turnaround time for your insurance to make a decision on your Prior Authorization can take 7-21 business days.

## 2024-09-05 ENCOUNTER — TELEPHONE (OUTPATIENT)
Age: 74
End: 2024-09-05

## 2024-09-05 ENCOUNTER — PATIENT OUTREACH (OUTPATIENT)
Dept: FAMILY MEDICINE CLINIC | Facility: CLINIC | Age: 74
End: 2024-09-05

## 2024-09-05 DIAGNOSIS — D64.9 ANEMIA, UNSPECIFIED TYPE: ICD-10-CM

## 2024-09-05 DIAGNOSIS — Z12.31 ENCOUNTER FOR SCREENING MAMMOGRAM FOR BREAST CANCER: Primary | ICD-10-CM

## 2024-09-05 NOTE — TELEPHONE ENCOUNTER
Reason for call:   [x] Refill   [] Prior Auth  [x] Other: not a duplicate, [facility] that was sent back in July  Pt also wanted to make Michelle aware that she had her bloodwork and she's not sure she needs to take this iron pill daily, would like a call back if she agrees     Office:   [] PCP/Provider -   [x] Specialty/Provider - Michelle Ciaramella    Medication: ferrous sulfate     Dose/Frequency: 325 mg take one daily with breakfast    Quantity: 90    Pharmacy: [facility]    Does the patient have enough for 3 days?   [x] Yes   [] No - Send as HP to POD

## 2024-09-05 NOTE — TELEPHONE ENCOUNTER
Patient called- she wanted to let us know about her recent episode of her blood sugar at 32. She went to have her labs drawn on 8/28, on the way home she started feeling pretty badly and was seeing flashes. She made her  stop for ice cream but by the time they got home, she still didn't feel well so she ate a second ice cream.    Her PCP, Dr. Lorenzo, called her right away to alert her of her BS at 32 so it was caught in time before passing out. She said she is fine now, but saw nephrology yesterday who told her to follow up with us.    Dr. Lorenzo took her off of the Glimepiride and wanted to double check that this was ok with Megan as well. Please advise.    Also, she had a ton of labs done on the 28th, will she need more for her appointment in October? She figured so but wanted to ask anyway.

## 2024-09-06 RX ORDER — FERROUS SULFATE 325(65) MG
1 TABLET ORAL
Qty: 90 TABLET | Refills: 1 | Status: SHIPPED | OUTPATIENT
Start: 2024-09-06

## 2024-09-06 NOTE — TELEPHONE ENCOUNTER
Yes patient should take iron supplement, her labs show iron deficiency. Please communicate to patient. Rx sent to pharmacy.

## 2024-09-13 ENCOUNTER — TELEPHONE (OUTPATIENT)
Age: 74
End: 2024-09-13

## 2024-09-13 NOTE — TELEPHONE ENCOUNTER
Patient called asking when she is to take the calcitrol, and I told her that it is recommended to be taken in the morning, and she is to take it 3x a week once a day. No further questions at this time.

## 2024-09-20 ENCOUNTER — TELEPHONE (OUTPATIENT)
Age: 74
End: 2024-09-20

## 2024-09-20 DIAGNOSIS — D64.9 ANEMIA, UNSPECIFIED TYPE: ICD-10-CM

## 2024-09-20 RX ORDER — FERROUS SULFATE 325(65) MG
1 TABLET ORAL EVERY OTHER DAY
Qty: 90 TABLET | Refills: 1 | Status: SHIPPED | OUTPATIENT
Start: 2024-09-20

## 2024-09-20 NOTE — TELEPHONE ENCOUNTER
Patient is requesting a new prescription from Dr. Lorenzo for ferrous sulfate, one tablet EVERY OTHER day. Patient said that she has not received the new order sent to [facility] 09/06/24 for one tab qd. She said that she doesn't want to take one tablet daily and she would rather go to [facility].    Please submit a new prescription, if appropriate    Reason for call:   [x] Refill   [] Prior Auth  [] Other:     Office:   [x] PCP/Provider - Velasquez Dailey / jB    Medication: ferrous sulfate    Dose/Frequency: 325mg every other day    Quantity: 45 + 1 refill    Pharmacy: [facility]/pharmacy #[id number] - [place], [place] - [address]     Does the patient have enough for 3 days?   [x] Yes   [] No - Send as HP to POD

## 2024-09-23 DIAGNOSIS — F10.11 HISTORY OF ALCOHOL ABUSE: ICD-10-CM

## 2024-09-23 DIAGNOSIS — E11.9 DIABETES MELLITUS TYPE 2 IN NONOBESE (HCC): ICD-10-CM

## 2024-09-23 DIAGNOSIS — K70.30 ALCOHOLIC CIRRHOSIS OF LIVER WITHOUT ASCITES (HCC): ICD-10-CM

## 2024-09-23 DIAGNOSIS — J30.2 SEASONAL ALLERGIES: ICD-10-CM

## 2024-09-24 RX ORDER — LORATADINE 10 MG/1
10 TABLET ORAL DAILY
Qty: 90 TABLET | Refills: 1 | Status: SHIPPED | OUTPATIENT
Start: 2024-09-24

## 2024-09-24 RX ORDER — INSULIN GLARGINE 100 [IU]/ML
20 INJECTION, SOLUTION SUBCUTANEOUS
Qty: 15 ML | Refills: 1 | Status: SHIPPED | OUTPATIENT
Start: 2024-09-24

## 2024-09-24 RX ORDER — PANTOPRAZOLE SODIUM 40 MG/1
40 TABLET, DELAYED RELEASE ORAL DAILY
Qty: 90 TABLET | Refills: 1 | Status: SHIPPED | OUTPATIENT
Start: 2024-09-24

## 2024-10-09 DIAGNOSIS — E11.9 DIABETES MELLITUS TYPE 2 IN NONOBESE (HCC): ICD-10-CM

## 2024-10-15 ENCOUNTER — ANESTHESIA (OUTPATIENT)
Dept: GASTROENTEROLOGY | Facility: HOSPITAL | Age: 74
End: 2024-10-15
Payer: COMMERCIAL

## 2024-10-15 ENCOUNTER — HOSPITAL ENCOUNTER (OUTPATIENT)
Dept: GASTROENTEROLOGY | Facility: HOSPITAL | Age: 74
Setting detail: OUTPATIENT SURGERY
Discharge: HOME | End: 2024-10-15
Attending: INTERNAL MEDICINE
Payer: COMMERCIAL

## 2024-10-15 ENCOUNTER — ANESTHESIA EVENT (OUTPATIENT)
Dept: GASTROENTEROLOGY | Facility: HOSPITAL | Age: 74
End: 2024-10-15
Payer: COMMERCIAL

## 2024-10-15 VITALS
BODY MASS INDEX: 28.69 KG/M2 | HEIGHT: 58 IN | WEIGHT: 136.69 LBS | HEART RATE: 64 BPM | RESPIRATION RATE: 20 BRPM | OXYGEN SATURATION: 95 % | DIASTOLIC BLOOD PRESSURE: 65 MMHG | SYSTOLIC BLOOD PRESSURE: 141 MMHG | TEMPERATURE: 97.4 F

## 2024-10-15 DIAGNOSIS — I85.11 SECONDARY ESOPHAGEAL VARICES WITH BLEEDING (HCC): ICD-10-CM

## 2024-10-15 LAB — GLUCOSE SERPL-MCNC: 106 MG/DL (ref 65–140)

## 2024-10-15 PROCEDURE — 82948 REAGENT STRIP/BLOOD GLUCOSE: CPT

## 2024-10-15 PROCEDURE — 88313 SPECIAL STAINS GROUP 2: CPT | Performed by: PATHOLOGY

## 2024-10-15 PROCEDURE — 88305 TISSUE EXAM BY PATHOLOGIST: CPT | Performed by: PATHOLOGY

## 2024-10-15 PROCEDURE — 43251 EGD REMOVE LESION SNARE: CPT | Performed by: INTERNAL MEDICINE

## 2024-10-15 RX ORDER — SODIUM CHLORIDE, SODIUM LACTATE, POTASSIUM CHLORIDE, CALCIUM CHLORIDE 600; 310; 30; 20 MG/100ML; MG/100ML; MG/100ML; MG/100ML
INJECTION, SOLUTION INTRAVENOUS CONTINUOUS PRN
Status: DISCONTINUED | OUTPATIENT
Start: 2024-10-15 | End: 2024-10-15

## 2024-10-15 RX ORDER — LIDOCAINE HYDROCHLORIDE 20 MG/ML
INJECTION, SOLUTION EPIDURAL; INFILTRATION; INTRACAUDAL; PERINEURAL AS NEEDED
Status: DISCONTINUED | OUTPATIENT
Start: 2024-10-15 | End: 2024-10-15

## 2024-10-15 RX ORDER — PROPOFOL 10 MG/ML
INJECTION, EMULSION INTRAVENOUS AS NEEDED
Status: DISCONTINUED | OUTPATIENT
Start: 2024-10-15 | End: 2024-10-15

## 2024-10-15 RX ORDER — SODIUM CHLORIDE, SODIUM LACTATE, POTASSIUM CHLORIDE, CALCIUM CHLORIDE 600; 310; 30; 20 MG/100ML; MG/100ML; MG/100ML; MG/100ML
100 INJECTION, SOLUTION INTRAVENOUS CONTINUOUS
Status: CANCELLED | OUTPATIENT
Start: 2024-10-15

## 2024-10-15 RX ADMIN — LIDOCAINE HYDROCHLORIDE 100 MG: 20 INJECTION, SOLUTION EPIDURAL; INFILTRATION; INTRACAUDAL; PERINEURAL at 07:53

## 2024-10-15 RX ADMIN — SODIUM CHLORIDE, SODIUM LACTATE, POTASSIUM CHLORIDE, AND CALCIUM CHLORIDE: .6; .31; .03; .02 INJECTION, SOLUTION INTRAVENOUS at 07:49

## 2024-10-15 RX ADMIN — PROPOFOL 70 MG: 10 INJECTION, EMULSION INTRAVENOUS at 07:53

## 2024-10-15 RX ADMIN — PROPOFOL 50 MG: 10 INJECTION, EMULSION INTRAVENOUS at 07:55

## 2024-10-15 RX ADMIN — PROPOFOL 50 MG: 10 INJECTION, EMULSION INTRAVENOUS at 07:57

## 2024-10-15 RX ADMIN — PROPOFOL 50 MG: 10 INJECTION, EMULSION INTRAVENOUS at 08:00

## 2024-10-15 NOTE — ANESTHESIA PREPROCEDURE EVALUATION
Medical History    History Comments   ALC (alcoholic liver cirrhosis) (HCC)    Diabetes mellitus (HCC)    Neuropathy    Hypertension    HLD (hyperlipidemia)    Seasonal allergies    Pleural effusion    Acute embolism and thrombosis of other specified deep vein of right lower extremity (HCC)    Neuropathy    Acute blood loss anemia    Edema    Colon polyp    Thrombocytopenia (HCC)    Chronic kidney disease    Liver disease    Procedure:  EGD    Relevant Problems   ANESTHESIA (within normal limits)      CARDIO   (+) Hypertension associated with stage 3b chronic kidney disease due to type 2 diabetes mellitus (HCC)   (+) Hypertension secondary to other renal disorders   (+) Mixed hyperlipidemia      ENDO   (+) Diabetes mellitus type 2 in nonobese (HCC)   (+) Secondary hyperparathyroidism of renal origin (HCC)      GI/HEPATIC   (+) Alcoholic cirrhosis of liver without ascites (HCC)      /RENAL   (+) Hypertension associated with stage 3b chronic kidney disease due to type 2 diabetes mellitus (HCC)   (+) Stage 3 chronic kidney disease, unspecified whether stage 3a or 3b CKD (HCC)      NEURO/PSYCH   (+) Continuous opioid dependence (HCC)   (+) Diabetic peripheral neuropathy (HCC)        Physical Exam    Airway    Mallampati score: II  TM Distance: >3 FB  Neck ROM: full     Dental       Cardiovascular  Rate: normal    Pulmonary  Pulmonary exam normal     Other Findings  Per pt denies anything remaining that is loose or removeablepost-pubertal.      Anesthesia Plan  ASA Score- 3     Anesthesia Type- IV sedation with anesthesia with ASA Monitors.         Additional Monitors:     Airway Plan:     Comment: Per patient, appropriately NPO, denies active CP/SOB/wheezing/symptoms related to heartburn/nausea/vomiting  .       Plan Factors-Exercise tolerance (METS): >4 METS.    Chart reviewed.    Patient summary reviewed.    Patient is a current smoker.              Induction- intravenous.    Postoperative Plan-         Informed  Consent- Anesthetic plan and risks discussed with patient.  I personally reviewed this patient with the CRNA. Discussed and agreed on the Anesthesia Plan with the CRNA..

## 2024-10-15 NOTE — ANESTHESIA POSTPROCEDURE EVALUATION
Post-Op Assessment Note    CV Status:  Stable    Pain management: adequate       Mental Status:  Sleepy and arousable   Hydration Status:  Euvolemic   PONV Controlled:  Controlled   Airway Patency:  Patent  Two or more mitigation strategies used for obstructive sleep apnea   Post Op Vitals Reviewed: Yes    No anethesia notable event occurred.    Staff: Anesthesiologist, CRNA           Last Filed PACU Vitals:  Vitals Value Taken Time   Temp 97.6    Pulse 77    /67    Resp 19    SpO2 99        Modified Evie:  Activity: 1 (10/15/2024  8:12 AM)  Respiration: 2 (10/15/2024  8:12 AM)  Circulation: 2 (10/15/2024  8:12 AM)  Consciousness: 1 (10/15/2024  8:12 AM)  Oxygen Saturation: 2 (10/15/2024  8:12 AM)  Modified Evie Score: 8 (10/15/2024  8:12 AM)

## 2024-10-15 NOTE — H&P
"History and Physical -  [facility]  Iman Walden 74 y.o. female MRN: [id number]      HPI: Iman Walden is a 74 y.o. year old female who presents for gastric polyps and bleeding, anemia      REVIEW OF SYSTEMS: Per the HPI, and otherwise unremarkable.    Historical Information   Past Medical History:   Diagnosis Date    Acute blood loss anemia 02/20/2021    Acute embolism and thrombosis of other specified deep vein of right lower extremity (HCC) 11/16/2022    ALC (alcoholic liver cirrhosis) (HCC)     Chronic kidney disease     Colon polyp     Diabetes mellitus (HCC)     Edema 02/23/2021    HLD (hyperlipidemia)     Hypertension     Liver disease     Neuropathy     Neuropathy 02/20/2021    Pleural effusion 02/20/2021    Seasonal allergies     Thrombocytopenia (HCC)      Past Surgical History:   Procedure Laterality Date    BREAST BIOPSY      CHOLECYSTECTOMY      NASAL SEPTUM SURGERY       Social History   Social History     Substance and Sexual Activity   Alcohol Use Never     Social History     Substance and Sexual Activity   Drug Use Yes    Frequency: 7.0 times per week    Types: Marijuana    Comment:  unknown     Social History     Tobacco Use   Smoking Status Never    Passive exposure: Never   Smokeless Tobacco Never     Family History   Problem Relation Age of Onset    Emphysema Mother     Heart attack Father     Heart disease Father        Meds/Allergies     Not in a hospital admission.    Allergies   Allergen Reactions    Crab (Diagnostic) - Food Allergy Anaphylaxis    Bee Pollen Other (See Comments)     Other reaction(s): Other (See Comments)    Pollen Extract Other (See Comments)       Objective     Blood pressure 168/72, pulse 71, resp. rate 18, height 4' 10\" (1.473 m), weight 62 kg (136 lb 11 oz), SpO2 97%.      PHYSICAL EXAM    Gen: NAD  CV: RRR  CHEST: Clear  ABD: soft, NT/ND  EXT: no edema      ASSESSMENT/PLAN:  This is a 74 y.o. year old female " here for egd, and she is stable and optimized for her procedure.

## 2024-10-15 NOTE — ANESTHESIA POSTPROCEDURE EVALUATION
Post-Op Assessment Note    Last Filed PACU Vitals:  Vitals Value Taken Time   Temp 97.4 °F (36.3 °C) 10/15/24 0812   Pulse 64 10/15/24 0832   /65 10/15/24 0832   Resp 20 10/15/24 0832   SpO2 95 % 10/15/24 0832       Modified Evie:  Activity: 2 (10/15/2024  8:32 AM)  Respiration: 2 (10/15/2024  8:32 AM)  Circulation: 2 (10/15/2024  8:32 AM)  Consciousness: 2 (10/15/2024  8:32 AM)  Oxygen Saturation: 2 (10/15/2024  8:32 AM)  Modified Evie Score: 10 (10/15/2024  8:32 AM)

## 2024-10-16 ENCOUNTER — TELEPHONE (OUTPATIENT)
Dept: GASTROENTEROLOGY | Facility: CLINIC | Age: 74
End: 2024-10-16

## 2024-10-16 ENCOUNTER — PREP FOR PROCEDURE (OUTPATIENT)
Dept: GASTROENTEROLOGY | Facility: CLINIC | Age: 74
End: 2024-10-16

## 2024-10-16 DIAGNOSIS — K70.30 ALCOHOLIC CIRRHOSIS OF LIVER WITHOUT ASCITES (HCC): ICD-10-CM

## 2024-10-16 DIAGNOSIS — I85.11 SECONDARY ESOPHAGEAL VARICES WITH BLEEDING (HCC): Primary | ICD-10-CM

## 2024-10-16 NOTE — TELEPHONE ENCOUNTER
Called patient and scheduled a repeat EGD with Dr Goncalves  1/28/25  Reviewed prep and Jardiance 4 Day hold Instructions

## 2024-10-16 NOTE — TELEPHONE ENCOUNTER
----- Message from Azael Goncalves DO sent at 10/15/2024  8:08 AM EDT -----  Regarding: New EGD  Ebenezer,    Please schedule another EGD in January 2025

## 2024-10-17 PROCEDURE — 88313 SPECIAL STAINS GROUP 2: CPT | Performed by: PATHOLOGY

## 2024-10-17 PROCEDURE — 88305 TISSUE EXAM BY PATHOLOGIST: CPT | Performed by: PATHOLOGY

## 2024-10-23 ENCOUNTER — APPOINTMENT (OUTPATIENT)
Age: 74
End: 2024-10-23
Payer: COMMERCIAL

## 2024-10-23 DIAGNOSIS — F11.20 CONTINUOUS OPIOID DEPENDENCE (HCC): ICD-10-CM

## 2024-10-23 DIAGNOSIS — I12.9 HYPERTENSION ASSOCIATED WITH STAGE 3B CHRONIC KIDNEY DISEASE DUE TO TYPE 2 DIABETES MELLITUS (HCC): ICD-10-CM

## 2024-10-23 DIAGNOSIS — N18.32 HYPERTENSION ASSOCIATED WITH STAGE 3B CHRONIC KIDNEY DISEASE DUE TO TYPE 2 DIABETES MELLITUS (HCC): ICD-10-CM

## 2024-10-23 DIAGNOSIS — E11.9 DIABETES MELLITUS TYPE 2 IN NONOBESE (HCC): ICD-10-CM

## 2024-10-23 DIAGNOSIS — D50.0 IRON DEFICIENCY ANEMIA DUE TO CHRONIC BLOOD LOSS: ICD-10-CM

## 2024-10-23 DIAGNOSIS — E11.42 DIABETIC PERIPHERAL NEUROPATHY (HCC): ICD-10-CM

## 2024-10-23 DIAGNOSIS — E11.22 HYPERTENSION ASSOCIATED WITH STAGE 3B CHRONIC KIDNEY DISEASE DUE TO TYPE 2 DIABETES MELLITUS (HCC): ICD-10-CM

## 2024-10-23 DIAGNOSIS — N18.32 CKD STAGE 3B, GFR 30-44 ML/MIN (HCC): ICD-10-CM

## 2024-10-23 DIAGNOSIS — N18.30 STAGE 3 CHRONIC KIDNEY DISEASE, UNSPECIFIED WHETHER STAGE 3A OR 3B CKD (HCC): ICD-10-CM

## 2024-10-23 LAB
ALBUMIN SERPL BCG-MCNC: 3.1 G/DL (ref 3.5–5)
ALP SERPL-CCNC: 56 U/L (ref 34–104)
ALT SERPL W P-5'-P-CCNC: 14 U/L (ref 7–52)
ANION GAP SERPL CALCULATED.3IONS-SCNC: 11 MMOL/L (ref 4–13)
AST SERPL W P-5'-P-CCNC: 18 U/L (ref 13–39)
BACTERIA UR QL AUTO: ABNORMAL /HPF
BASOPHILS # BLD AUTO: 0.05 THOUSANDS/ΜL (ref 0–0.1)
BASOPHILS NFR BLD AUTO: 1 % (ref 0–1)
BILIRUB SERPL-MCNC: 0.49 MG/DL (ref 0.2–1)
BILIRUB UR QL STRIP: NEGATIVE
BUN SERPL-MCNC: 19 MG/DL (ref 5–25)
CALCIUM ALBUM COR SERPL-MCNC: 9.5 MG/DL (ref 8.3–10.1)
CALCIUM SERPL-MCNC: 8.8 MG/DL (ref 8.4–10.2)
CHLORIDE SERPL-SCNC: 100 MMOL/L (ref 96–108)
CLARITY UR: CLEAR
CO2 SERPL-SCNC: 29 MMOL/L (ref 21–32)
COLOR UR: ABNORMAL
CREAT SERPL-MCNC: 1.42 MG/DL (ref 0.6–1.3)
CREAT UR-MCNC: 47.8 MG/DL
EOSINOPHIL # BLD AUTO: 0.73 THOUSAND/ΜL (ref 0–0.61)
EOSINOPHIL NFR BLD AUTO: 12 % (ref 0–6)
ERYTHROCYTE [DISTWIDTH] IN BLOOD BY AUTOMATED COUNT: 13.9 % (ref 11.6–15.1)
EST. AVERAGE GLUCOSE BLD GHB EST-MCNC: 169 MG/DL
FERRITIN SERPL-MCNC: 24 NG/ML (ref 11–307)
GFR SERPL CREATININE-BSD FRML MDRD: 36 ML/MIN/1.73SQ M
GLUCOSE P FAST SERPL-MCNC: 120 MG/DL (ref 65–99)
GLUCOSE UR STRIP-MCNC: ABNORMAL MG/DL
HBA1C MFR BLD: 7.5 %
HCT VFR BLD AUTO: 35.1 % (ref 34.8–46.1)
HGB BLD-MCNC: 11.5 G/DL (ref 11.5–15.4)
HGB UR QL STRIP.AUTO: NEGATIVE
IMM GRANULOCYTES # BLD AUTO: 0.02 THOUSAND/UL (ref 0–0.2)
IMM GRANULOCYTES NFR BLD AUTO: 0 % (ref 0–2)
IRON SATN MFR SERPL: 59 % (ref 15–50)
IRON SERPL-MCNC: 189 UG/DL (ref 50–212)
KETONES UR STRIP-MCNC: NEGATIVE MG/DL
LEUKOCYTE ESTERASE UR QL STRIP: ABNORMAL
LYMPHOCYTES # BLD AUTO: 0.64 THOUSANDS/ΜL (ref 0.6–4.47)
LYMPHOCYTES NFR BLD AUTO: 11 % (ref 14–44)
MCH RBC QN AUTO: 28.8 PG (ref 26.8–34.3)
MCHC RBC AUTO-ENTMCNC: 32.8 G/DL (ref 31.4–37.4)
MCV RBC AUTO: 88 FL (ref 82–98)
MICROALBUMIN UR-MCNC: <7 MG/L
MONOCYTES # BLD AUTO: 0.35 THOUSAND/ΜL (ref 0.17–1.22)
MONOCYTES NFR BLD AUTO: 6 % (ref 4–12)
MUCOUS THREADS UR QL AUTO: ABNORMAL
NEUTROPHILS # BLD AUTO: 4.12 THOUSANDS/ΜL (ref 1.85–7.62)
NEUTS SEG NFR BLD AUTO: 70 % (ref 43–75)
NITRITE UR QL STRIP: NEGATIVE
NON-SQ EPI CELLS URNS QL MICRO: ABNORMAL /HPF
NRBC BLD AUTO-RTO: 0 /100 WBCS
PH UR STRIP.AUTO: 5.5 [PH]
PLATELET # BLD AUTO: 151 THOUSANDS/UL (ref 149–390)
PMV BLD AUTO: 9.7 FL (ref 8.9–12.7)
POTASSIUM SERPL-SCNC: 4.4 MMOL/L (ref 3.5–5.3)
PROT SERPL-MCNC: 6.6 G/DL (ref 6.4–8.4)
PROT UR STRIP-MCNC: NEGATIVE MG/DL
RBC # BLD AUTO: 3.99 MILLION/UL (ref 3.81–5.12)
RBC #/AREA URNS AUTO: ABNORMAL /HPF
SODIUM SERPL-SCNC: 140 MMOL/L (ref 135–147)
SP GR UR STRIP.AUTO: 1.02 (ref 1–1.03)
TIBC SERPL-MCNC: 319 UG/DL (ref 250–450)
UIBC SERPL-MCNC: 130 UG/DL (ref 155–355)
UROBILINOGEN UR STRIP-ACNC: <2 MG/DL
WBC # BLD AUTO: 5.91 THOUSAND/UL (ref 4.31–10.16)
WBC #/AREA URNS AUTO: ABNORMAL /HPF

## 2024-10-23 PROCEDURE — 36415 COLL VENOUS BLD VENIPUNCTURE: CPT

## 2024-10-23 PROCEDURE — 83550 IRON BINDING TEST: CPT

## 2024-10-23 PROCEDURE — 82728 ASSAY OF FERRITIN: CPT

## 2024-10-23 PROCEDURE — 80053 COMPREHEN METABOLIC PANEL: CPT

## 2024-10-23 PROCEDURE — 82570 ASSAY OF URINE CREATININE: CPT

## 2024-10-23 PROCEDURE — 83540 ASSAY OF IRON: CPT

## 2024-10-23 PROCEDURE — 81001 URINALYSIS AUTO W/SCOPE: CPT

## 2024-10-23 PROCEDURE — 85025 COMPLETE CBC W/AUTO DIFF WBC: CPT

## 2024-10-23 PROCEDURE — 83036 HEMOGLOBIN GLYCOSYLATED A1C: CPT

## 2024-10-23 PROCEDURE — 82043 UR ALBUMIN QUANTITATIVE: CPT

## 2024-10-23 NOTE — TELEPHONE ENCOUNTER
Reason for call:   [x] Refill   [] Prior Auth  [] Other:     Office:   [x] PCP/Provider -   [] Specialty/Provider -     Medication: Tramadol 50mg    Dose/Frequency: 1 tab daily    Quantity: 30    Pharmacy: [facility]/pharmacy #[id number] - [facility] - [address] [phone]     Does the patient have enough for 3 days?   [] Yes   [x] No - out of medication

## 2024-10-25 RX ORDER — TRAMADOL HYDROCHLORIDE 50 MG/1
50 TABLET ORAL DAILY PRN
Qty: 30 TABLET | Refills: 0 | Status: SHIPPED | OUTPATIENT
Start: 2024-10-25

## 2024-10-29 ENCOUNTER — VBI (OUTPATIENT)
Dept: ADMINISTRATIVE | Facility: OTHER | Age: 74
End: 2024-10-29

## 2024-10-29 NOTE — TELEPHONE ENCOUNTER
10/29/24 1:56 PM     Chart reviewed for Mammogram was/were not submitted to the patient's insurance.     Pennie Montoya MA   [facility]

## 2024-10-30 ENCOUNTER — OFFICE VISIT (OUTPATIENT)
Age: 74
End: 2024-10-30
Payer: COMMERCIAL

## 2024-10-30 VITALS
BODY MASS INDEX: 27.71 KG/M2 | WEIGHT: 132 LBS | SYSTOLIC BLOOD PRESSURE: 118 MMHG | OXYGEN SATURATION: 98 % | TEMPERATURE: 98 F | HEART RATE: 72 BPM | HEIGHT: 58 IN | DIASTOLIC BLOOD PRESSURE: 70 MMHG

## 2024-10-30 DIAGNOSIS — E78.2 MIXED HYPERLIPIDEMIA: ICD-10-CM

## 2024-10-30 DIAGNOSIS — E11.42 DIABETIC PERIPHERAL NEUROPATHY (HCC): ICD-10-CM

## 2024-10-30 DIAGNOSIS — M81.0 AGE-RELATED OSTEOPOROSIS WITHOUT CURRENT PATHOLOGICAL FRACTURE: ICD-10-CM

## 2024-10-30 DIAGNOSIS — E11.9 DIABETES MELLITUS TYPE 2 IN NONOBESE (HCC): Primary | ICD-10-CM

## 2024-10-30 PROCEDURE — 99214 OFFICE O/P EST MOD 30 MIN: CPT

## 2024-10-30 RX ORDER — INSULIN GLARGINE 100 [IU]/ML
25 INJECTION, SOLUTION SUBCUTANEOUS
Qty: 15 ML | Refills: 1 | Status: SHIPPED | OUTPATIENT
Start: 2024-10-30

## 2024-10-30 RX ORDER — INSULIN GLARGINE 100 [IU]/ML
20 INJECTION, SOLUTION SUBCUTANEOUS
Qty: 15 ML | Refills: 1 | Status: SHIPPED | OUTPATIENT
Start: 2024-10-30 | End: 2024-10-30 | Stop reason: SDUPTHER

## 2024-10-30 RX ORDER — BLOOD SUGAR DIAGNOSTIC
STRIP MISCELLANEOUS
COMMUNITY
Start: 2024-09-08

## 2024-10-30 NOTE — PROGRESS NOTES
Ambulatory Visit  Name: Iman Walden      : 1950      MRN: [id number]  Encounter Provider: KRYSTLE Neville  Encounter Date: 10/30/2024   Encounter department: [facility]    Assessment & Plan  Diabetes mellitus type 2 in nonobese (HCC)  A1c 7.5%.  Goal A1c closer to 7%.    Glimepiride was discontinued by primary care provider 2024 after episode of severe hypoglycemia.  In agreement with discontinuation, stop glimepiride.    Discontinue metformin due to reduced eGFR.    Due to frequent episodes of hypoglycemia upon awakening and throughout the day, decrease Lantus from 30 units to 25 units.    We may consider Rybelsus cautiously at her next appointment if A1c is not improved.  Discussed due to eGFR, her not injectable medication options are limited.    Counseled on the long-term effects of hyperglycemia and uncontrolled diabetes including risk for heart attack, stroke, kidney failure, diabetic foot ulcers, limb loss, blindness, and death.     Advised lifestyle modifications including attention to diet including the amount and types of carbohydrates consumed and regular physical activity.      Discussed symptoms and treatment of hypoglycemia.  Reviewed risks associated with hypoglycemia.  Rule of 15's provided in AVS.  She knows to call the office for blood glucose levels less than 80 mg/dL or persistent patterns over 250 mg/dL.  Offered and declined continuous glucose monitor.  Continue fingersticks at least once daily, at different times of the day.    Follow-up in 3 months.  Labs prior to next visit.  Lab Results   Component Value Date    HGBA1C 7.5 (H) 10/23/2024       Orders:    Lantus SoloStar 100 units/mL SOPN; Inject 0.25 mL (25 Units total) under the skin daily at bedtime    TSH, 3rd generation with Free T4 reflex; Future    Comprehensive metabolic panel; Future    Hemoglobin A1C; Future    Mixed  hyperlipidemia  Continue atorvastatin 20 mg daily.       Age-related osteoporosis without current pathological fracture  Managed by PCP.  Recent fall without injury. Did not present to ED or Urgent Care.  Continue Calcium and Vitamin D3 supplementation.  Fall precautions         Diabetic peripheral neuropathy (HCC)  Continue gabapentin 600 mg 3 times daily.  Encouraged to continue follow-up with podiatry for proper foot care, nail care, and maintenance.         History of Present Illness     Iman Walden is a 74 y.o. female who presents to the office today for follow-up of type 2 diabetes, with long term insulin use. Diabetes course has been unstable. Complications of diabetes include: CKD, neuropathy, retinopathy.  Past medical history is significant for anemia, DVT, bleeding esophageal varices, alcoholic liver cirrhosis, pleural effusion, thrombocytopenia she was last seen in the office 7/9/2024 at which time her A1c was 7.8% and she was not performing fingersticks.  Her most recent A1c is now 7.5%    She had recent fall 10/14/24. No broken bones.    Reports recent episodes of hypoglycemia.  Symptoms of hypoglycemia include: visual disturbances    Current home glucose monitoring: Macy 3    Current Medication Regimen:   Lantus 30 units daily  Jardiance 25 mg daily  Metformin 1500 mg daily    Poor candidate for GLP-1 agonist medications due to history of bleeding esophageal varices.    Diabetic Eye Exam: scheduled 12/2024 [facility], positive retinopathy  Follows with Podiatry: Dr. Clinton, needs schedule  Influenza Vaccine: Declines  Has Thyroid Disorder: No  Hypertension, followed by PCP, taking: Not on ACEi/ARB  Hyperlipidemia, followed by PCP, taking: Lipitor 20 mg daily, Tolerating well with no myalgias  History of Pancreatitis: No  Medic Alert Tag: Recommended  Diabetes Education: Attended      History obtained from : patient and patient's Significant Other  Review of Systems    Constitutional:  Negative for fever.   HENT:  Negative for trouble swallowing and voice change.    Eyes:  Negative for pain and visual disturbance.   Respiratory:  Negative for cough and shortness of breath.    Cardiovascular:  Negative for chest pain and palpitations.   Gastrointestinal:  Negative for constipation and diarrhea.   Endocrine: Negative for polydipsia and polyuria.   Genitourinary:  Negative for dysuria.   Musculoskeletal:  Positive for arthralgias, back pain and gait problem.   Skin:  Negative for wound.   Neurological:  Positive for tremors.   Psychiatric/Behavioral:  Negative for sleep disturbance.      Current Outpatient Medications on File Prior to Visit   Medication Sig Dispense Refill    atorvastatin (LIPITOR) 20 mg tablet TAKE 1 TABLET BY MOUTH EVERY DAY 90 tablet 0    calcitriol (ROCALTROL) 0.25 mcg capsule Take 1 capsule (0.25 mcg total) by mouth 3 (three) times a week 40 capsule 2    CALCIUM PO Take by mouth      carvedilol (COREG) 3.125 mg tablet TAKE 1 TABLET TWICE A DAY WITH MEALS 180 tablet 1    ferrous sulfate 325 (65 Fe) mg tablet Take 1 tablet (325 mg total) by mouth every other day 90 tablet 1    furosemide (LASIX) 40 mg tablet TAKE 1 TABLET DAILY 100 tablet 1    gabapentin (NEURONTIN) 300 mg capsule Take 2 capsules (600 mg total) by mouth 3 (three) times a day 540 capsule 1    Jardiance 25 MG TABS TAKE 1 TABLET DAILY 90 tablet 3    Lancets (OneTouch Delica Plus [id number]) MISC USE THREE TIMES A  each 2    loratadine (CLARITIN) 10 mg tablet TAKE 1 TABLET BY MOUTH EVERY DAY 90 tablet 1    metFORMIN (GLUCOPHAGE) 500 mg tablet TAKE 1 TABLET THREE TIMES A  tablet 1    MULTIPLE VITAMIN PO Take 1 tablet by mouth      OneTouch Ultra test strip       pantoprazole (PROTONIX) 40 mg tablet TAKE 1 TABLET BY MOUTH EVERY DAY 90 tablet 1    sodium chloride (EAMON 128) 5 % hypertonic ophthalmic solution Administer 1 drop to both eyes every evening      traMADol (ULTRAM) 50 mg tablet  "Take 1 tablet (50 mg total) by mouth daily as needed for severe pain 30 tablet 0    [DISCONTINUED] Insulin Glargine Solostar (Lantus SoloStar) 100 UNIT/ML SOPN INJECT 20 UNITS UNDER THE SKIN DAILY AT BEDTIME (Patient taking differently: Inject 30 Units under the skin daily at bedtime) 15 mL 1    [DISCONTINUED] Continuous Blood Gluc Sensor (FreeStyle Macy 3 Sensor) MISC Use 1 each every 14 (fourteen) days (Patient not taking: Reported on 10/30/2024) 6 each 3    [DISCONTINUED] glimepiride (AMARYL) 4 mg tablet TAKE 1 TABLET TWICE A DAY (Patient not taking: Reported on 9/4/2024) 200 tablet 1     No current facility-administered medications on file prior to visit.      Social History     Tobacco Use    Smoking status: Never     Passive exposure: Never    Smokeless tobacco: Never   Vaping Use    Vaping status: Never Used   Substance and Sexual Activity    Alcohol use: Never    Drug use: Yes     Frequency: 7.0 times per week     Types: Marijuana     Comment:  unknown    Sexual activity: Yes     Partners: Male         Objective     /70 (BP Location: Right arm, Patient Position: Sitting, Cuff Size: Standard)   Pulse 72   Temp 98 °F (36.7 °C)   Ht 4' 10\" (1.473 m)   Wt 59.9 kg (132 lb)   SpO2 98%   BMI 27.59 kg/m²     Physical Exam  Vitals reviewed.   Constitutional:       General: She is not in acute distress.     Appearance: Normal appearance. She is well-groomed and overweight.   HENT:      Head: Normocephalic and atraumatic.      Mouth/Throat:      Mouth: Mucous membranes are moist.   Eyes:      Conjunctiva/sclera: Conjunctivae normal.   Cardiovascular:      Rate and Rhythm: Normal rate.   Pulmonary:      Effort: Pulmonary effort is normal. No respiratory distress.   Abdominal:      Palpations: Abdomen is soft.      Tenderness: There is no abdominal tenderness.   Musculoskeletal:         General: No swelling.      Cervical back: Normal range of motion.   Skin:     General: Skin is warm and dry.      Capillary " Refill: Capillary refill takes less than 2 seconds.   Neurological:      General: No focal deficit present.      Mental Status: She is alert and oriented to person, place, and time.   Psychiatric:         Mood and Affect: Mood normal.         Behavior: Behavior normal. Behavior is cooperative.         Thought Content: Thought content normal.       Administrative Statements   I have spent a total time of 38 minutes in caring for this patient on the day of the visit/encounter including Diagnostic results, Prognosis, Risks and benefits of tx options, Instructions for management, Patient and family education, Importance of tx compliance, Risk factor reductions, Impressions, Counseling / Coordination of care, Documenting in the medical record, Reviewing / ordering tests, medicine, procedures  , and Obtaining or reviewing history  .

## 2024-10-30 NOTE — ASSESSMENT & PLAN NOTE
A1c 7.5%.  Goal A1c closer to 7%.    Glimepiride was discontinued by primary care provider 8/2024 after episode of severe hypoglycemia.  In agreement with discontinuation, stop glimepiride.    Discontinue metformin due to reduced eGFR.    Due to frequent episodes of hypoglycemia upon awakening and throughout the day, decrease Lantus from 30 units to 25 units.    We may consider Rybelsus cautiously at her next appointment if A1c is not improved.  Discussed due to eGFR, her not injectable medication options are limited.    Counseled on the long-term effects of hyperglycemia and uncontrolled diabetes including risk for heart attack, stroke, kidney failure, diabetic foot ulcers, limb loss, blindness, and death.     Advised lifestyle modifications including attention to diet including the amount and types of carbohydrates consumed and regular physical activity.      Discussed symptoms and treatment of hypoglycemia.  Reviewed risks associated with hypoglycemia.  Rule of 15's provided in AVS.  She knows to call the office for blood glucose levels less than 80 mg/dL or persistent patterns over 250 mg/dL.  Offered and declined continuous glucose monitor.  Continue fingersticks at least once daily, at different times of the day.    Follow-up in 3 months.  Labs prior to next visit.  Lab Results   Component Value Date    HGBA1C 7.5 (H) 10/23/2024       Orders:    Lantus SoloStar 100 units/mL SOPN; Inject 0.25 mL (25 Units total) under the skin daily at bedtime    TSH, 3rd generation with Free T4 reflex; Future    Comprehensive metabolic panel; Future    Hemoglobin A1C; Future

## 2024-10-30 NOTE — ASSESSMENT & PLAN NOTE
Continue gabapentin 600 mg 3 times daily.  Encouraged to continue follow-up with podiatry for proper foot care, nail care, and maintenance.

## 2024-10-30 NOTE — ASSESSMENT & PLAN NOTE
Managed by PCP.  Recent fall without injury. Did not present to ED or Urgent Care.  Continue Calcium and Vitamin D3 supplementation.  Fall precautions

## 2024-10-30 NOTE — PATIENT INSTRUCTIONS
STOP Metformin  Decrease Lantus to 25 units nightly  Call the office for blood glucose levels less than 80 mg/dL or persistent patterns over 250 mg/dL    Low Blood Sugar  Steps to treat low blood sugar.    1. Test blood sugar if you have symptoms of low blood sugar:   Low Blood Sugar Symptoms:  o Sweaty  o Dizzy  o Rapid heartbeat  o Shaky  o Bad mood  o Hungry    2. Treat blood sugar less than 70 with 15 grams of fast-acting carbohydrate:   Examples of 15 grams Fast-Acting Carbohydrate:  o 4 oz juice/ 4 oz regular soda  o 1 tablespoon honey  o 1 pack of fun size skittles (about 15 individual skittles)  o 12 gummy bears  o 4 starburst   o 3-4 hard candies (chew)  o 3-4 glucose tablets (chew)            3.   Wait 15 minutes and test your blood sugar again         4.   If blood sugar is less than 100, repeat steps 2-3.    5.   When your blood sugar is 100 or more, eat a snack if it will be longer than one hour until your next meal. The snack should be 15 grams of carbohydrate and a protein:   Examples of snacks:  o ½ sandwich  o 6 crackers with cheese or with peanut butter  o Piece of fruit with cheese or peanut butter

## 2024-11-06 LAB
LEFT EYE DIABETIC RETINOPATHY: POSITIVE
RIGHT EYE DIABETIC RETINOPATHY: POSITIVE
SEVERITY (EYE EXAM): NORMAL

## 2024-11-12 ENCOUNTER — TELEPHONE (OUTPATIENT)
Age: 74
End: 2024-11-12

## 2024-11-12 DIAGNOSIS — E11.9 DIABETES MELLITUS TYPE 2 IN NONOBESE (HCC): ICD-10-CM

## 2024-11-12 RX ORDER — INSULIN GLARGINE 100 [IU]/ML
28 INJECTION, SOLUTION SUBCUTANEOUS
Qty: 15 ML | Refills: 1 | Status: SHIPPED | OUTPATIENT
Start: 2024-11-12

## 2024-11-12 NOTE — TELEPHONE ENCOUNTER
Called patient back. - relayed Megan's message    Increase Lantus up to 28 units nightly. Continue jardiance 25 mg daily.    Call the office for blood glucose levels less than 70 mg/dL or persistent patterns over 250 mg/dL.

## 2024-11-12 NOTE — TELEPHONE ENCOUNTER
Patient states she had an appointment on 10/30, at that time her metformin was discontinued and her Lantus was decreased from 30 units to 25 units.  She states since then her BG has been much higher than her usual. She is currently taking Lantus 25 units at bedtime and Jardiance 25 mg daily.    11/8    11/9    11/10   11/11    11/12     Patient asking if she should increase Lantus back to 30 units? No symptoms present  Please advise

## 2024-11-13 ENCOUNTER — OFFICE VISIT (OUTPATIENT)
Dept: HEMATOLOGY ONCOLOGY | Facility: CLINIC | Age: 74
End: 2024-11-13
Payer: COMMERCIAL

## 2024-11-13 VITALS
OXYGEN SATURATION: 98 % | SYSTOLIC BLOOD PRESSURE: 128 MMHG | RESPIRATION RATE: 17 BRPM | HEART RATE: 73 BPM | HEIGHT: 58 IN | BODY MASS INDEX: 28.76 KG/M2 | TEMPERATURE: 98.1 F | DIASTOLIC BLOOD PRESSURE: 80 MMHG | WEIGHT: 137 LBS

## 2024-11-13 DIAGNOSIS — E11.9 DIABETES MELLITUS TYPE 2 IN NONOBESE (HCC): ICD-10-CM

## 2024-11-13 DIAGNOSIS — D50.0 IRON DEFICIENCY ANEMIA DUE TO CHRONIC BLOOD LOSS: Primary | ICD-10-CM

## 2024-11-13 PROCEDURE — 99213 OFFICE O/P EST LOW 20 MIN: CPT | Performed by: PHYSICIAN ASSISTANT

## 2024-11-13 RX ORDER — LANCETS 33 GAUGE
EACH MISCELLANEOUS 3 TIMES DAILY
Qty: 300 EACH | Refills: 1 | Status: SHIPPED | OUTPATIENT
Start: 2024-11-13

## 2024-11-13 NOTE — PROGRESS NOTES
[facility] HEMATOLOGY ONCOLOGY SPECIALISTS [place]  [address] [facility] [address]  Hematology Ambulatory Follow-Up  Iman Walden, 1950, [id number]  11/13/2024      Assessment and Plan     74-year-old female with history of CKD, cirrhosis who is seen in follow up today for iron deficiency anemia. She has history of anemia from bleeding esophageal varices in 2021. Hgb normalized in 2022 and anemia reoccurred in 2023 and hgb has been steadily declining. When patient was seen in consultation 05/2024 her hemoglobin was 7.3.  Labs are consistent with iron deficiency anemia and she was started on oral supplements, taking 3x per week.  She refused IV iron.  Pt had reported melena and hemoccult study was positive.  She underwent EGD in August which showed multiple erythematous/hemorrhagic and pedunculated polyps. Biopsies were all benign. This was repeated 10/2024 which showed benign-appearing, semipedunculated polyps.  Pathology at that time showed plastic polyp with focal intestinal metaplasia and low-grade dysplasia.    Her most recent CBC from 10/23/2024 showed hemoglobin 1.5, MCV 88, platelets 151,000.  Iron 189, iron saturation 59%, ferritin 24, UIBC low.     Discussion/plan  WENDY resolved.  Her last iron saturation was high although she believes she took iron prior to lab draw.  Recommend to repeat iron panel fasting.  May continue oral supplements for now.  CBC, Iron panel in    Continue follow up with GI as planned for recent findings of low grade dysplasia gastric polyps   RTC 4 months patient was advised to notify our office immediately if she has any bleeding as she will need labs sooner to monitor her anemia.  If she has any significant bleeding and is symptomatic (fatigue, lightheadedness, dizziness, shortness of breath, mere syncope) she should go to the emergency room immediately.    Patient voiced agreement and understanding to the above.    Patient advised to call the Hematology/Oncology office with any questions and concerns regarding the above.    Barrier(s) to care: None  The patient is able to self care.    Michelle Velazquez PA-C   Medical Oncology/Hematology  [facility]    Subjective     Chief Complaint   Patient presents with    Follow-up       History of present illness: 74-year-old female with past medical history of alcohol use, hepatitis C, cirrhosis, CKD stage III, type 2 diabetes complicated by peripheral neuropathy and hypertension who has been referred by her PCP for evaluation and management of anemia.     Patient reports she has been anemic for many years. There are limited data points in EMR available for review.  Does appear that the patient has had anemia since at least 2021.  Around this time she was hospitalized for fall with subsequent compression fracture.  She was found to have cirrhosis during admission and had a upper GI bleed due to bleeding varices.  She does report history of alcohol use and hepatitis C.  She received her only blood transfusion of lites during this hospitalization.  Follow-up labs in 2022 showed normal hemoglobin in the 11-12 range   Until April 2023 where she developed recurrent anemia.  Hemoglobin at this time was around 9 g/dL.  Her most recent labs from February 2024 show worsening anemia with hemoglobin of 7.3 hemoglobin of 7.3, labs consistent with a microcytic anemia.  WBC and platelet count are normal however differential does show eosinophilia with absolute significant count of 1.02. There is no recent iron panel available for review.  Last ferritin level was low in 2020.  Recent B12 level 761.     She denies history of malabsorption condition, hematochezia, melena, hematuria or vaginal bleeding.  She is on oral iron every other day and has been on this regimen for several months.  No history of IV iron.  She is on PPI chronically.     Rare alcohol use. No tobacco use.  She is on medical marijuana vape. No other drug use.       No history of thyroid cancer. No pernasal history of cancer   Mothers aunt had breast cancer, Maternal grandmother skin cancer, maternal grandfather unknown CA type      Last EGD 09/2022: Nonbleeding small esophageal varices. Multiple inflamed gastric polyps possibly the cause of hematemesis. Duodenal polyp possibly Brunner's gland.  Pathology showed hyperplastic polyp.  Last colonoscopy 05/2023: polyps      02/2024: WBC 7.2, hemoglobin 7.3, MCV 72, RDW 17, platelets 209,000.  Creatinine 1.39, EGFR 38.     05/2024: WBC 6.57, hemoglobin 9.0 g/dL, MCV 77, RDW 20.6, platelets 205,000. eGFR 35. .  LD low.  Haptoglobin normal.  reticulocyte count 2%.  FIT  positive +. started oral iron therapy     06/2024: hemoglobin 9.7g/dL, MCV 82, RDW 19.0 platelets 153,000.  Iron saturation 8%, UIBC normal, ferritin 22.  Increased from prior ferritin level of 9. eGFR45.  Patient had maroon stools approximately 2 weeks ago.  Now resolved.  Denies abdominal pain, shortness of breath, fatigue, dizziness or lightheadedness.  She has not observed any bright red blood per rectum.  Has EGD planned for next month.  On oral iron 3 times a week.     08/2024 EGD showed 10 or more pedunculated polyps in 10 to 19 mm in the stomach; bleeding occurred after intervention.  There were multiple erythematous/hemorrhagic polyps scattered throughout the antrum, body and fundus of the stomach.  Some of these polyps were sessile, others were pedunculated or pseudo pedunculated.  There were additional polyps seen in the prepyloric, body of the stomach,and duodenum. Biopsy showed benign polyps. No celiac or malignancy.     10/2024 EGD showed multiple benign-appearing and semi-pedunculated polyps measuring 5-9 mm in the cardia, fundus of the stomach and body of the stomac. Gastric polyp bx c/w Hyperplastic polyps with focal intestinal metaplasia and low grade dysplasia. Plan for repeat EGD in  01/2025.     11/13/24 :  Lab Results   Component Value Date    IRON 189 10/23/2024    TIBC 319 10/23/2024    FERRITIN 24 10/23/2024     Lab Results   Component Value Date    WBC 5.91 10/23/2024    HGB 11.5 10/23/2024    HCT 35.1 10/23/2024    MCV 88 10/23/2024     10/23/2024       Interval history: Overall feeling well.  On oral iron tolerating without any GI side effects.  Denies any bleeding.    Review of Systems   All other systems reviewed and are negative.      Patient Active Problem List   Diagnosis    Compression fracture of lumbar vertebra (HCC)    Alcoholic cirrhosis of liver without ascites (HCC)    Diabetes mellitus type 2 in nonobese (HCC)    Secondary esophageal varices with bleeding (HCC)    Continuous opioid dependence (HCC)    Diabetic peripheral neuropathy (HCC)    Stage 3 chronic kidney disease, unspecified whether stage 3a or 3b CKD (HCC)    Hypertension secondary to other renal disorders    Hypertension associated with stage 3b chronic kidney disease due to type 2 diabetes mellitus (HCC)    Mixed hyperlipidemia    Secondary hyperparathyroidism of renal origin (HCC)    Age-related osteoporosis without current pathological fracture     Past Medical History:   Diagnosis Date    Acute blood loss anemia 02/20/2021    Acute embolism and thrombosis of other specified deep vein of right lower extremity (HCC) 11/16/2022    ALC (alcoholic liver cirrhosis) (HCC)     Chronic kidney disease     Colon polyp     Diabetes mellitus (HCC)     Edema 02/23/2021    HLD (hyperlipidemia)     Hypertension     Liver disease     Neuropathy     Neuropathy 02/20/2021    Pleural effusion 02/20/2021    Seasonal allergies     Thrombocytopenia (HCC)      Past Surgical History:   Procedure Laterality Date    BREAST BIOPSY      CHOLECYSTECTOMY      NASAL SEPTUM SURGERY       Family History   Problem Relation Age of Onset    Emphysema Mother     Heart attack Father     Heart disease Father      Social History      Socioeconomic History    Marital status: /Civil Union     Spouse name: Not on file    Number of children: Not on file    Years of education: Not on file    Highest education level: Not on file   Occupational History    Not on file   Tobacco Use    Smoking status: Never     Passive exposure: Never    Smokeless tobacco: Never   Vaping Use    Vaping status: Never Used   Substance and Sexual Activity    Alcohol use: Never    Drug use: Yes     Frequency: 7.0 times per week     Types: Marijuana     Comment:  unknown    Sexual activity: Yes     Partners: Male   Other Topics Concern    Not on file   Social History Narrative    Not on file     Social Drivers of Health     Financial Resource Strain: Low Risk  (11/22/2023)    Overall Financial Resource Strain (CARDIA)     Difficulty of Paying Living Expenses: Not hard at all   Food Insecurity: Not on file   Transportation Needs: No Transportation Needs (11/22/2023)    PRAPARE - Transportation     Lack of Transportation (Medical): No     Lack of Transportation (Non-Medical): No   Physical Activity: Not on file   Stress: Not on file   Social Connections: Not on file   Intimate Partner Violence: Not on file   Housing Stability: Not on file       Current Outpatient Medications:     atorvastatin (LIPITOR) 20 mg tablet, TAKE 1 TABLET BY MOUTH EVERY DAY, Disp: 90 tablet, Rfl: 0    calcitriol (ROCALTROL) 0.25 mcg capsule, Take 1 capsule (0.25 mcg total) by mouth 3 (three) times a week, Disp: 40 capsule, Rfl: 2    CALCIUM PO, Take by mouth, Disp: , Rfl:     carvedilol (COREG) 3.125 mg tablet, TAKE 1 TABLET TWICE A DAY WITH MEALS, Disp: 180 tablet, Rfl: 1    ferrous sulfate 325 (65 Fe) mg tablet, Take 1 tablet (325 mg total) by mouth every other day, Disp: 90 tablet, Rfl: 1    furosemide (LASIX) 40 mg tablet, TAKE 1 TABLET DAILY, Disp: 100 tablet, Rfl: 1    gabapentin (NEURONTIN) 300 mg capsule, Take 2 capsules (600 mg total) by mouth 3 (three) times a day, Disp: 540 capsule,  "Rfl: 1    Jardiance 25 MG TABS, TAKE 1 TABLET DAILY, Disp: 90 tablet, Rfl: 3    Lancets (OneTouch Delica Plus [id number]) MISC, USE THREE TIMES A DAY, Disp: 300 each, Rfl: 2    Lantus SoloStar 100 units/mL SOPN, Inject 0.28 mL (28 Units total) under the skin daily at bedtime, Disp: 15 mL, Rfl: 1    loratadine (CLARITIN) 10 mg tablet, TAKE 1 TABLET BY MOUTH EVERY DAY, Disp: 90 tablet, Rfl: 1    MULTIPLE VITAMIN PO, Take 1 tablet by mouth, Disp: , Rfl:     OneTouch Ultra test strip, , Disp: , Rfl:     pantoprazole (PROTONIX) 40 mg tablet, TAKE 1 TABLET BY MOUTH EVERY DAY, Disp: 90 tablet, Rfl: 1    sodium chloride (EAMON 128) 5 % hypertonic ophthalmic solution, Administer 1 drop to both eyes every evening, Disp: , Rfl:     traMADol (ULTRAM) 50 mg tablet, Take 1 tablet (50 mg total) by mouth daily as needed for severe pain, Disp: 30 tablet, Rfl: 0    metFORMIN (GLUCOPHAGE) 500 mg tablet, TAKE 1 TABLET THREE TIMES A DAY (Patient not taking: Reported on 11/13/2024), Disp: 270 tablet, Rfl: 1  Allergies   Allergen Reactions    Crab (Diagnostic) - Food Allergy Anaphylaxis    Pollen Extract Other (See Comments)       Objective   Temp 98.1 °F (36.7 °C) (Temporal)   Ht 4' 10\" (1.473 m)   Wt 62.1 kg (137 lb)   BMI 28.63 kg/m²    Physical Exam  Vitals reviewed.   HENT:      Head: Normocephalic.   Cardiovascular:      Rate and Rhythm: Normal rate and regular rhythm.   Pulmonary:      Effort: Pulmonary effort is normal.      Breath sounds: Normal breath sounds.   Abdominal:      Palpations: Abdomen is soft.      Tenderness: There is no abdominal tenderness.   Musculoskeletal:      Cervical back: Neck supple.   Skin:     Findings: No rash.   Neurological:      Mental Status: She is alert.         Result Review  Labs:  Appointment on 10/23/2024   Component Date Value Ref Range Status    Hemoglobin A1C 10/23/2024 7.5 (H)  Normal 4.0-5.6%; PreDiabetic 5.7-6.4%; Diabetic >=6.5%; Glycemic control for adults with diabetes <7.0% % Final "    EAG 10/23/2024 169  mg/dl Final    Sodium 10/23/2024 140  135 - 147 mmol/L Final    Potassium 10/23/2024 4.4  3.5 - 5.3 mmol/L Final    Chloride 10/23/2024 100  96 - 108 mmol/L Final    CO2 10/23/2024 29  21 - 32 mmol/L Final    ANION GAP 10/23/2024 11  4 - 13 mmol/L Final    BUN 10/23/2024 19  5 - 25 mg/dL Final    Creatinine 10/23/2024 1.42 (H)  0.60 - 1.30 mg/dL Final    Standardized to IDMS reference method    Glucose, Fasting 10/23/2024 120 (H)  65 - 99 mg/dL Final    Calcium 10/23/2024 8.8  8.4 - 10.2 mg/dL Final    Corrected Calcium 10/23/2024 9.5  8.3 - 10.1 mg/dL Final    AST 10/23/2024 18  13 - 39 U/L Final    ALT 10/23/2024 14  7 - 52 U/L Final    Specimen collection should occur prior to Sulfasalazine administration due to the potential for falsely depressed results.     Alkaline Phosphatase 10/23/2024 56  34 - 104 U/L Final    Total Protein 10/23/2024 6.6  6.4 - 8.4 g/dL Final    Albumin 10/23/2024 3.1 (L)  3.5 - 5.0 g/dL Final    Total Bilirubin 10/23/2024 0.49  0.20 - 1.00 mg/dL Final    Use of this assay is not recommended for patients undergoing treatment with eltrombopag due to the potential for falsely elevated results.  N-acetyl-p-benzoquinone imine (metabolite of Acetaminophen) will generate erroneously low results in samples for patients that have taken an overdose of Acetaminophen.    eGFR 10/23/2024 36  ml/min/1.73sq m Final    WBC 10/23/2024 5.91  4.31 - 10.16 Thousand/uL Final    RBC 10/23/2024 3.99  3.81 - 5.12 Million/uL Final    Hemoglobin 10/23/2024 11.5  11.5 - 15.4 g/dL Final    Hematocrit 10/23/2024 35.1  34.8 - 46.1 % Final    MCV 10/23/2024 88  82 - 98 fL Final    MCH 10/23/2024 28.8  26.8 - 34.3 pg Final    MCHC 10/23/2024 32.8  31.4 - 37.4 g/dL Final    RDW 10/23/2024 13.9  11.6 - 15.1 % Final    MPV 10/23/2024 9.7  8.9 - 12.7 fL Final    Platelets 10/23/2024 151  149 - 390 Thousands/uL Final    nRBC 10/23/2024 0  /100 WBCs Final    Segmented % 10/23/2024 70  43 - 75 % Final     Immature Grans % 10/23/2024 0  0 - 2 % Final    Lymphocytes % 10/23/2024 11 (L)  14 - 44 % Final    Monocytes % 10/23/2024 6  4 - 12 % Final    Eosinophils Relative 10/23/2024 12 (H)  0 - 6 % Final    Basophils Relative 10/23/2024 1  0 - 1 % Final    Absolute Neutrophils 10/23/2024 4.12  1.85 - 7.62 Thousands/µL Final    Absolute Immature Grans 10/23/2024 0.02  0.00 - 0.20 Thousand/uL Final    Absolute Lymphocytes 10/23/2024 0.64  0.60 - 4.47 Thousands/µL Final    Absolute Monocytes 10/23/2024 0.35  0.17 - 1.22 Thousand/µL Final    Eosinophils Absolute 10/23/2024 0.73 (H)  0.00 - 0.61 Thousand/µL Final    Basophils Absolute 10/23/2024 0.05  0.00 - 0.10 Thousands/µL Final    Iron Saturation 10/23/2024 59 (H)  15 - 50 % Final    TIBC 10/23/2024 319  250 - 450 ug/dL Final    Iron 10/23/2024 189  50 - 212 ug/dL Final    Patients treated with metal-binding drugs (ie. Deferoxamine) may have depressed iron values.    UIBC 10/23/2024 130 (L)  155 - 355 ug/dL Final    Ferritin 10/23/2024 24  11 - 307 ng/mL Final    Color, UA 10/23/2024 Light Yellow   Final    Clarity, UA 10/23/2024 Clear   Final    Specific Gravity, UA 10/23/2024 1.016  1.003 - 1.030 Final    High concentrations of glucose or protein may affect the specific gravity    pH, UA 10/23/2024 5.5  4.5, 5.0, 5.5, 6.0, 6.5, 7.0, 7.5, 8.0 Final    Leukocytes, UA 10/23/2024 Elevated glucose may cause decreased leukocyte values. See urine microscopic for UWBC result (A)  Negative Final    Nitrite, UA 10/23/2024 Negative  Negative Final    Protein, UA 10/23/2024 Negative  Negative mg/dl Final    Glucose, UA 10/23/2024 >=1000 (1%) (A)  Negative mg/dl Final    Elevated glucose may cause false negative leukocyte esterase    Ketones, UA 10/23/2024 Negative  Negative mg/dl Final    Urobilinogen, UA 10/23/2024 <2.0  <2.0 mg/dl mg/dl Final    Bilirubin, UA 10/23/2024 Negative  Negative Final    Occult Blood, UA 10/23/2024 Negative  Negative Final    RBC, UA 10/23/2024 None  Seen  None Seen, 1-2 /hpf Final    WBC, UA 10/23/2024 None Seen  None Seen, 1-2 /hpf Final    Epithelial Cells 10/23/2024 Occasional  None Seen, Occasional /hpf Final    Bacteria, UA 10/23/2024 None Seen  None Seen, Occasional /hpf Final    MUCUS THREADS 10/23/2024 Occasional (A)  None Seen Final    Creatinine, Ur 10/23/2024 47.8  Reference range not established. mg/dL Final    Albumin,U,Random 10/23/2024 <7.0  <20.0 mg/L Final    Albumin Creat Ratio 10/23/2024    Final    Unable to calculate.   Hospital Outpatient Visit on 10/15/2024   Component Date Value Ref Range Status    POC Glucose 10/15/2024 106  65 - 140 mg/dl Final    Case Report 10/15/2024    Final                    Value:Surgical Pathology Report                         Case: [id number]                                  Authorizing Provider:  Azael Goncalves DO        Collected:           10/15/2024 0803              Ordering Location:     [facility] Received:            10/15/2024 1414                                     Endoscopy                                                                    Pathologist:           Melisa Ball MD                                                                 Specimen:    Stomach                                                                                    Final Diagnosis 10/15/2024    Final                    Value:A. Stomach, polyps:  - Hyperplastic polyps with focal intestinal metaplasia and low grade dysplasia.  - No high grade dysplasia and no evidence of malignancy.    Note: Special stain for alcian blue/PAS highlights benign glands (A1-A6).  Clinical follow-up is recommended if clinical indicated.      Note 10/15/2024    Final                    Value:Interpretation performed at [facility], [address] [facility] [address]        Additional Information 10/15/2024    Final                    Value:All reported additional testing was performed with appropriately reactive controls.   "These tests were developed and their performance characteristics determined by [facility] or appropriate performing facility, though some tests may be performed on tissues which have not been validated for performance characteristics (such as staining performed on alcohol exposed cell blocks and decalcified tissues).  Results should be interpreted with caution and in the context of the patients’ clinical condition. These tests may not be cleared or approved by the U.S. Food and Drug Administration, though the FDA has determined that such clearance or approval is not necessary. These tests are used for clinical purposes and they should not be regarded as investigational or for research. This laboratory has been approved by [place] 88, designated as a high-complexity laboratory and is qualified to perform these tests.  .      Gross Description 10/15/2024    Final                    Value:A. The specimen is received in formalin, labeled with the patient's name and hospital number, and is designated \" stomach\".  The specimen consists of 2 tan-brown friable polypoid portions of soft tissue measuring 2.8 and 3.0 cm in greatest dimension.  The presumed margins of resection are inked blue and the specimens are serially sectioned.  Entirely submitted. Six screened cassettes.  A1-3: Polyp measuring 2.8 x 1.5 x 1.0 cm, trisected.  A4-6: Polyp measuring 3.0 x 1.0 x 0.8 cm, trisected.    Note: The estimated total formalin fixation time based upon information provided by the submitting clinician and the standard processing schedule is under 72 hours.    -[facility]      Clinical Information 10/15/2024    Final                    Value:Hot snare r/o adenoma    · The cricopharynx, upper third of the esophagus, middle third of the esophagus, lower third of the esophagus, GE junction and Z-line appeared normal.  · Multiple polyps measuring 5-9 mm in the cardia, fundus of the stomach and body of the stomach; bleeding " occurred after intervention; performed hot snare removal  · The duodenal bulb and 2nd part of the duodenum appeared normal.         Imaging:   I reviewed relevant imaging    Please note:  This report has been generated by a voice recognition software system. Therefore there may be syntax, spelling, and/or grammatical errors. Please call if you have any questions.

## 2024-11-13 NOTE — PATIENT INSTRUCTIONS
PLEASE REPEAT IRON PANEL AT EARLIEST CONVENIENCE. PLEASE DO NOT TAKE ORAL IRON THE MORNING OF THE STUDY

## 2024-11-27 ENCOUNTER — APPOINTMENT (OUTPATIENT)
Age: 74
End: 2024-11-27
Payer: COMMERCIAL

## 2024-11-27 DIAGNOSIS — D50.0 IRON DEFICIENCY ANEMIA DUE TO CHRONIC BLOOD LOSS: ICD-10-CM

## 2024-11-27 LAB
FERRITIN SERPL-MCNC: 43 NG/ML (ref 11–307)
IRON SATN MFR SERPL: 26 % (ref 15–50)
IRON SERPL-MCNC: 80 UG/DL (ref 50–212)
TIBC SERPL-MCNC: 302 UG/DL (ref 250–450)
UIBC SERPL-MCNC: 222 UG/DL (ref 155–355)

## 2024-11-27 PROCEDURE — 36415 COLL VENOUS BLD VENIPUNCTURE: CPT

## 2024-11-27 PROCEDURE — 83540 ASSAY OF IRON: CPT

## 2024-11-27 PROCEDURE — 82728 ASSAY OF FERRITIN: CPT

## 2024-11-27 PROCEDURE — 83550 IRON BINDING TEST: CPT

## 2024-11-29 ENCOUNTER — RESULTS FOLLOW-UP (OUTPATIENT)
Dept: HEMATOLOGY ONCOLOGY | Facility: CLINIC | Age: 74
End: 2024-11-29

## 2024-11-29 NOTE — RESULT ENCOUNTER NOTE
Patient notified of Michelle's message, verbalized understanding and was appreciative of phone call.

## 2024-12-04 ENCOUNTER — RA CDI HCC (OUTPATIENT)
Dept: OTHER | Facility: HOSPITAL | Age: 74
End: 2024-12-04

## 2024-12-09 ENCOUNTER — TELEPHONE (OUTPATIENT)
Age: 74
End: 2024-12-09

## 2024-12-09 NOTE — TELEPHONE ENCOUNTER
Patient called in said that she had recently increased her Lantus to 28 units daily, but then she said her blood sugars started getting higher again in the 300s, so she said she went ahead and increased it to 30 units daily.  She said last night blood sugar was 162 and this am was 204.  She just wanted to let Megan know.

## 2024-12-11 ENCOUNTER — OFFICE VISIT (OUTPATIENT)
Dept: FAMILY MEDICINE CLINIC | Facility: CLINIC | Age: 74
End: 2024-12-11
Payer: COMMERCIAL

## 2024-12-11 VITALS
OXYGEN SATURATION: 96 % | BODY MASS INDEX: 28.38 KG/M2 | HEART RATE: 94 BPM | WEIGHT: 135.2 LBS | HEIGHT: 58 IN | TEMPERATURE: 97 F | SYSTOLIC BLOOD PRESSURE: 122 MMHG | DIASTOLIC BLOOD PRESSURE: 64 MMHG

## 2024-12-11 DIAGNOSIS — K70.30 ALCOHOLIC CIRRHOSIS OF LIVER WITHOUT ASCITES (HCC): Primary | ICD-10-CM

## 2024-12-11 DIAGNOSIS — D50.8 OTHER IRON DEFICIENCY ANEMIA: ICD-10-CM

## 2024-12-11 DIAGNOSIS — E11.9 DIABETES MELLITUS TYPE 2 IN NONOBESE (HCC): ICD-10-CM

## 2024-12-11 DIAGNOSIS — E78.2 MIXED HYPERLIPIDEMIA: ICD-10-CM

## 2024-12-11 DIAGNOSIS — I15.1 HYPERTENSION SECONDARY TO OTHER RENAL DISORDERS: ICD-10-CM

## 2024-12-11 DIAGNOSIS — Z00.00 MEDICARE ANNUAL WELLNESS VISIT, SUBSEQUENT: ICD-10-CM

## 2024-12-11 DIAGNOSIS — F11.20 CONTINUOUS OPIOID DEPENDENCE (HCC): ICD-10-CM

## 2024-12-11 PROCEDURE — 99214 OFFICE O/P EST MOD 30 MIN: CPT | Performed by: STUDENT IN AN ORGANIZED HEALTH CARE EDUCATION/TRAINING PROGRAM

## 2024-12-11 PROCEDURE — G0439 PPPS, SUBSEQ VISIT: HCPCS | Performed by: STUDENT IN AN ORGANIZED HEALTH CARE EDUCATION/TRAINING PROGRAM

## 2024-12-11 NOTE — PROGRESS NOTES
Name: Iman Walden      : 1950      MRN: [id number]  Encounter Provider: Kelvin Lorenzo MD  Encounter Date: 2024   Encounter department: [facility] [address]    Assessment & Plan  Alcoholic cirrhosis of liver without ascites (HCC)  Follows with GI, cessation encouraged.  Reviewed endoscopy notes       Medicare annual wellness visit, subsequent         Other iron deficiency anemia  Reviewed hematology note       Mixed hyperlipidemia         Diabetes mellitus type 2 in nonobese (HCC)  Inuslin increased to 30 units and glucose readings mildly better. Would consider reylbelsus and she will think it over  Lab Results   Component Value Date    HGBA1C 7.5 (H) 10/23/2024            Continuous opioid dependence (HCC)  Stable on tramadol monitor for side effects       Hypertension secondary to other renal disorders  Well-controlled today          Preventive health issues were discussed with patient, and age appropriate screening tests were ordered as noted in patient's After Visit Summary. Personalized health advice and appropriate referrals for health education or preventive services given if needed, as noted in patient's After Visit Summary.    History of Present Illness     HPI     Awv    Diabetes: using 30 units and jardiance. Is unsure about reybelsus.  Patient Care Team:  Kelvin Lorenzo MD as PCP - General (Family Medicine)  KRYSTLE Monaco as Nurse Practitioner (Nurse Practitioner)  Michelle Velazquez PA-C as Physician Assistant (Medical Oncology)    Review of Systems   Constitutional:  Negative for chills, fatigue and fever.   HENT:  Negative for rhinorrhea and sore throat.    Eyes:  Negative for visual disturbance.   Respiratory:  Negative for cough and shortness of breath.    Cardiovascular:  Negative for chest pain and palpitations.   Gastrointestinal:  Negative for abdominal pain, constipation, diarrhea, nausea and  vomiting.   Genitourinary:  Negative for difficulty urinating, dysuria and frequency.   Musculoskeletal:  Negative for arthralgias and myalgias.   Skin:  Negative for color change and rash.   Neurological:  Negative for weakness and headaches.     Medical History Reviewed by provider this encounter:       Annual Wellness Visit Questionnaire   Iman is here for her Subsequent Wellness visit.     Health Risk Assessment:   Patient rates overall health as very good. Patient feels that their physical health rating is slightly better. Patient is satisfied with their life. Eyesight was rated as much better. Hearing was rated as same. Patient feels that their emotional and mental health rating is slightly better. Patients states they are sometimes angry. Patient states they are sometimes unusually tired/fatigued. Pain experienced in the last 7 days has been some. Patient's pain rating has been 3/10. Patient states that she has experienced no weight loss or gain in last 6 months.     Depression Screening:   PHQ-2 Score: 0      Fall Risk Screening:   In the past year, patient has experienced: history of falling in past year    Number of falls: 1  Injured during fall?: No    Feels unsteady when standing or walking?: No    Worried about falling?: Yes      Urinary Incontinence Screening:   Patient has not leaked urine accidently in the last six months.     Home Safety:  Patient does not have trouble with stairs inside or outside of their home. Patient has working smoke alarms and has working carbon monoxide detector. Home safety hazards include: none. Has stair lift    Nutrition:   Current diet is Low Carb.     Medications:   Patient is currently taking over-the-counter supplements. OTC medications include: see medication list. Patient is not able to manage medications.  does medications    Activities of Daily Living (ADLs)/Instrumental Activities of Daily Living (IADLs):   Walk and transfer into and out of bed and chair?:  "Yes  Dress and groom yourself?: Yes    Bathe or shower yourself?: No    Feed yourself? Yes  Do your laundry/housekeeping?: No  Manage your money, pay your bills and track your expenses?: No  Make your own meals?: No    Do your own shopping?: No    ADL comments:  assists with showers and other ADLs     Previous Hospitalizations:   Any hospitalizations or ED visits within the last 12 months?: No      Advance Care Planning:   Living will: No    Durable POA for healthcare: No    Advanced directive: No      PREVENTIVE SCREENINGS      Cardiovascular Screening:    General: Screening Not Indicated and History Lipid Disorder      Diabetes Screening:     General: Screening Not Indicated and History Diabetes      Colorectal Cancer Screening:     General: Screening Current      Cervical Cancer Screening:    General: Screening Not Indicated      Osteoporosis Screening:    General: Screening Not Indicated and History Osteoporosis      Lung Cancer Screening:     General: Screening Not Indicated    Screening, Brief Intervention, and Referral to Treatment (SBIRT)    Screening  Typical number of drinks in a day: 0  Typical number of drinks in a week: 0  Interpretation: Low risk drinking behavior.    Single Item Drug Screening:  How often have you used an illegal drug (including marijuana) or a prescription medication for non-medical reasons in the past year? weekly    Single Item Drug Screen Score: 3  Interpretation: POSITIVE screen for possible drug use disorder    Drug Abuse Screening Test (DAST-10):  1) Have you used drugs other than those required for medical reasons? Yes  2) Do you abuse more than one drug at a time? No  3) Are you always able to stop using drugs when you want to? Yes  4) Have you had \"blackouts\" or \"flashbacks\" as a result of drug use? No  5) Do you ever feel bad or guilty about your drug use? No  6) Does your spouse (or parents) ever complain about your involvement with drugs? No  7) Have you neglected " "your family because of your use of drugs? No  8) Have you engaged in illegal activities in order to obtain drugs? No  9) Have you ever experienced withdrawal symptoms (felt sick) when you stopped taking drugs? No  10) Have you had medical problems as a result of your drug use (e.g., memory loss, hepatitis, convulsions, bleeding, etc.)? No    DAST-10 Score: 1  Interpretation: Low level problems related to drug abuse    Social Drivers of Health     Financial Resource Strain: Low Risk  (11/22/2023)    Overall Financial Resource Strain (CARDIA)    • Difficulty of Paying Living Expenses: Not hard at all   Food Insecurity: No Food Insecurity (12/11/2024)    Hunger Vital Sign    • Worried About Running Out of Food in the Last Year: Never true    • Ran Out of Food in the Last Year: Never true   Transportation Needs: No Transportation Needs (12/11/2024)    PRAPARE - Transportation    • Lack of Transportation (Medical): No    • Lack of Transportation (Non-Medical): No   Housing Stability: Low Risk  (12/11/2024)    Housing Stability Vital Sign    • Unable to Pay for Housing in the Last Year: No    • Number of Times Moved in the Last Year: 1    • Homeless in the Last Year: No   Utilities: Not At Risk (12/11/2024)    [facility] Utilities    • Threatened with loss of utilities: No     No results found.    Objective   /64   Pulse 94   Temp (!) 97 °F (36.1 °C) (Tympanic)   Ht 4' 10\" (1.473 m)   Wt 61.3 kg (135 lb 3.2 oz)   SpO2 96%   BMI 28.26 kg/m²     Physical Exam  Constitutional:       Appearance: Normal appearance.   Cardiovascular:      Pulses: no weak pulses.           Dorsalis pedis pulses are 2+ on the right side and 2+ on the left side.        Posterior tibial pulses are 2+ on the right side and 2+ on the left side.   Pulmonary:      Effort: Pulmonary effort is normal. No respiratory distress.   Feet:      Right foot:      Skin integrity: No ulcer, skin breakdown, erythema, warmth, callus or dry skin.      Left foot: "      Skin integrity: No ulcer, skin breakdown, erythema, warmth, callus or dry skin.   Neurological:      Mental Status: She is alert.   Psychiatric:         Mood and Affect: Mood normal.         Behavior: Behavior normal.         Thought Content: Thought content normal.         Patient's shoes and socks removed.    Right Foot/Ankle   Right Foot Inspection  Skin Exam: skin normal and skin intact. No dry skin, no warmth, no callus, no erythema, no maceration, no abnormal color, no pre-ulcer, no ulcer and no callus.     Toe Exam: ROM and strength within normal limits. No swelling, no tenderness, erythema and  no right toe deformity    Sensory   Monofilament testing: intact    Vascular  Capillary refills: < 3 seconds  The right DP pulse is 2+. The right PT pulse is 2+.     Left Foot/Ankle  Left Foot Inspection  Skin Exam: skin normal and skin intact. No dry skin, no warmth, no erythema, no maceration, normal color, no pre-ulcer, no ulcer and no callus.     Toe Exam: ROM and strength within normal limits. No swelling, no tenderness, no erythema and no left toe deformity.     Sensory   Monofilament testing: intact    Vascular  Capillary refills: < 3 seconds  The left DP pulse is 2+. The left PT pulse is 2+.     Assign Risk Category  No deformity present  No loss of protective sensation  No weak pulses  Risk: 0

## 2024-12-11 NOTE — PROGRESS NOTES
Diabetic Foot Exam    Patient's shoes and socks removed.    Right Foot/Ankle   Right Foot Inspection  Skin Exam: skin normal and skin intact. No dry skin, no warmth, no callus, no erythema, no maceration, no abnormal color, no pre-ulcer, no ulcer and no callus.     Toe Exam: ROM and strength within normal limits.     Vascular  The right DP pulse is 2+. The right PT pulse is 2+.     Left Foot/Ankle  Left Foot Inspection  Skin Exam: skin normal and skin intact. No dry skin, no warmth, no erythema, no maceration, normal color, no pre-ulcer, no ulcer and no callus.     Toe Exam: ROM and strength within normal limits.     Vascular  The left DP pulse is 2+. The left PT pulse is 2+.     Assign Risk Category  No deformity present  No weak pulses  {Diabetic Foot Exam Documentation Incomplete}  Name: Iman Walden      : 1950      MRN: [id number]  Encounter Provider: Kelvin Lorenzo MD  Encounter Date: 2024   Encounter department: [address]    Assessment & Plan       Preventive health issues were discussed with patient, and age appropriate screening tests were ordered as noted in patient's After Visit Summary. Personalized health advice and appropriate referrals for health education or preventive services given if needed, as noted in patient's After Visit Summary.    History of Present Illness   {?Quick Links Encounters * My Last Note * Last Note in Specialty * Snapshot * Since Last Visit * History :72491}  [facility]   Patient Care Team:  Kelvin Lorenzo MD as PCP - General (Family Medicine)  KRYSTLE Monaco as Nurse Practitioner (Nurse Practitioner)  Michelle Velazquez PA-C as Physician Assistant (Medical Oncology)    Review of Systems  Medical History Reviewed by provider this encounter:       Annual Wellness Visit Questionnaire   Iman is here for her Subsequent Wellness visit.     Health Risk Assessment:   Patient rates  overall health as fair. Patient feels that their physical health rating is slightly better. Patient is satisfied with their life. Eyesight was rated as same. Hearing was rated as same. Patient feels that their emotional and mental health rating is same. Patients states they are sometimes angry. Patient states they are sometimes unusually tired/fatigued. Pain experienced in the last 7 days has been some. Patient's pain rating has been 3/10. Patient states that she has experienced no weight loss or gain in last 6 months.     Depression Screening:   PHQ-2 Score: 0      Fall Risk Screening:   In the past year, patient has experienced: history of falling in past year    Number of falls: 1  Injured during fall?: No    Feels unsteady when standing or walking?: No    Worried about falling?: Yes      Urinary Incontinence Screening:   Patient has not leaked urine accidently in the last six months.     Home Safety:  Patient does not have trouble with stairs inside or outside of their home. Patient has working smoke alarms and has working carbon monoxide detector. Home safety hazards include: none. Has a stair lift     Nutrition:   Current diet is Low Carb.     Medications:   Patient is currently taking over-the-counter supplements. OTC medications include: see medication list. Patient is able to manage medications.     Activities of Daily Living (ADLs)/Instrumental Activities of Daily Living (IADLs):   Walk and transfer into and out of bed and chair?: Yes  Dress and groom yourself?: Yes    Bathe or shower yourself?: No    Feed yourself? Yes  Do your laundry/housekeeping?: No  Manage your money, pay your bills and track your expenses?: No  Make your own meals?: No    Do your own shopping?: No    ADL comments: Her  helps her with showers  Has a walker when she walks  Husbands does all household chores, making meals, managing money, shopping, etc     Previous Hospitalizations:   Any hospitalizations or ED visits within the  "last 12 months?: No      Advance Care Planning:   Living will: No    Durable POA for healthcare: No    Advanced directive: No      PREVENTIVE SCREENINGS      Cardiovascular Screening:    General: Screening Not Indicated and History Lipid Disorder      Diabetes Screening:     General: Screening Not Indicated and History Diabetes      Colorectal Cancer Screening:     General: Screening Current      Cervical Cancer Screening:    General: Screening Not Indicated      Osteoporosis Screening:    General: Screening Not Indicated and History Osteoporosis      Lung Cancer Screening:     General: Screening Not Indicated    Screening, Brief Intervention, and Referral to Treatment (SBIRT)    Screening  Typical number of drinks in a day: 0  Typical number of drinks in a week: 0  Interpretation: Low risk drinking behavior.    Single Item Drug Screening:  How often have you used an illegal drug (including marijuana) or a prescription medication for non-medical reasons in the past year? weekly    Single Item Drug Screen Score: 3  Interpretation: POSITIVE screen for possible drug use disorder    Drug Abuse Screening Test (DAST-10):  1) Have you used drugs other than those required for medical reasons? Yes  2) Do you abuse more than one drug at a time? No  3) Are you always able to stop using drugs when you want to? Yes  4) Have you had \"blackouts\" or \"flashbacks\" as a result of drug use? No  5) Do you ever feel bad or guilty about your drug use? No  6) Does your spouse (or parents) ever complain about your involvement with drugs? No  7) Have you neglected your family because of your use of drugs? No  8) Have you engaged in illegal activities in order to obtain drugs? No  9) Have you ever experienced withdrawal symptoms (felt sick) when you stopped taking drugs? No  10) Have you had medical problems as a result of your drug use (e.g., memory loss, hepatitis, convulsions, bleeding, etc.)? No    DAST-10 Score: 1  Interpretation: Low " "level problems related to drug abuse    Social Drivers of Health     Financial Resource Strain: Low Risk  (11/22/2023)    Overall Financial Resource Strain (CARDIA)     Difficulty of Paying Living Expenses: Not hard at all   Transportation Needs: No Transportation Needs (11/22/2023)    PRAPARE - Transportation     Lack of Transportation (Medical): No     Lack of Transportation (Non-Medical): No     No results found.    Objective {?Quick Links Trend Vitals * Enter New Vitals * Results Review * Timeline (Adult) * Labs * Imaging * Cardiology * Procedures * Lung Cancer Screening * Surgical eConsent :88427}  Ht 4' 10\" (1.473 m)   Wt 61.3 kg (135 lb 3.2 oz)   BMI 28.26 kg/m²     Physical Exam  Cardiovascular:      Pulses: no weak pulses.           Dorsalis pedis pulses are 2+ on the right side and 2+ on the left side.        Posterior tibial pulses are 2+ on the right side and 2+ on the left side.   Feet:      Right foot:      Skin integrity: No ulcer, skin breakdown, erythema, warmth, callus or dry skin.      Left foot:      Skin integrity: No ulcer, skin breakdown, erythema, warmth, callus or dry skin.       {Administrative / Billing Section (Optional):21394}  "

## 2024-12-11 NOTE — TELEPHONE ENCOUNTER
Patient was updated on message. She said she just got back from her PCP and she said he told her he is happy with her at the 30 units on lantus. Patient says she doesn't feel comfortable dropping to 28units as she is concerned it will go high again. She said if her staying at the 30 isn't agreed with she can speak with Dr. Lorenzo to discuss it further.

## 2024-12-11 NOTE — ASSESSMENT & PLAN NOTE
Inuslin increased to 30 units and glucose readings mildly better. Would consider deon and she will think it over  Lab Results   Component Value Date    HGBA1C 7.5 (H) 10/23/2024

## 2024-12-11 NOTE — TELEPHONE ENCOUNTER
LVM for patient that Megan would like her to continue 28 Units Lantus nightly.   Call if she has persistent patterns over 250 and lows less then 70. Avoid late night snacking   Call the office if she has any other questions.

## 2024-12-12 NOTE — TELEPHONE ENCOUNTER
Spoke to Iman Sherman. - she can remain on the 30 units but call the office if her BS are lower then 70 or consistent patterns over 250.

## 2024-12-30 DIAGNOSIS — I10 PRIMARY HYPERTENSION: ICD-10-CM

## 2024-12-30 RX ORDER — CARVEDILOL 3.12 MG/1
3.12 TABLET ORAL 2 TIMES DAILY WITH MEALS
Qty: 180 TABLET | Refills: 1 | Status: SHIPPED | OUTPATIENT
Start: 2024-12-30

## 2025-01-02 ENCOUNTER — TELEPHONE (OUTPATIENT)
Dept: NEPHROLOGY | Facility: CLINIC | Age: 75
End: 2025-01-02

## 2025-01-14 DIAGNOSIS — E11.42 DIABETIC PERIPHERAL NEUROPATHY (HCC): ICD-10-CM

## 2025-01-15 RX ORDER — EMPAGLIFLOZIN 25 MG/1
25 TABLET, FILM COATED ORAL DAILY
Qty: 90 TABLET | Refills: 1 | Status: SHIPPED | OUTPATIENT
Start: 2025-01-15

## 2025-01-21 DIAGNOSIS — E11.9 DIABETES MELLITUS TYPE 2 IN NONOBESE (HCC): ICD-10-CM

## 2025-01-21 RX ORDER — ATORVASTATIN CALCIUM 20 MG/1
20 TABLET, FILM COATED ORAL DAILY
Qty: 90 TABLET | Refills: 0 | Status: SHIPPED | OUTPATIENT
Start: 2025-01-21

## 2025-01-28 ENCOUNTER — ANESTHESIA (OUTPATIENT)
Dept: GASTROENTEROLOGY | Facility: HOSPITAL | Age: 75
End: 2025-01-28
Payer: COMMERCIAL

## 2025-01-28 ENCOUNTER — HOSPITAL ENCOUNTER (OUTPATIENT)
Dept: GASTROENTEROLOGY | Facility: HOSPITAL | Age: 75
Setting detail: OUTPATIENT SURGERY
Discharge: HOME | End: 2025-01-28
Attending: INTERNAL MEDICINE
Payer: COMMERCIAL

## 2025-01-28 ENCOUNTER — ANESTHESIA EVENT (OUTPATIENT)
Dept: GASTROENTEROLOGY | Facility: HOSPITAL | Age: 75
End: 2025-01-28
Payer: COMMERCIAL

## 2025-01-28 VITALS
SYSTOLIC BLOOD PRESSURE: 150 MMHG | BODY MASS INDEX: 27.21 KG/M2 | DIASTOLIC BLOOD PRESSURE: 66 MMHG | HEART RATE: 58 BPM | OXYGEN SATURATION: 96 % | TEMPERATURE: 97.7 F | RESPIRATION RATE: 18 BRPM | WEIGHT: 135 LBS | HEIGHT: 59 IN

## 2025-01-28 DIAGNOSIS — K70.30 ALCOHOLIC CIRRHOSIS OF LIVER WITHOUT ASCITES (HCC): ICD-10-CM

## 2025-01-28 DIAGNOSIS — I85.11 SECONDARY ESOPHAGEAL VARICES WITH BLEEDING (HCC): ICD-10-CM

## 2025-01-28 LAB — GLUCOSE SERPL-MCNC: 222 MG/DL (ref 65–140)

## 2025-01-28 PROCEDURE — 43251 EGD REMOVE LESION SNARE: CPT | Performed by: INTERNAL MEDICINE

## 2025-01-28 PROCEDURE — 82948 REAGENT STRIP/BLOOD GLUCOSE: CPT

## 2025-01-28 PROCEDURE — 88342 IMHCHEM/IMCYTCHM 1ST ANTB: CPT | Performed by: PATHOLOGY

## 2025-01-28 PROCEDURE — 88305 TISSUE EXAM BY PATHOLOGIST: CPT | Performed by: PATHOLOGY

## 2025-01-28 PROCEDURE — 88341 IMHCHEM/IMCYTCHM EA ADD ANTB: CPT | Performed by: PATHOLOGY

## 2025-01-28 PROCEDURE — 88313 SPECIAL STAINS GROUP 2: CPT | Performed by: PATHOLOGY

## 2025-01-28 PROCEDURE — 88360 TUMOR IMMUNOHISTOCHEM/MANUAL: CPT | Performed by: PATHOLOGY

## 2025-01-28 RX ORDER — SODIUM CHLORIDE, SODIUM LACTATE, POTASSIUM CHLORIDE, CALCIUM CHLORIDE 600; 310; 30; 20 MG/100ML; MG/100ML; MG/100ML; MG/100ML
INJECTION, SOLUTION INTRAVENOUS CONTINUOUS PRN
Status: DISCONTINUED | OUTPATIENT
Start: 2025-01-28 | End: 2025-01-28

## 2025-01-28 RX ORDER — LIDOCAINE HYDROCHLORIDE 20 MG/ML
INJECTION, SOLUTION EPIDURAL; INFILTRATION; INTRACAUDAL; PERINEURAL AS NEEDED
Status: DISCONTINUED | OUTPATIENT
Start: 2025-01-28 | End: 2025-01-28

## 2025-01-28 RX ORDER — PROPOFOL 10 MG/ML
INJECTION, EMULSION INTRAVENOUS AS NEEDED
Status: DISCONTINUED | OUTPATIENT
Start: 2025-01-28 | End: 2025-01-28

## 2025-01-28 RX ADMIN — PROPOFOL 50 MG: 10 INJECTION, EMULSION INTRAVENOUS at 07:57

## 2025-01-28 RX ADMIN — LIDOCAINE HYDROCHLORIDE 100 MG: 20 INJECTION, SOLUTION EPIDURAL; INFILTRATION; INTRACAUDAL; PERINEURAL at 07:52

## 2025-01-28 RX ADMIN — PROPOFOL 100 MG: 10 INJECTION, EMULSION INTRAVENOUS at 07:52

## 2025-01-28 RX ADMIN — SODIUM CHLORIDE, SODIUM LACTATE, POTASSIUM CHLORIDE, AND CALCIUM CHLORIDE: .6; .31; .03; .02 INJECTION, SOLUTION INTRAVENOUS at 07:45

## 2025-01-28 RX ADMIN — PROPOFOL 20 MG: 10 INJECTION, EMULSION INTRAVENOUS at 08:00

## 2025-01-28 RX ADMIN — PROPOFOL 50 MG: 10 INJECTION, EMULSION INTRAVENOUS at 07:54

## 2025-01-28 NOTE — ANESTHESIA POSTPROCEDURE EVALUATION
Post-Op Assessment Note    CV Status:  Stable  Pain Score: 0    Pain management: adequate       Mental Status:  Sleepy   Hydration Status:  Euvolemic   PONV Controlled:  Controlled   Airway Patency:  Patent and adequate  Two or more mitigation strategies used for obstructive sleep apnea   Post Op Vitals Reviewed: Yes    No anethesia notable event occurred.    Staff: CRNA           Last Filed PACU Vitals:  Vitals Value Taken Time   Temp     Pulse 57    /54    Resp 20    SpO2 98        Modified Evie:     Vitals Value Taken Time   Activity 2 01/28/25 0808   Respiration 2 01/28/25 0808   Circulation 2 01/28/25 0808   Consciousness 1 01/28/25 0808   Oxygen Saturation 2 01/28/25 0808     Modified Evie Score: 9

## 2025-01-28 NOTE — H&P
"History and Physical -  [facility]  Iman Walden 74 y.o. female MRN: [id number]      HPI: Iman Walden is a 74 y.o. year old female who presents for removal of hemorrhagic polyps      REVIEW OF SYSTEMS: Per the HPI, and otherwise unremarkable.    Historical Information   Past Medical History:   Diagnosis Date    Acute blood loss anemia 02/20/2021    Acute embolism and thrombosis of other specified deep vein of right lower extremity (HCC) 11/16/2022    ALC (alcoholic liver cirrhosis) (HCC)     Allergic     Chronic kidney disease     Colon polyp     Diabetes mellitus (HCC)     Edema 02/23/2021    HLD (hyperlipidemia)     Hypertension     Liver disease     Neuropathy     Neuropathy 02/20/2021    Pleural effusion 02/20/2021    Seasonal allergies     Thrombocytopenia (HCC) 02/21/2024     Past Surgical History:   Procedure Laterality Date    BREAST BIOPSY      CHOLECYSTECTOMY      EGD      NASAL SEPTUM SURGERY       Social History   Social History     Substance and Sexual Activity   Alcohol Use Not Currently     Social History     Substance and Sexual Activity   Drug Use Yes    Frequency: 7.0 times per week    Types: Marijuana    Comment:  unknown     Social History     Tobacco Use   Smoking Status Never    Passive exposure: Never   Smokeless Tobacco Never     Family History   Problem Relation Age of Onset    Emphysema Mother     Heart attack Father     Heart disease Father        Meds/Allergies     Not in a hospital admission.    Allergies   Allergen Reactions    Crab (Diagnostic) - Food Allergy Anaphylaxis    Pollen Extract Other (See Comments)       Objective     Blood pressure 166/77, pulse 67, temperature 97.6 °F (36.4 °C), temperature source Temporal, resp. rate 18, height 4' 11\" (1.499 m), weight 61.2 kg (135 lb), SpO2 99%.      PHYSICAL EXAM    Gen: NAD  CV: RRR  CHEST: Clear  ABD: soft, NT/ND  EXT: no edema      ASSESSMENT/PLAN:  This is a 74 y.o. year " old female here for EGD with possible snare polypectomy, and she is stable and optimized for her procedure.

## 2025-01-28 NOTE — ANESTHESIA PREPROCEDURE EVALUATION
Procedure:  EGD    Relevant Problems   CARDIO   (+) Hypertension associated with stage 3b chronic kidney disease due to type 2 diabetes mellitus (HCC)   (+) Hypertension secondary to other renal disorders   (+) Mixed hyperlipidemia   (+) Secondary esophageal varices with bleeding (HCC)      ENDO   (+) Diabetes mellitus type 2 in nonobese (HCC)   (+) Secondary hyperparathyroidism of renal origin (HCC)      GI/HEPATIC   (+) Alcoholic cirrhosis of liver without ascites (HCC)      /RENAL   (+) Hypertension associated with stage 3b chronic kidney disease due to type 2 diabetes mellitus (HCC)   (+) Stage 3 chronic kidney disease, unspecified whether stage 3a or 3b CKD (HCC)      NEURO/PSYCH   (+) Continuous opioid dependence (HCC)   (+) Diabetic peripheral neuropathy (HCC)        Physical Exam    Airway    Mallampati score: II  TM Distance: >3 FB  Neck ROM: full     Dental   No notable dental hx     Cardiovascular      Pulmonary      Other Findings  post-pubertal.      Anesthesia Plan  ASA Score- 3     Anesthesia Type- IV sedation with anesthesia with ASA Monitors.         Additional Monitors:     Airway Plan:            Plan Factors-Exercise tolerance (METS): >4 METS.    Chart reviewed.    Patient summary reviewed.    Patient is not a current smoker.      There is medical exclusion for perioperative obstructive sleep apnea risk education.        Induction- intravenous.    Postoperative Plan-         Informed Consent- Anesthetic plan and risks discussed with patient.  I personally reviewed this patient with the CRNA. Discussed and agreed on the Anesthesia Plan with the CRNA..      NPO Status:  Vitals Value Taken Time   Date of last liquid 01/27/25 01/28/25 0653   Time of last liquid 2130 01/28/25 0653   Date of last solid 01/27/25 01/28/25 0653   Time of last solid 2200 01/28/25 0653

## 2025-01-29 ENCOUNTER — TELEPHONE (OUTPATIENT)
Age: 75
End: 2025-01-29

## 2025-01-29 DIAGNOSIS — E11.9 DIABETES MELLITUS TYPE 2 IN NONOBESE (HCC): ICD-10-CM

## 2025-01-29 DIAGNOSIS — E11.42 DIABETIC PERIPHERAL NEUROPATHY (HCC): ICD-10-CM

## 2025-01-29 DIAGNOSIS — J30.2 SEASONAL ALLERGIES: ICD-10-CM

## 2025-01-29 NOTE — TELEPHONE ENCOUNTER
Patient calling to see when her next appt was. Information provided to patient. No further questions at this time.

## 2025-01-30 ENCOUNTER — TELEPHONE (OUTPATIENT)
Dept: FAMILY MEDICINE CLINIC | Facility: CLINIC | Age: 75
End: 2025-01-30

## 2025-01-30 DIAGNOSIS — E11.9 DIABETES MELLITUS TYPE 2 IN NONOBESE (HCC): ICD-10-CM

## 2025-01-30 RX ORDER — LORATADINE 10 MG/1
10 TABLET ORAL DAILY
Qty: 90 TABLET | Refills: 1 | Status: SHIPPED | OUTPATIENT
Start: 2025-01-30

## 2025-01-30 RX ORDER — GABAPENTIN 300 MG/1
600 CAPSULE ORAL 3 TIMES DAILY
Qty: 540 CAPSULE | Refills: 1 | Status: SHIPPED | OUTPATIENT
Start: 2025-01-30

## 2025-01-30 RX ORDER — INSULIN GLARGINE 100 [IU]/ML
20 INJECTION, SOLUTION SUBCUTANEOUS
Qty: 15 ML | Refills: 2 | Status: SHIPPED | OUTPATIENT
Start: 2025-01-30 | End: 2025-01-31

## 2025-01-30 RX ORDER — INSULIN DEGLUDEC 100 U/ML
INJECTION, SOLUTION SUBCUTANEOUS
Refills: 0 | OUTPATIENT
Start: 2025-01-30

## 2025-01-30 NOTE — TELEPHONE ENCOUNTER
Reason for call:   [x] Prior Auth  [] Other:     Caller:  [] Patient  [x] Pharmacy  Name:   Address:   Callback Number:     Medication:     Insulin Glargine Solostar (Lantus SoloStar) 100 UNIT/ML SOPN       Dose/Frequency:  INJECT 20 UNITS UNDER THE SKIN DAILY AT BEDTIME,     Quantity: 15 mL    Ordering Provider:   [x] PCP/Provider - Kelvin Lorenzo MD   [] Speciality/Provider -

## 2025-01-31 PROCEDURE — 88305 TISSUE EXAM BY PATHOLOGIST: CPT | Performed by: PATHOLOGY

## 2025-01-31 PROCEDURE — 88360 TUMOR IMMUNOHISTOCHEM/MANUAL: CPT | Performed by: PATHOLOGY

## 2025-01-31 PROCEDURE — 88342 IMHCHEM/IMCYTCHM 1ST ANTB: CPT | Performed by: PATHOLOGY

## 2025-01-31 PROCEDURE — 88341 IMHCHEM/IMCYTCHM EA ADD ANTB: CPT | Performed by: PATHOLOGY

## 2025-01-31 PROCEDURE — 88313 SPECIAL STAINS GROUP 2: CPT | Performed by: PATHOLOGY

## 2025-01-31 RX ORDER — INSULIN DEGLUDEC 100 U/ML
20 INJECTION, SOLUTION SUBCUTANEOUS
Qty: 6 ML | Refills: 0 | Status: SHIPPED | OUTPATIENT
Start: 2025-01-31

## 2025-02-03 ENCOUNTER — RESULTS FOLLOW-UP (OUTPATIENT)
Dept: GASTROENTEROLOGY | Facility: CLINIC | Age: 75
End: 2025-02-03

## 2025-02-04 ENCOUNTER — TELEPHONE (OUTPATIENT)
Age: 75
End: 2025-02-04

## 2025-02-05 DIAGNOSIS — E11.9 DIABETES MELLITUS TYPE 2 IN NONOBESE (HCC): ICD-10-CM

## 2025-02-05 RX ORDER — INSULIN GLARGINE 100 [IU]/ML
25 INJECTION, SOLUTION SUBCUTANEOUS
Qty: 15 ML | Refills: 5 | OUTPATIENT
Start: 2025-02-05

## 2025-02-07 ENCOUNTER — TELEPHONE (OUTPATIENT)
Age: 75
End: 2025-02-07

## 2025-02-07 DIAGNOSIS — E11.9 DIABETES MELLITUS TYPE 2 IN NONOBESE (HCC): Primary | ICD-10-CM

## 2025-02-07 RX ORDER — BLOOD-GLUCOSE METER
EACH MISCELLANEOUS
Qty: 1 KIT | Refills: 0 | Status: SHIPPED | OUTPATIENT
Start: 2025-02-07

## 2025-02-07 NOTE — TELEPHONE ENCOUNTER
Patient calling in stating that her One touch ultra glucometer broke on her this morning. She is asking for a new script for one touch ultra glucometer to be sent to [facility]. Please advise, thank you.

## 2025-02-10 DIAGNOSIS — K70.30 ALCOHOLIC CIRRHOSIS OF LIVER WITHOUT ASCITES (HCC): ICD-10-CM

## 2025-02-10 NOTE — TELEPHONE ENCOUNTER
----- Message from Azael Goncalves DO sent at 2/8/2025  3:22 PM EST -----  Patient has not read [employer] message. Please call and share results.

## 2025-02-11 RX ORDER — FUROSEMIDE 40 MG/1
40 TABLET ORAL DAILY
Qty: 100 TABLET | Refills: 1 | Status: SHIPPED | OUTPATIENT
Start: 2025-02-11

## 2025-02-19 ENCOUNTER — APPOINTMENT (OUTPATIENT)
Age: 75
End: 2025-02-19
Payer: COMMERCIAL

## 2025-02-19 DIAGNOSIS — N18.32 HYPERTENSION ASSOCIATED WITH STAGE 3B CHRONIC KIDNEY DISEASE DUE TO TYPE 2 DIABETES MELLITUS (HCC): ICD-10-CM

## 2025-02-19 DIAGNOSIS — N25.81 SECONDARY HYPERPARATHYROIDISM OF RENAL ORIGIN (HCC): ICD-10-CM

## 2025-02-19 DIAGNOSIS — D50.0 IRON DEFICIENCY ANEMIA DUE TO CHRONIC BLOOD LOSS: ICD-10-CM

## 2025-02-19 DIAGNOSIS — I12.9 HYPERTENSION ASSOCIATED WITH STAGE 3B CHRONIC KIDNEY DISEASE DUE TO TYPE 2 DIABETES MELLITUS (HCC): ICD-10-CM

## 2025-02-19 DIAGNOSIS — N18.30 STAGE 3 CHRONIC KIDNEY DISEASE, UNSPECIFIED WHETHER STAGE 3A OR 3B CKD (HCC): ICD-10-CM

## 2025-02-19 DIAGNOSIS — D50.9 IRON DEFICIENCY ANEMIA, UNSPECIFIED IRON DEFICIENCY ANEMIA TYPE: ICD-10-CM

## 2025-02-19 DIAGNOSIS — E11.22 HYPERTENSION ASSOCIATED WITH STAGE 3B CHRONIC KIDNEY DISEASE DUE TO TYPE 2 DIABETES MELLITUS (HCC): ICD-10-CM

## 2025-02-19 DIAGNOSIS — E11.9 DIABETES MELLITUS TYPE 2 IN NONOBESE (HCC): ICD-10-CM

## 2025-02-19 LAB
25(OH)D3 SERPL-MCNC: 57.1 NG/ML (ref 30–100)
ALBUMIN SERPL BCG-MCNC: 3.6 G/DL (ref 3.5–5)
ALP SERPL-CCNC: 66 U/L (ref 34–104)
ALT SERPL W P-5'-P-CCNC: 19 U/L (ref 7–52)
ANION GAP SERPL CALCULATED.3IONS-SCNC: 11 MMOL/L (ref 4–13)
AST SERPL W P-5'-P-CCNC: 24 U/L (ref 13–39)
BASOPHILS # BLD AUTO: 0.04 THOUSANDS/ΜL (ref 0–0.1)
BASOPHILS NFR BLD AUTO: 1 % (ref 0–1)
BILIRUB SERPL-MCNC: 0.85 MG/DL (ref 0.2–1)
BUN SERPL-MCNC: 33 MG/DL (ref 5–25)
CALCIUM SERPL-MCNC: 9.3 MG/DL (ref 8.4–10.2)
CHLORIDE SERPL-SCNC: 99 MMOL/L (ref 96–108)
CO2 SERPL-SCNC: 28 MMOL/L (ref 21–32)
CREAT SERPL-MCNC: 1.85 MG/DL (ref 0.6–1.3)
CREAT UR-MCNC: 40.7 MG/DL
EOSINOPHIL # BLD AUTO: 0.21 THOUSAND/ΜL (ref 0–0.61)
EOSINOPHIL NFR BLD AUTO: 4 % (ref 0–6)
ERYTHROCYTE [DISTWIDTH] IN BLOOD BY AUTOMATED COUNT: 13.2 % (ref 11.6–15.1)
GFR SERPL CREATININE-BSD FRML MDRD: 26 ML/MIN/1.73SQ M
GLUCOSE P FAST SERPL-MCNC: 253 MG/DL (ref 65–99)
HCT VFR BLD AUTO: 37.4 % (ref 34.8–46.1)
HGB BLD-MCNC: 12.9 G/DL (ref 11.5–15.4)
IMM GRANULOCYTES # BLD AUTO: 0.01 THOUSAND/UL (ref 0–0.2)
IMM GRANULOCYTES NFR BLD AUTO: 0 % (ref 0–2)
LYMPHOCYTES # BLD AUTO: 0.42 THOUSANDS/ΜL (ref 0.6–4.47)
LYMPHOCYTES NFR BLD AUTO: 7 % (ref 14–44)
MCH RBC QN AUTO: 30.4 PG (ref 26.8–34.3)
MCHC RBC AUTO-ENTMCNC: 34.5 G/DL (ref 31.4–37.4)
MCV RBC AUTO: 88 FL (ref 82–98)
MICROALBUMIN UR-MCNC: 11.8 MG/L
MICROALBUMIN/CREAT 24H UR: 29 MG/G CREATININE (ref 0–30)
MONOCYTES # BLD AUTO: 0.55 THOUSAND/ΜL (ref 0.17–1.22)
MONOCYTES NFR BLD AUTO: 10 % (ref 4–12)
NEUTROPHILS # BLD AUTO: 4.57 THOUSANDS/ΜL (ref 1.85–7.62)
NEUTS SEG NFR BLD AUTO: 78 % (ref 43–75)
NRBC BLD AUTO-RTO: 0 /100 WBCS
PHOSPHATE SERPL-MCNC: 3.9 MG/DL (ref 2.3–4.1)
PLATELET # BLD AUTO: 134 THOUSANDS/UL (ref 149–390)
PMV BLD AUTO: 10.1 FL (ref 8.9–12.7)
POTASSIUM SERPL-SCNC: 4.1 MMOL/L (ref 3.5–5.3)
PROT SERPL-MCNC: 6.9 G/DL (ref 6.4–8.4)
PTH-INTACT SERPL-MCNC: 154 PG/ML (ref 12–88)
RBC # BLD AUTO: 4.24 MILLION/UL (ref 3.81–5.12)
SODIUM SERPL-SCNC: 138 MMOL/L (ref 135–147)
TSH SERPL DL<=0.05 MIU/L-ACNC: 1.17 UIU/ML (ref 0.45–4.5)
URATE SERPL-MCNC: 7.6 MG/DL (ref 2–7.5)
WBC # BLD AUTO: 5.8 THOUSAND/UL (ref 4.31–10.16)

## 2025-02-19 PROCEDURE — 82570 ASSAY OF URINE CREATININE: CPT

## 2025-02-19 PROCEDURE — 84100 ASSAY OF PHOSPHORUS: CPT

## 2025-02-19 PROCEDURE — 36415 COLL VENOUS BLD VENIPUNCTURE: CPT

## 2025-02-19 PROCEDURE — 85025 COMPLETE CBC W/AUTO DIFF WBC: CPT

## 2025-02-19 PROCEDURE — 84550 ASSAY OF BLOOD/URIC ACID: CPT

## 2025-02-19 PROCEDURE — 82306 VITAMIN D 25 HYDROXY: CPT

## 2025-02-19 PROCEDURE — 84443 ASSAY THYROID STIM HORMONE: CPT

## 2025-02-19 PROCEDURE — 80053 COMPREHEN METABOLIC PANEL: CPT

## 2025-02-19 PROCEDURE — 83970 ASSAY OF PARATHORMONE: CPT

## 2025-02-19 PROCEDURE — 83036 HEMOGLOBIN GLYCOSYLATED A1C: CPT

## 2025-02-19 PROCEDURE — 82043 UR ALBUMIN QUANTITATIVE: CPT

## 2025-02-20 ENCOUNTER — RESULTS FOLLOW-UP (OUTPATIENT)
Dept: HEMATOLOGY ONCOLOGY | Facility: CLINIC | Age: 75
End: 2025-02-20

## 2025-02-20 ENCOUNTER — RESULTS FOLLOW-UP (OUTPATIENT)
Dept: ENDOCRINOLOGY | Facility: CLINIC | Age: 75
End: 2025-02-20

## 2025-02-20 DIAGNOSIS — K70.30 ALCOHOLIC CIRRHOSIS OF LIVER WITHOUT ASCITES (HCC): ICD-10-CM

## 2025-02-20 DIAGNOSIS — D50.0 IRON DEFICIENCY ANEMIA DUE TO CHRONIC BLOOD LOSS: Primary | ICD-10-CM

## 2025-02-20 DIAGNOSIS — D64.9 ANEMIA, UNSPECIFIED TYPE: ICD-10-CM

## 2025-02-20 DIAGNOSIS — F10.11 HISTORY OF ALCOHOL ABUSE: ICD-10-CM

## 2025-02-20 LAB
EST. AVERAGE GLUCOSE BLD GHB EST-MCNC: 235 MG/DL
HBA1C MFR BLD: 9.8 %

## 2025-02-21 RX ORDER — PANTOPRAZOLE SODIUM 40 MG/1
40 TABLET, DELAYED RELEASE ORAL DAILY
Qty: 90 TABLET | Refills: 1 | Status: SHIPPED | OUTPATIENT
Start: 2025-02-21

## 2025-02-25 ENCOUNTER — TELEPHONE (OUTPATIENT)
Age: 75
End: 2025-02-25

## 2025-02-25 NOTE — TELEPHONE ENCOUNTER
Pt called to report that she has a follow up appt scheduled in April and wanted to know if the labs she completed were ok to use. However, it was noted that the lab orders placed back during her last office visit , 09/2024, some where completed in Oct 2024 and others recently. Will she need updated labs for her April appt ? If so, please place the order and let pt know. ty

## 2025-02-25 NOTE — TELEPHONE ENCOUNTER
Received a phone call from patient.  Patient inquiring about lab results.  Reviewed note from Michelle.  Patient verbalized understanding.  Lab order for CBC and diff placed.

## 2025-02-26 ENCOUNTER — OFFICE VISIT (OUTPATIENT)
Age: 75
End: 2025-02-26
Payer: COMMERCIAL

## 2025-02-26 VITALS
DIASTOLIC BLOOD PRESSURE: 62 MMHG | SYSTOLIC BLOOD PRESSURE: 122 MMHG | OXYGEN SATURATION: 99 % | BODY MASS INDEX: 27.21 KG/M2 | TEMPERATURE: 98.1 F | HEIGHT: 59 IN | HEART RATE: 79 BPM | WEIGHT: 135 LBS

## 2025-02-26 DIAGNOSIS — N18.32 HYPERTENSION ASSOCIATED WITH STAGE 3B CHRONIC KIDNEY DISEASE DUE TO TYPE 2 DIABETES MELLITUS (HCC): ICD-10-CM

## 2025-02-26 DIAGNOSIS — E78.2 MIXED HYPERLIPIDEMIA: ICD-10-CM

## 2025-02-26 DIAGNOSIS — I12.9 HYPERTENSION ASSOCIATED WITH STAGE 3B CHRONIC KIDNEY DISEASE DUE TO TYPE 2 DIABETES MELLITUS (HCC): ICD-10-CM

## 2025-02-26 DIAGNOSIS — E11.22 HYPERTENSION ASSOCIATED WITH STAGE 3B CHRONIC KIDNEY DISEASE DUE TO TYPE 2 DIABETES MELLITUS (HCC): ICD-10-CM

## 2025-02-26 DIAGNOSIS — E11.42 DIABETIC PERIPHERAL NEUROPATHY (HCC): ICD-10-CM

## 2025-02-26 DIAGNOSIS — E11.9 DIABETES MELLITUS TYPE 2 IN NONOBESE (HCC): Primary | ICD-10-CM

## 2025-02-26 PROCEDURE — 99214 OFFICE O/P EST MOD 30 MIN: CPT

## 2025-02-26 RX ORDER — REPAGLINIDE 0.5 MG/1
0.5 TABLET ORAL
Qty: 270 TABLET | Refills: 1 | Status: SHIPPED | OUTPATIENT
Start: 2025-02-26

## 2025-02-26 RX ORDER — INSULIN DEGLUDEC 100 U/ML
36 INJECTION, SOLUTION SUBCUTANEOUS
Qty: 45 ML | Refills: 1 | Status: SHIPPED | OUTPATIENT
Start: 2025-02-26

## 2025-02-26 NOTE — PROGRESS NOTES
Name: Iman Walden      : 1950      MRN: [id number]  Encounter Provider: KRYSTLE Neville  Encounter Date: 2025   Encounter department: [facility]  :  Assessment & Plan  Diabetes mellitus type 2 in nonobese (HCC)  A1c dramatically worsened from previous visit.    Increase Tresiba to 36 units nightly  Continue Jardiance 25 mg daily  Cautiously start repaglinide 0.5 mg 3 times a day before meals    She would benefit from GLP-1 agonist class of medication however is poor candidate due to history of bleeding esophageal varices.    Counseled on the long-term effects of hyperglycemia and uncontrolled diabetes including risk for heart attack, stroke, kidney failure, diabetic foot ulcers, limb loss, blindness, and death.  Reviewed worsening kidney function and reduced EGFR on most recent BMP.    Reviewed risks as well as signs and symptoms of hypoglycemia.  Rule of 15's provided in AVS for appropriate treatment.  Call the office for blood glucose levels less than 70 mg/dL persistent patterns over 250 mg/dL.  Continue to check blood glucose levels at least once daily, at different times of the day.    Continue to improve diet and lifestyle including attention to the amount and type of carbohydrates consumed as well as increased physical activity as tolerated.    Follow-up in 3 months.  Labs prior to next visit.  Lab Results   Component Value Date    HGBA1C 9.8 (H) 2025     Orders:  •  insulin degludec (Tresiba FlexTouch) 100 units/mL injection pen; Inject 36 Units under the skin daily at bedtime  •  repaglinide (PRANDIN) 0.5 mg tablet; Take 1 tablet (0.5 mg total) by mouth 3 (three) times a day before meals  •  Basic metabolic panel; Future  •  Hemoglobin A1C; Future    Mixed hyperlipidemia  Continue atorvastatin 20 mg daily.       Diabetic peripheral neuropathy (HCC)  Continue gabapentin.           History of Present  Illness {?Quick Links Encounters * My Last Note * Last Note in Specialty * Snapshot * Since Last Visit * History :39082}  HPI  Iman Walden is a 74 y.o. female who presents  to the office today for follow-up of type 2 diabetes, with long term insulin use. Diabetes course has been unstable. Complications of diabetes include: CKD, neuropathy, retinopathy.  Past medical history is significant for anemia, DVT, bleeding esophageal varices, alcoholic liver cirrhosis, pleural effusion, thrombocytopenia.  She was last seen in the office 10/30/2024 at which time her A1c was 7.5%, metformin was discontinued due to reduced EGFR, and Lantus was decreased.  Her most recent A1c is worsened dramatically to 9.8% to which she attributes to multiple endoscopies since her previous visit.     Denies recent episodes of hypoglycemia.  Symptoms of hypoglycemia include: visual disturbances     Current home glucose monitoring:  Fastin- 275     Current Medication Regimen:   Tresiba 30 units units daily  Jardiance 25 mg daily     Poor candidate for GLP-1 agonist medications due to history of bleeding esophageal varices.     Diabetic Eye Exam: 2024 [facility], positive retinopathy  Follows with Podiatry: Dr. Clinton, needs schedule  Influenza Vaccine: Declines  Has Thyroid Disorder: No  Hypertension, taking: Not on ACEi/ARB  Hyperlipidemia, taking: Lipitor 20 mg daily, Tolerating well with no myalgias  History of Pancreatitis: No  Medic Alert Tag: Recommended  Diabetes Education: Attended      History obtained from: patient    Review of Systems   Constitutional:  Negative for chills and fever.   HENT:  Negative for congestion and sore throat.    Eyes:  Negative for pain and visual disturbance.   Respiratory:  Negative for cough and shortness of breath.    Cardiovascular:  Negative for chest pain and palpitations.   Gastrointestinal:  Negative for abdominal pain, constipation, diarrhea, nausea and vomiting.    Endocrine: Negative for polydipsia and polyuria.   Genitourinary:  Negative for dysuria and hematuria.   Musculoskeletal:  Negative for arthralgias and back pain.   Skin:  Negative for wound.   Neurological:  Negative for syncope and headaches.   Psychiatric/Behavioral:  The patient is not nervous/anxious.      Current Outpatient Medications on File Prior to Visit   Medication Sig Dispense Refill   • atorvastatin (LIPITOR) 20 mg tablet TAKE 1 TABLET BY MOUTH EVERY DAY 90 tablet 0   • Blood Glucose Monitoring Suppl (ONE TOUCH ULTRA 2) w/Device KIT Use to monitor blood glucose levels once daily 1 kit 0   • calcitriol (ROCALTROL) 0.25 mcg capsule Take 1 capsule (0.25 mcg total) by mouth 3 (three) times a week 40 capsule 2   • CALCIUM PO Take by mouth     • carvedilol (COREG) 3.125 mg tablet TAKE 1 TABLET TWICE A DAY WITH MEALS 180 tablet 1   • Empagliflozin (Jardiance) 25 MG TABS TAKE 1 TABLET DAILY 90 tablet 1   • ferrous sulfate 325 (65 Fe) mg tablet Take 1 tablet (325 mg total) by mouth every other day 90 tablet 1   • furosemide (LASIX) 40 mg tablet TAKE 1 TABLET DAILY 100 tablet 1   • gabapentin (NEURONTIN) 300 mg capsule TAKE 2 CAPSULES BY MOUTH 3 TIMES A  capsule 1   • Lancets (OneTouch Delica Plus [id number]) MISC USE THREE TIMES A  each 1   • loratadine (CLARITIN) 10 mg tablet TAKE 1 TABLET BY MOUTH EVERY DAY 90 tablet 1   • MULTIPLE VITAMIN PO Take 1 tablet by mouth     • OneTouch Ultra test strip      • pantoprazole (PROTONIX) 40 mg tablet Take 1 tablet (40 mg total) by mouth daily 90 tablet 1   • sodium chloride (EAMON 128) 5 % hypertonic ophthalmic solution Administer 1 drop to both eyes every evening     • traMADol (ULTRAM) 50 mg tablet Take 1 tablet (50 mg total) by mouth daily as needed for severe pain 30 tablet 0   • [DISCONTINUED] insulin degludec (Tresiba FlexTouch) 100 units/mL injection pen Inject 20 Units under the skin daily at bedtime (Patient taking differently: Inject 30 Units  "under the skin daily at bedtime) 6 mL 0     No current facility-administered medications on file prior to visit.      Social History     Tobacco Use   • Smoking status: Never     Passive exposure: Never   • Smokeless tobacco: Never   Vaping Use   • Vaping status: Every Day   • Substances: THC   Substance and Sexual Activity   • Alcohol use: Not Currently   • Drug use: Yes     Frequency: 7.0 times per week     Types: Marijuana     Comment:  unknown   • Sexual activity: Not Currently     Partners: Male     Birth control/protection: Abstinence        Objective {?Quick Links Trend Vitals * Enter New Vitals * Results Review * Timeline (Adult) * Labs * Imaging * Cardiology * Procedures * Lung Cancer Screening * Surgical eConsent :75087}  /62 (BP Location: Right arm, Patient Position: Sitting, Cuff Size: Standard)   Pulse 79   Temp 98.1 °F (36.7 °C)   Ht 4' 11\" (1.499 m)   Wt 61.2 kg (135 lb)   SpO2 99%   BMI 27.27 kg/m²      Physical Exam  Vitals reviewed.   Constitutional:       General: She is not in acute distress.     Appearance: Normal appearance. She is well-groomed and normal weight.   HENT:      Head: Normocephalic and atraumatic.      Mouth/Throat:      Mouth: Mucous membranes are moist.   Eyes:      Conjunctiva/sclera: Conjunctivae normal.   Cardiovascular:      Rate and Rhythm: Normal rate.   Pulmonary:      Effort: Pulmonary effort is normal. No respiratory distress.   Abdominal:      Palpations: Abdomen is soft.      Tenderness: There is no abdominal tenderness.   Musculoskeletal:         General: No swelling.      Cervical back: Normal range of motion.   Skin:     General: Skin is warm and dry.      Capillary Refill: Capillary refill takes less than 2 seconds.   Neurological:      General: No focal deficit present.      Mental Status: She is alert and oriented to person, place, and time.   Psychiatric:         Mood and Affect: Mood normal.         Behavior: Behavior normal. Behavior is " cooperative.         Thought Content: Thought content normal.         Judgment: Judgment normal.         Administrative Statements {?Quick Links Full Problem List * Level of Service * [facility]:77747}  I have spent a total time of 39 minutes in caring for this patient on the day of the visit/encounter including Diagnostic results, Prognosis, Risks and benefits of tx options, Instructions for management, Patient and family education, Importance of tx compliance, Risk factor reductions, Impressions, Counseling / Coordination of care, Documenting in the medical record, Reviewing/placing orders in the medical record (including tests, medications, and/or procedures), and Obtaining or reviewing history  .

## 2025-02-26 NOTE — ASSESSMENT & PLAN NOTE
A1c dramatically worsened from previous visit.    Increase Tresiba to 36 units nightly  Continue Jardiance 25 mg daily  Cautiously start repaglinide 0.5 mg 3 times a day before meals    She would benefit from GLP-1 agonist class of medication however is poor candidate due to history of bleeding esophageal varices.    Counseled on the long-term effects of hyperglycemia and uncontrolled diabetes including risk for heart attack, stroke, kidney failure, diabetic foot ulcers, limb loss, blindness, and death.  Reviewed worsening kidney function and reduced EGFR on most recent BMP.    Reviewed risks as well as signs and symptoms of hypoglycemia.  Rule of 15's provided in AVS for appropriate treatment.  Call the office for blood glucose levels less than 70 mg/dL persistent patterns over 250 mg/dL.  Continue to check blood glucose levels at least once daily, at different times of the day.    Continue to improve diet and lifestyle including attention to the amount and type of carbohydrates consumed as well as increased physical activity as tolerated.    Follow-up in 3 months.  Labs prior to next visit.  Lab Results   Component Value Date    HGBA1C 9.8 (H) 02/19/2025     Orders:  •  insulin degludec (Tresiba FlexTouch) 100 units/mL injection pen; Inject 36 Units under the skin daily at bedtime  •  repaglinide (PRANDIN) 0.5 mg tablet; Take 1 tablet (0.5 mg total) by mouth 3 (three) times a day before meals  •  Basic metabolic panel; Future  •  Hemoglobin A1C; Future

## 2025-02-26 NOTE — PATIENT INSTRUCTIONS
Stay well hydrated  Increase Tresiba to 36 units nightly  Continue Jardiance 25 mg daily  Start Repaglinide 0.5 mg before meals  DO NOT TAKE IF YOU ARE NOT EATING A MEAL  Call the office for blood glucose levels less than 70 mg/dL or persistent patterns over 250 mg/dL.      Low Blood Sugar  Steps to treat low blood sugar.    1. Test blood sugar if you have symptoms of low blood sugar:   Low Blood Sugar Symptoms:  o Sweaty  o Dizzy  o Rapid heartbeat  o Shaky  o Bad mood  o Hungry    2. Treat blood sugar less than 70 with 15 grams of fast-acting carbohydrate:   Examples of 15 grams Fast-Acting Carbohydrate:  o 4 oz juice/ 4 oz regular soda  o 1 tablespoon honey  o 1 pack of fun size skittles (about 15 individual skittles)  o 12 gummy bears  o 4 starburst   o 3-4 hard candies (chew)  o 3-4 glucose tablets (chew)            3.   Wait 15 minutes and test your blood sugar again         4.   If blood sugar is less than 100, repeat steps 2-3.    5.   When your blood sugar is 100 or more, eat a snack if it will be longer than one hour until your next meal. The snack should be 15 grams of carbohydrate and a protein:   Examples of snacks:  o ½ sandwich  o 6 crackers with cheese or with peanut butter  o Piece of fruit with cheese or peanut butter

## 2025-02-28 ENCOUNTER — TELEPHONE (OUTPATIENT)
Age: 75
End: 2025-02-28

## 2025-02-28 DIAGNOSIS — K62.5 BRIGHT RED BLOOD PER RECTUM: ICD-10-CM

## 2025-02-28 DIAGNOSIS — D64.9 ANEMIA, UNSPECIFIED TYPE: Primary | ICD-10-CM

## 2025-02-28 NOTE — TELEPHONE ENCOUNTER
Patient calling in because she wanted to make Michelle aware that when she woke up this morning she noticed bright red blood about the size of a dime on her underwear that she states she believes she passed from her rectum. She is otherwise asymptomatic and not bleeding anywhere else. She has not noted any more blood since this spot. She feels fine at the time and I advised her to continue to monitor symptoms and call back with any additional/new bleeding. She has also made a call out to her GI Dr. Goncalves to make him aware but wanted to make sure Michelle was aware as well due to her low platelets.    She asked about testing for bleeding in the rectum and I advised that colonoscopies can help look for this type of bleeding but are not always indicated and that would need to be ordered by the doctor if they felt it appropriate. Last colonoscopy was in 5/2023 per chart review. She verbalized understanding.    She states she will call back with any new or worsening symptoms.

## 2025-02-28 NOTE — TELEPHONE ENCOUNTER
Call to patient with no answer. Left brief message to get STAT CBCD and to call back. When patient calls back please review below message from Michelle SEPULVEDA regarding when to go to the hospital with worsening symptoms and touch base regarding STAT CBCD to check PLT count.

## 2025-02-28 NOTE — TELEPHONE ENCOUNTER
Pt. Wanted to make Dr. Goncalves aware she passed a small amount of blood overnight, size of a dime in her underwear, denies blood in stool or abdominal pain, denies any other symptoms, pt. Advised to keep an eye on symptoms and if she develops new or worsening rectal bleeding to call back, pt. Advised to go to ER if she has severe bleeding or becomes symptomatic, pt. Also wanted to make Dr. Goncalves aware her platelets are down as well, she is getting re-peat blood work next week

## 2025-03-14 ENCOUNTER — TELEPHONE (OUTPATIENT)
Dept: HEMATOLOGY ONCOLOGY | Facility: CLINIC | Age: 75
End: 2025-03-14

## 2025-03-19 ENCOUNTER — APPOINTMENT (OUTPATIENT)
Age: 75
End: 2025-03-19
Payer: COMMERCIAL

## 2025-03-19 ENCOUNTER — TELEPHONE (OUTPATIENT)
Age: 75
End: 2025-03-19

## 2025-03-19 DIAGNOSIS — N18.30 STAGE 3 CHRONIC KIDNEY DISEASE, UNSPECIFIED WHETHER STAGE 3A OR 3B CKD (HCC): ICD-10-CM

## 2025-03-19 DIAGNOSIS — N18.30 STAGE 3 CHRONIC KIDNEY DISEASE, UNSPECIFIED WHETHER STAGE 3A OR 3B CKD (HCC): Primary | ICD-10-CM

## 2025-03-19 DIAGNOSIS — D50.0 IRON DEFICIENCY ANEMIA DUE TO CHRONIC BLOOD LOSS: ICD-10-CM

## 2025-03-19 DIAGNOSIS — K62.5 BRIGHT RED BLOOD PER RECTUM: ICD-10-CM

## 2025-03-19 DIAGNOSIS — D64.9 ANEMIA, UNSPECIFIED TYPE: ICD-10-CM

## 2025-03-19 LAB
ANION GAP SERPL CALCULATED.3IONS-SCNC: 8 MMOL/L (ref 4–13)
BASOPHILS # BLD AUTO: 0.05 THOUSANDS/ÂΜL (ref 0–0.1)
BASOPHILS NFR BLD AUTO: 1 % (ref 0–1)
BUN SERPL-MCNC: 20 MG/DL (ref 5–25)
CALCIUM SERPL-MCNC: 8.8 MG/DL (ref 8.4–10.2)
CHLORIDE SERPL-SCNC: 105 MMOL/L (ref 96–108)
CO2 SERPL-SCNC: 27 MMOL/L (ref 21–32)
CREAT SERPL-MCNC: 1.25 MG/DL (ref 0.6–1.3)
EOSINOPHIL # BLD AUTO: 0.71 THOUSAND/ÂΜL (ref 0–0.61)
EOSINOPHIL NFR BLD AUTO: 13 % (ref 0–6)
ERYTHROCYTE [DISTWIDTH] IN BLOOD BY AUTOMATED COUNT: 13.7 % (ref 11.6–15.1)
GFR SERPL CREATININE-BSD FRML MDRD: 43 ML/MIN/1.73SQ M
GLUCOSE P FAST SERPL-MCNC: 103 MG/DL (ref 65–99)
HCT VFR BLD AUTO: 35.5 % (ref 34.8–46.1)
HGB BLD-MCNC: 12.4 G/DL (ref 11.5–15.4)
IMM GRANULOCYTES # BLD AUTO: 0.01 THOUSAND/UL (ref 0–0.2)
IMM GRANULOCYTES NFR BLD AUTO: 0 % (ref 0–2)
LYMPHOCYTES # BLD AUTO: 0.77 THOUSANDS/ÂΜL (ref 0.6–4.47)
LYMPHOCYTES NFR BLD AUTO: 14 % (ref 14–44)
MCH RBC QN AUTO: 31.1 PG (ref 26.8–34.3)
MCHC RBC AUTO-ENTMCNC: 34.9 G/DL (ref 31.4–37.4)
MCV RBC AUTO: 89 FL (ref 82–98)
MONOCYTES # BLD AUTO: 0.4 THOUSAND/ÂΜL (ref 0.17–1.22)
MONOCYTES NFR BLD AUTO: 7 % (ref 4–12)
NEUTROPHILS # BLD AUTO: 3.76 THOUSANDS/ÂΜL (ref 1.85–7.62)
NEUTS SEG NFR BLD AUTO: 65 % (ref 43–75)
NRBC BLD AUTO-RTO: 0 /100 WBCS
PLATELET # BLD AUTO: 110 THOUSANDS/UL (ref 149–390)
PMV BLD AUTO: 9.9 FL (ref 8.9–12.7)
POTASSIUM SERPL-SCNC: 4.1 MMOL/L (ref 3.5–5.3)
RBC # BLD AUTO: 3.99 MILLION/UL (ref 3.81–5.12)
SODIUM SERPL-SCNC: 140 MMOL/L (ref 135–147)
WBC # BLD AUTO: 5.7 THOUSAND/UL (ref 4.31–10.16)

## 2025-03-19 PROCEDURE — 36415 COLL VENOUS BLD VENIPUNCTURE: CPT

## 2025-03-19 PROCEDURE — 80048 BASIC METABOLIC PNL TOTAL CA: CPT

## 2025-03-19 PROCEDURE — 85025 COMPLETE CBC W/AUTO DIFF WBC: CPT

## 2025-03-21 ENCOUNTER — OFFICE VISIT (OUTPATIENT)
Dept: HEMATOLOGY ONCOLOGY | Facility: CLINIC | Age: 75
End: 2025-03-21
Payer: COMMERCIAL

## 2025-03-21 VITALS
BODY MASS INDEX: 27.82 KG/M2 | HEIGHT: 59 IN | HEART RATE: 72 BPM | WEIGHT: 138 LBS | OXYGEN SATURATION: 97 % | TEMPERATURE: 97.7 F | RESPIRATION RATE: 18 BRPM | SYSTOLIC BLOOD PRESSURE: 118 MMHG | DIASTOLIC BLOOD PRESSURE: 74 MMHG

## 2025-03-21 DIAGNOSIS — D69.6 THROMBOCYTOPENIA (HCC): Primary | ICD-10-CM

## 2025-03-21 DIAGNOSIS — D50.0 IRON DEFICIENCY ANEMIA DUE TO CHRONIC BLOOD LOSS: ICD-10-CM

## 2025-03-21 PROCEDURE — 99214 OFFICE O/P EST MOD 30 MIN: CPT | Performed by: PHYSICIAN ASSISTANT

## 2025-03-21 NOTE — PROGRESS NOTES
Name: Iman Walden      : 1950      MRN: [id number]  Encounter Provider: Michelle Velazquez PA-C  Encounter Date: 3/21/2025   Encounter department: [facility] [place]  :  74-year-old female with history of CKD, cirrhosis who is seen in follow up today for iron deficiency anemia.  She has history of iron deficiency anemia secondary to esophageal variceal bleeding in  and then had recurrent anemia in [id number] secondary tomultiple erythematous/hemorrhagic and pedunculated polyps. She has been on oral iron with resolution of her anemia. Most recent labs -> wbc 5.7, Hgb 12.4, PLT 110k. She has a new isolated thrombocytopenia without any bruising or bleeding.   Assessment & Plan  Thrombocytopenia (HCC)  PLT may have been previously increased/normal in response to WENDY which is now resolved. This may be her PLT baseline    May have thrombocytopenia 2/2 hepatosplenomegaly in setting of cirrhosis, ITP also in differential    Consider repeat abdominal imaging to evaluate for splenomegaly as sequala from liver disease   Pt advised to call our office with any s/s of worsening thrombocytopenia ie abnormal bruising, any bleeding (blood in stool, blood in urine, black stools, vaginal bleeding, nose bleeds, gingival bleeding) or petechial like rash which would prompt repeat labs sooner   Orders:    US abdomen complete; Future    CBC and differential; Future    Folate; Future    Immature Platelet Fraction; Future    Vitamin B12; Future    CBC and differential; Future    Iron deficiency anemia due to chronic blood loss  Hgb 12g/dL, resolved   No recent iron panel available   Orders:    Iron Panel (Includes Ferritin, Iron Sat%, Iron, and TIBC); Future        Return in about 3 months (around 2025) for Office Visit.    History of Present Illness   Chief Complaint   Patient presents with    Follow-up     74-year-old female with past medical history of  alcohol use, hepatitis C, cirrhosis, CKD stage III, type 2 diabetes complicated by peripheral neuropathy and hypertension who has been referred by her PCP for evaluation and management of anemia.     Patient reports she has been anemic for many years. There are limited data points in EMR available for review.  Does appear that the patient has had anemia since at least 2021.  Around this time she was hospitalized for fall with subsequent compression fracture.  She was found to have cirrhosis during admission and had a upper GI bleed due to bleeding varices.  She does report history of alcohol use and hepatitis C.  She received her only blood transfusion of lites during this hospitalization.  Follow-up labs in 2022 showed normal hemoglobin in the 11-12 range   Until April 2023 where she developed recurrent anemia.  Hemoglobin at this time was around 9 g/dL.  Her most recent labs from February 2024 show worsening anemia with hemoglobin of 7.3 hemoglobin of 7.3, labs consistent with a microcytic anemia.  WBC and platelet count are normal however differential does show eosinophilia with absolute significant count of 1.02. There is no recent iron panel available for review.  Last ferritin level was low in 2020.  Recent B12 level 761.     She denies history of malabsorption condition, hematochezia, melena, hematuria or vaginal bleeding.  She is on oral iron every other day and has been on this regimen for several months.  No history of IV iron.  She is on PPI chronically.     Rare alcohol use. No tobacco use. She is on medical marijuana vape. No other drug use.       No history of thyroid cancer. No pernasal history of cancer   Mothers aunt had breast cancer, Maternal grandmother skin cancer, maternal grandfather unknown CA type      Last EGD 09/2022: Nonbleeding small esophageal varices. Multiple inflamed gastric polyps possibly the cause of hematemesis. Duodenal polyp possibly Brunner's gland.  Pathology showed  hyperplastic polyp.  Last colonoscopy 05/2023: polyps      02/2024: WBC 7.2, hemoglobin 7.3, MCV 72, RDW 17, platelets 209,000.  Creatinine 1.39, EGFR 38.     05/2024: WBC 6.57, hemoglobin 9.0 g/dL, MCV 77, RDW 20.6, platelets 205,000. eGFR 35. .  LD low.  Haptoglobin normal.  reticulocyte count 2%.  FIT  positive +. started oral iron therapy     06/2024: hemoglobin 9.7g/dL, MCV 82, RDW 19.0 platelets 153,000.  Iron saturation 8%, UIBC normal, ferritin 22.  Increased from prior ferritin level of 9. eGFR45.  Patient had maroon stools approximately 2 weeks ago.  Now resolved.  Denies abdominal pain, shortness of breath, fatigue, dizziness or lightheadedness.  She has not observed any bright red blood per rectum.  Has EGD planned for next month.  On oral iron 3 times a week.      08/2024 EGD showed 10 or more pedunculated polyps in 10 to 19 mm in the stomach; bleeding occurred after intervention.  There were multiple erythematous/hemorrhagic polyps scattered throughout the antrum, body and fundus of the stomach.  Some of these polyps were sessile, others were pedunculated or pseudo pedunculated.  There were additional polyps seen in the prepyloric, body of the stomach,and duodenum. Biopsy showed benign polyps. No celiac or malignancy.      10/2024 EGD showed multiple benign-appearing and semi-pedunculated polyps measuring 5-9 mm in the cardia, fundus of the stomach and body of the stomac. Gastric polyp bx c/w Hyperplastic polyps with focal intestinal metaplasia and low grade dysplasia. Plan for repeat EGD in 01/2025.     Pertinent Medical History     03/21/25: she is feeling anxious regarding low platelets. No recent changes in medication. Denies hematochezia, melena, hematuria, abnormal bruising or rashes     Review of Systems   All other systems reviewed and are negative.          Objective   /74 (BP Location: Right arm, Patient Position: Sitting, Cuff Size: Adult)   Pulse 72   Temp 97.7 °F (36.5 °C)  "(Temporal)   Resp 18   Ht 4' 11\" (1.499 m)   Wt 62.6 kg (138 lb)   SpO2 97%   BMI 27.87 kg/m²     Physical Exam  Vitals reviewed.   HENT:      Head: Normocephalic.   Cardiovascular:      Rate and Rhythm: Normal rate and regular rhythm.   Pulmonary:      Effort: Pulmonary effort is normal.      Breath sounds: Normal breath sounds.   Abdominal:      Palpations: Abdomen is soft.      Tenderness: There is no abdominal tenderness.   Musculoskeletal:      Cervical back: Neck supple.   Skin:     Findings: No rash.   Neurological:      Mental Status: She is alert.         Labs: I have reviewed the following labs:  Results for orders placed or performed in visit on 03/19/25   Basic metabolic panel   Result Value Ref Range    Sodium 140 135 - 147 mmol/L    Potassium 4.1 3.5 - 5.3 mmol/L    Chloride 105 96 - 108 mmol/L    CO2 27 21 - 32 mmol/L    ANION GAP 8 4 - 13 mmol/L    BUN 20 5 - 25 mg/dL    Creatinine 1.25 0.60 - 1.30 mg/dL    Glucose, Fasting 103 (H) 65 - 99 mg/dL    Calcium 8.8 8.4 - 10.2 mg/dL    eGFR 43 ml/min/1.73sq m   CBC and differential   Result Value Ref Range    WBC 5.70 4.31 - 10.16 Thousand/uL    RBC 3.99 3.81 - 5.12 Million/uL    Hemoglobin 12.4 11.5 - 15.4 g/dL    Hematocrit 35.5 34.8 - 46.1 %    MCV 89 82 - 98 fL    MCH 31.1 26.8 - 34.3 pg    MCHC 34.9 31.4 - 37.4 g/dL    RDW 13.7 11.6 - 15.1 %    MPV 9.9 8.9 - 12.7 fL    Platelets 110 (L) 149 - 390 Thousands/uL    nRBC 0 /100 WBCs    Segmented % 65 43 - 75 %    Immature Grans % 0 0 - 2 %    Lymphocytes % 14 14 - 44 %    Monocytes % 7 4 - 12 %    Eosinophils Relative 13 (H) 0 - 6 %    Basophils Relative 1 0 - 1 %    Absolute Neutrophils 3.76 1.85 - 7.62 Thousands/µL    Absolute Immature Grans 0.01 0.00 - 0.20 Thousand/uL    Absolute Lymphocytes 0.77 0.60 - 4.47 Thousands/µL    Absolute Monocytes 0.40 0.17 - 1.22 Thousand/µL    Eosinophils Absolute 0.71 (H) 0.00 - 0.61 Thousand/µL    Basophils Absolute 0.05 0.00 - 0.10 Thousands/µL             "

## 2025-03-21 NOTE — PATIENT INSTRUCTIONS
Please call our office with any abnormal bruising, any bleeding (blood in stool, blood in urine, black stools, vaginal bleeding, nose bleeds, gingival bleeding) or petechial like rash

## 2025-03-24 ENCOUNTER — TELEPHONE (OUTPATIENT)
Age: 75
End: 2025-03-24

## 2025-03-24 NOTE — TELEPHONE ENCOUNTER
Patient called in regards to her Tresiba FlexTouch  prescription. She was charged by insurance twice for this medication. She realized that it was sent in by both Dr Lorenzo and her Endocrinology doctor. She wanted to make sure that it was canceled from Dr Lorenzo office as her Endocrine doctor will be handling this medication from now on.  Advised patient active order on her chart is from her endocrine provider. She has no further questions at this time.

## 2025-03-26 ENCOUNTER — HOSPITAL ENCOUNTER (OUTPATIENT)
Dept: ULTRASOUND IMAGING | Facility: CLINIC | Age: 75
Discharge: HOME | End: 2025-03-26
Payer: COMMERCIAL

## 2025-03-26 DIAGNOSIS — D69.6 THROMBOCYTOPENIA (HCC): ICD-10-CM

## 2025-03-26 PROCEDURE — 76700 US EXAM ABDOM COMPLETE: CPT

## 2025-03-31 ENCOUNTER — RESULTS FOLLOW-UP (OUTPATIENT)
Dept: HEMATOLOGY ONCOLOGY | Facility: CLINIC | Age: 75
End: 2025-03-31

## 2025-03-31 DIAGNOSIS — R16.1 SPLEEN ENLARGED: Primary | ICD-10-CM

## 2025-03-31 NOTE — TELEPHONE ENCOUNTER
Called and spoke with patient and made her aware results that Michelle relayed and instructions to follow up in 2 weeks with repeat labs to get done.   Pt verbalized understanding.

## 2025-03-31 NOTE — TELEPHONE ENCOUNTER
----- Message from Michelle Velazquez PA-C sent at 3/31/2025  3:14 PM EDT -----  Her ultrasound showed enlarged spleen.  This is likely the cause of her low platelet count.  Please remind her to go for blood work to check the platelets in approximately 2 weeks.  Please ensure CBC is there

## 2025-04-04 ENCOUNTER — VBI (OUTPATIENT)
Dept: ADMINISTRATIVE | Facility: OTHER | Age: 75
End: 2025-04-04

## 2025-04-04 NOTE — TELEPHONE ENCOUNTER
04/04/25 10:51 AM     Chart reviewed for Mammogram was/were not submitted to the patient's insurance.     Pennie Montoya MA   [facility]

## 2025-04-09 ENCOUNTER — OFFICE VISIT (OUTPATIENT)
Dept: NEPHROLOGY | Facility: CLINIC | Age: 75
End: 2025-04-09

## 2025-04-09 VITALS
HEIGHT: 58 IN | BODY MASS INDEX: 28.34 KG/M2 | WEIGHT: 135 LBS | RESPIRATION RATE: 16 BRPM | OXYGEN SATURATION: 98 % | HEART RATE: 80 BPM | TEMPERATURE: 97.6 F

## 2025-04-09 DIAGNOSIS — E11.9 DIABETES MELLITUS TYPE 2 IN NONOBESE (HCC): ICD-10-CM

## 2025-04-09 DIAGNOSIS — N25.81 SECONDARY HYPERPARATHYROIDISM OF RENAL ORIGIN (HCC): ICD-10-CM

## 2025-04-09 DIAGNOSIS — I15.1 HYPERTENSION SECONDARY TO OTHER RENAL DISORDERS: ICD-10-CM

## 2025-04-09 DIAGNOSIS — K70.30 ALCOHOLIC CIRRHOSIS OF LIVER WITHOUT ASCITES (HCC): ICD-10-CM

## 2025-04-09 DIAGNOSIS — N18.30 STAGE 3 CHRONIC KIDNEY DISEASE, UNSPECIFIED WHETHER STAGE 3A OR 3B CKD (HCC): Primary | ICD-10-CM

## 2025-04-09 NOTE — PROGRESS NOTES
Name: Iman Walden      : 1950      MRN: [id number]  Encounter Provider: Carlos Vasques MD  Encounter Date: 2025   Encounter department: [facility]  :  Assessment & Plan  Stage 3 chronic kidney disease, unspecified whether stage 3a or 3b CKD (HCC)  Lab Results   Component Value Date    EGFR 43 2025    EGFR 26 2025    EGFR 36 10/23/2024    CREATININE 1.25 2025    CREATININE 1.85 (H) 2025    CREATININE 1.42 (H) 10/23/2024     Patient renal function is quite stable.  Will advise hydration and avoiding nephrotoxic medication       Alcoholic cirrhosis of liver without ascites (HCC)  Stable for now.  Does have splenomegaly.  Did not require any paracentesis recently       Secondary hyperparathyroidism of renal origin (HCC)  PTH and phosphorus along with vitamin D are within acceptable range and will continue to monitor       Diabetes mellitus type 2 in nonobese (HCC)    Lab Results   Component Value Date    HGBA1C 9.8 (H) 2025     Not well-controlled       Hypertension secondary to other renal disorders  Running low       Everything discussed with the patient.  She will see Salena back in 6 months.  Will get blood and urine test before that visit    History of Present Illness   HPI  Iman Walden is a 74 y.o. female who presents CKD follow-up    Usually see Salena but came in today to see me as a part of the protocol    Overall doing well no acute complaint    No chest pain no palpitation    No nausea no vomiting    Denies any urinary complaint      Review of Systems   Constitutional:  Negative for fatigue.   HENT:  Negative for congestion.    Eyes:  Negative for photophobia and pain.   Respiratory:  Negative for chest tightness and shortness of breath.    Cardiovascular:  Negative for chest pain and palpitations.   Gastrointestinal:  Negative for abdominal distention, abdominal pain and blood in  "stool.   Endocrine: Negative for polydipsia.   Genitourinary:  Negative for difficulty urinating, dysuria, flank pain, hematuria and urgency.   Musculoskeletal:  Negative for arthralgias and back pain.   Skin:  Negative for rash.   Neurological:  Negative for dizziness, light-headedness and headaches.   Hematological:  Does not bruise/bleed easily.   Psychiatric/Behavioral:  Negative for behavioral problems. The patient is not nervous/anxious.           Objective   Pulse 80   Temp 97.6 °F (36.4 °C) (Temporal)   Resp 16   Ht 4' 10\" (1.473 m)   Wt 61.2 kg (135 lb)   SpO2 98%   BMI 28.22 kg/m²      Physical Exam  Constitutional:       General: She is not in acute distress.     Appearance: She is well-developed.   HENT:      Head: Normocephalic.      Mouth/Throat:      Mouth: Mucous membranes are moist.   Eyes:      General: No scleral icterus.     Conjunctiva/sclera: Conjunctivae normal.   Neck:      Vascular: No JVD.   Cardiovascular:      Rate and Rhythm: Normal rate.      Heart sounds: Normal heart sounds.   Pulmonary:      Effort: Pulmonary effort is normal.      Breath sounds: No wheezing.   Abdominal:      Palpations: Abdomen is soft.      Tenderness: There is no abdominal tenderness.   Musculoskeletal:         General: Normal range of motion.      Cervical back: Neck supple.   Skin:     General: Skin is warm.      Findings: No rash.   Neurological:      Mental Status: She is alert and oriented to person, place, and time.   Psychiatric:         Behavior: Behavior normal.           "

## 2025-04-09 NOTE — ASSESSMENT & PLAN NOTE
Lab Results   Component Value Date    EGFR 43 03/19/2025    EGFR 26 02/19/2025    EGFR 36 10/23/2024    CREATININE 1.25 03/19/2025    CREATININE 1.85 (H) 02/19/2025    CREATININE 1.42 (H) 10/23/2024     Patient renal function is quite stable.  Will advise hydration and avoiding nephrotoxic medication

## 2025-04-09 NOTE — ASSESSMENT & PLAN NOTE
PTH and phosphorus along with vitamin D are within acceptable range and will continue to monitor

## 2025-04-10 ENCOUNTER — RA CDI HCC (OUTPATIENT)
Dept: OTHER | Facility: HOSPITAL | Age: 75
End: 2025-04-10

## 2025-04-10 NOTE — PROGRESS NOTES
[facility] coding opportunities          Chart Reviewed number of suggestions sent to Provider: 2    E11.319 see 11/1/23 eye exam    E11.65      Patients Insurance     Medicare Insurance: [employer]

## 2025-04-16 ENCOUNTER — OFFICE VISIT (OUTPATIENT)
Dept: FAMILY MEDICINE CLINIC | Facility: CLINIC | Age: 75
End: 2025-04-16
Payer: COMMERCIAL

## 2025-04-16 ENCOUNTER — APPOINTMENT (OUTPATIENT)
Age: 75
End: 2025-04-16
Attending: PHYSICIAN ASSISTANT
Payer: COMMERCIAL

## 2025-04-16 VITALS
HEART RATE: 70 BPM | BODY MASS INDEX: 29.14 KG/M2 | WEIGHT: 138.8 LBS | OXYGEN SATURATION: 97 % | DIASTOLIC BLOOD PRESSURE: 68 MMHG | SYSTOLIC BLOOD PRESSURE: 120 MMHG | HEIGHT: 58 IN

## 2025-04-16 DIAGNOSIS — F11.20 CONTINUOUS OPIOID DEPENDENCE (HCC): ICD-10-CM

## 2025-04-16 DIAGNOSIS — D69.6 THROMBOCYTOPENIA (HCC): Primary | ICD-10-CM

## 2025-04-16 DIAGNOSIS — R16.1 SPLEEN ENLARGED: ICD-10-CM

## 2025-04-16 DIAGNOSIS — E78.2 MIXED HYPERLIPIDEMIA: ICD-10-CM

## 2025-04-16 DIAGNOSIS — E11.9 DIABETES MELLITUS TYPE 2 IN NONOBESE (HCC): ICD-10-CM

## 2025-04-16 DIAGNOSIS — K70.30 ALCOHOLIC CIRRHOSIS OF LIVER WITHOUT ASCITES (HCC): ICD-10-CM

## 2025-04-16 DIAGNOSIS — N18.30 STAGE 3 CHRONIC KIDNEY DISEASE, UNSPECIFIED WHETHER STAGE 3A OR 3B CKD (HCC): ICD-10-CM

## 2025-04-16 DIAGNOSIS — D69.6 THROMBOCYTOPENIA (HCC): ICD-10-CM

## 2025-04-16 DIAGNOSIS — D50.0 IRON DEFICIENCY ANEMIA DUE TO CHRONIC BLOOD LOSS: ICD-10-CM

## 2025-04-16 DIAGNOSIS — I85.11 SECONDARY ESOPHAGEAL VARICES WITH BLEEDING (HCC): ICD-10-CM

## 2025-04-16 DIAGNOSIS — I15.1 HYPERTENSION SECONDARY TO OTHER RENAL DISORDERS: ICD-10-CM

## 2025-04-16 LAB
BASOPHILS # BLD AUTO: 0.04 THOUSANDS/ÂΜL (ref 0–0.1)
BASOPHILS NFR BLD AUTO: 1 % (ref 0–1)
EOSINOPHIL # BLD AUTO: 0.66 THOUSAND/ÂΜL (ref 0–0.61)
EOSINOPHIL NFR BLD AUTO: 12 % (ref 0–6)
ERYTHROCYTE [DISTWIDTH] IN BLOOD BY AUTOMATED COUNT: 13.1 % (ref 11.6–15.1)
FERRITIN SERPL-MCNC: 76 NG/ML (ref 30–307)
FOLATE SERPL-MCNC: >22.3 NG/ML
HCT VFR BLD AUTO: 34.8 % (ref 34.8–46.1)
HGB BLD-MCNC: 12.1 G/DL (ref 11.5–15.4)
IMM GRANULOCYTES # BLD AUTO: 0.01 THOUSAND/UL (ref 0–0.2)
IMM GRANULOCYTES NFR BLD AUTO: 0 % (ref 0–2)
IRON SATN MFR SERPL: 39 % (ref 15–50)
IRON SERPL-MCNC: 101 UG/DL (ref 50–212)
LYMPHOCYTES # BLD AUTO: 0.71 THOUSANDS/ÂΜL (ref 0.6–4.47)
LYMPHOCYTES NFR BLD AUTO: 13 % (ref 14–44)
MCH RBC QN AUTO: 30.9 PG (ref 26.8–34.3)
MCHC RBC AUTO-ENTMCNC: 34.8 G/DL (ref 31.4–37.4)
MCV RBC AUTO: 89 FL (ref 82–98)
MONOCYTES # BLD AUTO: 0.36 THOUSAND/ÂΜL (ref 0.17–1.22)
MONOCYTES NFR BLD AUTO: 7 % (ref 4–12)
NEUTROPHILS # BLD AUTO: 3.75 THOUSANDS/ÂΜL (ref 1.85–7.62)
NEUTS SEG NFR BLD AUTO: 67 % (ref 43–75)
NRBC BLD AUTO-RTO: 0 /100 WBCS
PLATELET # BLD AUTO: 123 THOUSANDS/UL (ref 149–390)
PLATELETS.RETICULATED NFR BLD AUTO: 1.6 %
PMV BLD AUTO: 9.9 FL (ref 8.9–12.7)
RBC # BLD AUTO: 3.91 MILLION/UL (ref 3.81–5.12)
TIBC SERPL-MCNC: 260.4 UG/DL (ref 250–450)
TRANSFERRIN SERPL-MCNC: 186 MG/DL (ref 203–362)
UIBC SERPL-MCNC: 159 UG/DL (ref 155–355)
VIT B12 SERPL-MCNC: 1130 PG/ML (ref 180–914)
WBC # BLD AUTO: 5.53 THOUSAND/UL (ref 4.31–10.16)

## 2025-04-16 PROCEDURE — 82746 ASSAY OF FOLIC ACID SERUM: CPT

## 2025-04-16 PROCEDURE — 82728 ASSAY OF FERRITIN: CPT

## 2025-04-16 PROCEDURE — 83540 ASSAY OF IRON: CPT

## 2025-04-16 PROCEDURE — G2211 COMPLEX E/M VISIT ADD ON: HCPCS | Performed by: STUDENT IN AN ORGANIZED HEALTH CARE EDUCATION/TRAINING PROGRAM

## 2025-04-16 PROCEDURE — 85055 RETICULATED PLATELET ASSAY: CPT

## 2025-04-16 PROCEDURE — 85025 COMPLETE CBC W/AUTO DIFF WBC: CPT

## 2025-04-16 PROCEDURE — 99214 OFFICE O/P EST MOD 30 MIN: CPT | Performed by: STUDENT IN AN ORGANIZED HEALTH CARE EDUCATION/TRAINING PROGRAM

## 2025-04-16 PROCEDURE — 82607 VITAMIN B-12: CPT

## 2025-04-16 PROCEDURE — 36415 COLL VENOUS BLD VENIPUNCTURE: CPT

## 2025-04-16 PROCEDURE — 83550 IRON BINDING TEST: CPT

## 2025-04-16 NOTE — PROGRESS NOTES
Name: Iman Walden      : 1950      MRN: [id number]  Encounter Provider: Kelvin Lorenzo MD  Encounter Date: 2025   Encounter department: [facility] [address]  :  Assessment & Plan  Diabetes mellitus type 2 in nonobese (HCC)    Lab Results   Component Value Date    HGBA1C 9.8 (H) 2025     Dr Franco at [facility]. Glucose reraindgs 140-190 (that was the high). Continue with dietary changes, reducing late night snacks. Reviewed endo notes       Thrombocytopenia (HCC)  Enalrged spleen seen on US, reviewed hematology note       Secondary esophageal varices with bleeding (HCC)  Without bleeding, ETOH cessation       Stage 3 chronic kidney disease, unspecified whether stage 3a or 3b CKD (HCC)  Lab Results   Component Value Date    EGFR 43 2025    EGFR 26 2025    EGFR 36 10/23/2024    CREATININE 1.25 2025    CREATININE 1.85 (H) 2025    CREATININE 1.42 (H) 10/23/2024   Stable, hydration. Reviewed nephrology notes         Alcoholic cirrhosis of liver without ascites (HCC)         Hypertension secondary to other renal disorders  Very well controlled       Mixed hyperlipidemia  Monitor labs , 20mg lipitor       Continuous opioid dependence (HCC)                History of Present Illness   HPI    Follow up     Diabetes    Started on repaglinde (takes 3x a day before meals). Tends to snack all day.     At hematology, diagnosed with low platelets and had subsequent blood work today as well as an ultrasound that showed enlarged spleen.  Review of Systems   Constitutional:  Negative for chills, fatigue and fever.   HENT:  Negative for rhinorrhea and sore throat.    Eyes:  Negative for visual disturbance.   Respiratory:  Negative for cough and shortness of breath.    Cardiovascular:  Negative for chest pain and palpitations.   Gastrointestinal:  Negative for abdominal pain, constipation, diarrhea, nausea and vomiting.  "  Genitourinary:  Negative for difficulty urinating, dysuria and frequency.   Musculoskeletal:  Negative for arthralgias and myalgias.   Skin:  Negative for color change and rash.   Neurological:  Negative for weakness and headaches.       Objective   /68   Pulse 70   Ht 4' 10\" (1.473 m)   Wt 63 kg (138 lb 12.8 oz)   SpO2 97%   BMI 29.01 kg/m²      Physical Exam  Constitutional:       General: She is not in acute distress.     Appearance: Normal appearance. She is not ill-appearing.   HENT:      Head: Normocephalic and atraumatic.      Right Ear: Tympanic membrane, ear canal and external ear normal.      Left Ear: Tympanic membrane, ear canal and external ear normal.      Nose: Nose normal.      Mouth/Throat:      Mouth: Mucous membranes are moist.      Pharynx: Oropharynx is clear. No oropharyngeal exudate or posterior oropharyngeal erythema.   Eyes:      General: No scleral icterus.        Right eye: No discharge.         Left eye: No discharge.      Extraocular Movements: Extraocular movements intact.      Conjunctiva/sclera: Conjunctivae normal.      Pupils: Pupils are equal, round, and reactive to light.   Cardiovascular:      Rate and Rhythm: Normal rate and regular rhythm.      Pulses: Normal pulses.      Heart sounds: Normal heart sounds. No murmur heard.  Pulmonary:      Effort: Pulmonary effort is normal. No respiratory distress.      Breath sounds: Normal breath sounds.   Abdominal:      General: Bowel sounds are normal.      Palpations: Abdomen is soft.      Tenderness: There is no abdominal tenderness.   Musculoskeletal:         General: Normal range of motion.      Cervical back: Normal range of motion and neck supple.   Lymphadenopathy:      Cervical: No cervical adenopathy.   Skin:     General: Skin is warm and dry.      Capillary Refill: Capillary refill takes less than 2 seconds.   Neurological:      General: No focal deficit present.      Mental Status: She is alert and oriented to " person, place, and time. Mental status is at baseline.      Cranial Nerves: No cranial nerve deficit.   Psychiatric:         Mood and Affect: Mood normal.

## 2025-04-16 NOTE — ASSESSMENT & PLAN NOTE
Lab Results   Component Value Date    EGFR 43 03/19/2025    EGFR 26 02/19/2025    EGFR 36 10/23/2024    CREATININE 1.25 03/19/2025    CREATININE 1.85 (H) 02/19/2025    CREATININE 1.42 (H) 10/23/2024   Stable, hydration. Reviewed nephrology notes

## 2025-04-16 NOTE — ASSESSMENT & PLAN NOTE
Lab Results   Component Value Date    HGBA1C 9.8 (H) 02/19/2025     Dr Franco at [facility]. Glucose reraindgs 140-190 (that was the high). Continue with dietary changes, reducing late night snacks. Reviewed endo notes

## 2025-04-17 ENCOUNTER — TELEPHONE (OUTPATIENT)
Dept: ADMINISTRATIVE | Facility: OTHER | Age: 75
End: 2025-04-17

## 2025-04-17 NOTE — TELEPHONE ENCOUNTER
Upon review of the In Basket request and the patient's chart, initial outreach has been made via fax to facility. Please see Contacts section for details.     Thank you  Cristiane Torres MA

## 2025-04-17 NOTE — LETTER
Diabetic Eye Exam Form    Date Requested: 25  Patient: Iman Walden  Patient : 1950   Referring Provider: Kelvin Lorenzo MD      DIABETIC Eye Exam Date _______________________________      Type of Exam MUST be documented for Diabetic Eye Exams. Please CHECK ONE.     Retinal Exam       Dilated Retinal Exam       OCT       Optomap-Iris Exam      Fundus Photography       Left Eye - Please check Retinopathy or No Retinopathy        Exam did show retinopathy    Exam did not show retinopathy       Right Eye - Please check Retinopathy or No Retinopathy       Exam did show retinopathy    Exam did not show retinopathy       Comments __________________________________________________________    Practice Providing Exam ______________________________________________    Exam Performed By (print name) _______________________________________      Provider Signature ___________________________________________________      These reports are needed for  compliance.    Please fax this completed form and a copy of the Diabetic Eye Exam report   to the [facility] Based Department as soon as possible via   Fax [phone] attention Cristiane: Phone [phone].   Our office located at [address] [place] [address]     We thank you for your assistance in treating our mutual patient.

## 2025-04-17 NOTE — LETTER
2nd Request      Diabetic Eye Exam Form    Date Requested: 25  Patient: Iman Walden  Patient : 1950   Referring Provider: Kelvin Lorenzo MD      DIABETIC Eye Exam Date _______________________________      Type of Exam MUST be documented for Diabetic Eye Exams. Please CHECK ONE.     Retinal Exam       Dilated Retinal Exam       OCT       Optomap-Iris Exam      Fundus Photography       Left Eye - Please check Retinopathy or No Retinopathy        Exam did show retinopathy    Exam did not show retinopathy       Right Eye - Please check Retinopathy or No Retinopathy       Exam did show retinopathy    Exam did not show retinopathy       Comments __________________________________________________________    Practice Providing Exam ______________________________________________    Exam Performed By (print name) _______________________________________      Provider Signature ___________________________________________________      These reports are needed for  compliance.    Please fax this completed form and a copy of the Diabetic Eye Exam report   to the [facility] Based Department as soon as possible via   Fax [phone] attention Cristiane: Phone [phone].   Our office located at [address] [place] [address]     We thank you for your assistance in treating our mutual patient.

## 2025-04-17 NOTE — TELEPHONE ENCOUNTER
----- Message from Connie GRIMALDO sent at 4/16/2025  2:10 PM EDT -----  Regarding: [email or web address]  04/16/25 2:10 PM    Hello, our patient Iman Walden has had Diabetic Eye Exam completed/performed. Please assist in updating the patient chart by making an External outreach to [facility] eye asc facility located in [place] [address]. The date of service is 6 month ago.    Thank you,  RYLAN Raymundo [facility] [address]

## 2025-04-21 ENCOUNTER — TELEPHONE (OUTPATIENT)
Age: 75
End: 2025-04-21

## 2025-04-21 NOTE — TELEPHONE ENCOUNTER
Patient called, asking to discuss her lab results from 4/16 due to her platelet count. Did attempt CTS and no response. She would like a call back to discuss

## 2025-04-22 DIAGNOSIS — D69.6 THROMBOCYTOPENIA (HCC): Primary | ICD-10-CM

## 2025-04-22 NOTE — TELEPHONE ENCOUNTER
As a follow-up, a second attempt has been made for outreach via fax to facility. Please see Contacts section for details.    Thank you  Cristiane Torres MA

## 2025-04-22 NOTE — TELEPHONE ENCOUNTER
Called and spoke with Iman Sherman and made her aware that Michelle was ok with waiting a month prior to repeating labs. Pt verbalizes understanding and discussed signs and symptoms of low platelets and she verbalized understanding. She will have her labs done in a month.

## 2025-04-25 ENCOUNTER — TELEPHONE (OUTPATIENT)
Dept: NEPHROLOGY | Facility: CLINIC | Age: 75
End: 2025-04-25

## 2025-04-25 NOTE — TELEPHONE ENCOUNTER
Called left message for patient from October 2025 recall list. advised to call office to schedule follow-up with Lyla Bradley NP on or after 10/01/25.

## 2025-04-25 NOTE — TELEPHONE ENCOUNTER
Upon review of the In Basket request we were able to locate, review, and update the patient chart as requested for Diabetic Eye Exam.    Any additional questions or concerns should be emailed to the Practice Liaisons via the appropriate education email address, please do not reply via In Basket.    Thank you  Cristiane Torres MA   [facility]

## 2025-05-01 ENCOUNTER — TELEPHONE (OUTPATIENT)
Dept: NEPHROLOGY | Facility: CLINIC | Age: 75
End: 2025-05-01

## 2025-05-01 NOTE — TELEPHONE ENCOUNTER
I called and spoke to the patient and schedule her 6 month nephrology follow up appointment with KRYSTLE Zhao per Dr. RODDY Vasques. The patient understood and was okay with the day and time of her next appointment. I also mailed out an appointment card to the patient.

## 2025-05-02 DIAGNOSIS — K70.30 ALCOHOLIC CIRRHOSIS OF LIVER WITHOUT ASCITES (HCC): ICD-10-CM

## 2025-05-02 DIAGNOSIS — E11.9 DIABETES MELLITUS TYPE 2 IN NONOBESE (HCC): ICD-10-CM

## 2025-05-02 DIAGNOSIS — D64.9 ANEMIA, UNSPECIFIED TYPE: ICD-10-CM

## 2025-05-02 DIAGNOSIS — F10.11 HISTORY OF ALCOHOL ABUSE: ICD-10-CM

## 2025-05-02 RX ORDER — ATORVASTATIN CALCIUM 20 MG/1
20 TABLET, FILM COATED ORAL DAILY
Qty: 30 TABLET | Refills: 0 | Status: SHIPPED | OUTPATIENT
Start: 2025-05-02

## 2025-05-02 RX ORDER — FERROUS SULFATE 325(65) MG
1 TABLET ORAL EVERY OTHER DAY
Qty: 45 TABLET | Refills: 1 | Status: SHIPPED | OUTPATIENT
Start: 2025-05-02

## 2025-05-02 RX ORDER — PANTOPRAZOLE SODIUM 40 MG/1
40 TABLET, DELAYED RELEASE ORAL DAILY
Qty: 90 TABLET | Refills: 1 | Status: SHIPPED | OUTPATIENT
Start: 2025-05-02

## 2025-05-07 ENCOUNTER — OFFICE VISIT (OUTPATIENT)
Dept: FAMILY MEDICINE CLINIC | Facility: CLINIC | Age: 75
End: 2025-05-07
Payer: COMMERCIAL

## 2025-05-07 VITALS
SYSTOLIC BLOOD PRESSURE: 112 MMHG | WEIGHT: 138 LBS | HEART RATE: 82 BPM | OXYGEN SATURATION: 93 % | HEIGHT: 58 IN | DIASTOLIC BLOOD PRESSURE: 62 MMHG | TEMPERATURE: 98.3 F | BODY MASS INDEX: 28.97 KG/M2

## 2025-05-07 DIAGNOSIS — H61.23 BILATERAL IMPACTED CERUMEN: Primary | ICD-10-CM

## 2025-05-07 PROCEDURE — 99214 OFFICE O/P EST MOD 30 MIN: CPT

## 2025-05-07 PROCEDURE — 69210 REMOVE IMPACTED EAR WAX UNI: CPT

## 2025-05-07 NOTE — PROGRESS NOTES
"Name: Iman Walden      : 1950      MRN: [id number]  Encounter Provider: Rhonda Greenwood PA-C  Encounter Date: 2025   Encounter department: [facility] [address]  :  Assessment & Plan  Bilateral impacted cerumen  -pt noticed decreased hearing x 1 week. Notes left is worse than right  -has been using peroxide at home without drainage noted  -Denies fever, chills, ear pain  -Cerumen removal done in office today - proc doc in note  -Recommend Debrox drops to soften wax in the future before ear lavages               History of Present Illness     HPI    Iman pedersen presents to the office for evaluation of decreased hearing in left ear x 1 week. She notes usually the ear wax drains by itself. She notes it has not been draining. Has used peroxide over the weekend       Review of Systems   Constitutional:  Negative for activity change, appetite change, fatigue and fever.   HENT:  Positive for hearing loss. Negative for congestion, ear pain, rhinorrhea and sore throat.    Eyes:  Negative for pain.   Respiratory:  Negative for cough and shortness of breath.    Cardiovascular:  Negative for chest pain and leg swelling.   Gastrointestinal:  Negative for abdominal distention, abdominal pain, constipation, diarrhea, nausea and vomiting.   Genitourinary:  Negative for dysuria, frequency and urgency.   Musculoskeletal:  Negative for gait problem.   Skin:  Negative for rash.   Neurological:  Negative for dizziness, light-headedness and headaches.       Objective   /62   Pulse 82   Temp 98.3 °F (36.8 °C)   Ht 4' 10\" (1.473 m)   Wt 62.6 kg (138 lb)   SpO2 93%   BMI 28.84 kg/m²      Physical Exam  Vitals reviewed.   Constitutional:       General: She is not in acute distress.     Appearance: Normal appearance.   HENT:      Head: Normocephalic and atraumatic.      Right Ear: External ear normal. There is impacted cerumen.      Left Ear: External ear normal. " There is impacted cerumen.      Nose: Nose normal.      Mouth/Throat:      Mouth: Mucous membranes are moist.   Eyes:      Extraocular Movements: Extraocular movements intact.      Conjunctiva/sclera: Conjunctivae normal.      Pupils: Pupils are equal, round, and reactive to light.   Cardiovascular:      Rate and Rhythm: Normal rate and regular rhythm.      Heart sounds: Normal heart sounds.   Pulmonary:      Effort: Pulmonary effort is normal.      Breath sounds: Normal breath sounds. No wheezing, rhonchi or rales.   Abdominal:      General: Bowel sounds are normal. There is no distension.      Palpations: Abdomen is soft.      Tenderness: There is no abdominal tenderness.   Musculoskeletal:      Cervical back: Neck supple.      Right lower leg: No edema.      Left lower leg: No edema.   Lymphadenopathy:      Cervical: No cervical adenopathy.   Skin:     General: Skin is warm.      Capillary Refill: Capillary refill takes less than 2 seconds.      Findings: No rash.   Neurological:      Mental Status: She is alert. Mental status is at baseline.       Ear cerumen removal    Date/Time: 5/7/2025 8:00 AM    Performed by: Rhonda Greenwood PA-C  Authorized by: Rhonda Greenwood PA-C    Universal Protocol:  procedure performed by consultantConsent: Verbal consent obtained.  Risks and benefits: risks, benefits and alternatives were discussed  Consent given by: patient  Patient understanding: patient states understanding of the procedure being performed    Patient location:  Clinic  Procedure details:     Local anesthetic:  None    Location:  Both ears    Procedure type: irrigation with instrumentation      Instrumentation: curette      Approach:  External  Post-procedure details:     Complication:  None    Hearing quality:  Improved    Patient tolerance of procedure:  Tolerated well, no immediate complications  Comments:      Some retained wax in right ear. Recommend debrox drops to soften wax to help facilitate  drainage            [facility]  5/7/2025 9:39 AM

## 2025-05-12 DIAGNOSIS — E11.9 DIABETES MELLITUS TYPE 2 IN NONOBESE (HCC): ICD-10-CM

## 2025-05-12 RX ORDER — LANCETS 33 GAUGE
EACH MISCELLANEOUS 3 TIMES DAILY
Qty: 300 EACH | Refills: 1 | Status: SHIPPED | OUTPATIENT
Start: 2025-05-12

## 2025-05-20 DIAGNOSIS — N25.81 SECONDARY HYPERPARATHYROIDISM OF RENAL ORIGIN (HCC): ICD-10-CM

## 2025-05-20 DIAGNOSIS — N18.30 STAGE 3 CHRONIC KIDNEY DISEASE, UNSPECIFIED WHETHER STAGE 3A OR 3B CKD (HCC): ICD-10-CM

## 2025-05-20 RX ORDER — CALCITRIOL 0.25 UG/1
CAPSULE, LIQUID FILLED ORAL
Qty: 40 CAPSULE | Refills: 1 | Status: SHIPPED | OUTPATIENT
Start: 2025-05-20

## 2025-05-25 DIAGNOSIS — E11.9 DIABETES MELLITUS TYPE 2 IN NONOBESE (HCC): ICD-10-CM

## 2025-05-27 RX ORDER — ATORVASTATIN CALCIUM 20 MG/1
20 TABLET, FILM COATED ORAL DAILY
Qty: 90 TABLET | Refills: 1 | Status: SHIPPED | OUTPATIENT
Start: 2025-05-27

## 2025-05-28 ENCOUNTER — OFFICE VISIT (OUTPATIENT)
Dept: GASTROENTEROLOGY | Facility: CLINIC | Age: 75
End: 2025-05-28
Payer: COMMERCIAL

## 2025-05-28 VITALS
WEIGHT: 141.6 LBS | HEART RATE: 64 BPM | SYSTOLIC BLOOD PRESSURE: 114 MMHG | TEMPERATURE: 97.5 F | BODY MASS INDEX: 29.72 KG/M2 | HEIGHT: 58 IN | DIASTOLIC BLOOD PRESSURE: 64 MMHG | OXYGEN SATURATION: 99 %

## 2025-05-28 DIAGNOSIS — R14.0 BLOATING: Primary | ICD-10-CM

## 2025-05-28 PROCEDURE — 99214 OFFICE O/P EST MOD 30 MIN: CPT | Performed by: INTERNAL MEDICINE

## 2025-05-29 ENCOUNTER — TELEPHONE (OUTPATIENT)
Dept: NEPHROLOGY | Facility: CLINIC | Age: 75
End: 2025-05-29

## 2025-05-29 NOTE — TELEPHONE ENCOUNTER
I called and spoke to the patient and reschedule her 6 month nephrology follow up appointment with KRYSTLE Zhao to 10/22/2025 because of Lyla on vacation. I send the patient a new appointment card in the mail showing her the new day and time of her next appointment. The patient understood and was okay with the day and time of her next appointment.

## 2025-05-29 NOTE — PROGRESS NOTES
Name: Iman Walden      : 1950      MRN: [id number]  Encounter Provider: Azael Goncalves DO  Encounter Date: 2025   Encounter department: [facility] [place]    :  Assessment & Plan      Assessment & Plan  1. Gastrointestinal issues:  - Continue current iron supplementation regimen (one pill every other day).  - No melena or bright red blood in stool.  - No abdominal pain, heartburn, nausea, or vomiting.    2. Gas and bloating:  - Initiate daily probiotic (Align or Florastor).  - Recommend over-the-counter products for gas relief (Maalox, Mylanta, Gas-X, Phazyme, simethicone, Beano).  - Increase fiber intake through supplements (Benefiber, FiberCon, Metamucil, gummy fibers) twice daily.  - Monitor symptoms and consider further interventions if necessary.    3. Colonoscopy:  - Schedule colonoscopy due to being overdue (last performed in ).  - Open access colonoscopy setup.      History of Present Illness     History of Present Illness  The patient presents for evaluation of gastrointestinal issues.    She reports a general sense of well-being, with no recent episodes of bleeding. Her last CBC in 2025 showed a normal hemoglobin level of 12.1. She has not experienced any black tarry bowel movements or bright red blood in her stool, although she mentions occasional bleeding from hemorrhoids, which resolves quickly. She does not experience abdominal pain, heartburn, nausea, vomiting, or diarrhea. However, she reports constipation, which she attributes to her iron supplementation. She is currently on a regimen of iron pills, taken every other day.    She experiences significant bloating and gas but is not currently taking any probiotics. She occasionally experiences urinary incontinence, particularly when engaged in activities such as computer work that may delay her access to the bathroom. She describes her bowel movements as formed but notes the  "presence of a creamy liquid that resembles pellets. She is not currently on any fiber supplements but maintains a diet rich in vegetables.    She mentions that she is overdue for a colonoscopy, with her last procedure performed in 2017, during which multiple polyps were removed.    PAST SURGICAL HISTORY:  Endoscopy in 01/2025  Colonoscopy in 2017    SOCIAL HISTORY  Diet: Consumes a lot of fiber-rich foods.       Objective   /64 (BP Location: Right arm, Patient Position: Sitting, Cuff Size: Standard)   Pulse 64   Temp 97.5 °F (36.4 °C) (Temporal)   Ht 4' 10\" (1.473 m)   Wt 64.2 kg (141 lb 9.6 oz)   SpO2 99%   BMI 29.59 kg/m²     Physical Exam  Lungs: Clear to auscultation bilaterally.  Abdomen: Non-tender to palpation.  Extremities: No edema.  Physical Exam  Vitals reviewed.   Constitutional:       General: She is not in acute distress.     Appearance: Normal appearance. She is not ill-appearing.     Eyes:      General: No scleral icterus.     Extraocular Movements: Extraocular movements intact.      Pupils: Pupils are equal, round, and reactive to light.       Cardiovascular:      Rate and Rhythm: Normal rate and regular rhythm.      Pulses: Normal pulses.      Heart sounds: Normal heart sounds. No murmur heard.  Pulmonary:      Effort: Pulmonary effort is normal.      Breath sounds: Normal breath sounds. No wheezing, rhonchi or rales.   Abdominal:      Palpations: Abdomen is soft. There is no mass.      Tenderness: There is no abdominal tenderness. There is no guarding or rebound.     Musculoskeletal:      Right lower leg: No edema.      Left lower leg: No edema.     Skin:     General: Skin is warm and dry.      Coloration: Skin is not jaundiced.      Findings: No rash.     Neurological:      General: No focal deficit present.      Mental Status: She is alert and oriented to person, place, and time.     Psychiatric:         Mood and Affect: Mood normal.         Behavior: Behavior normal.         "

## 2025-06-18 ENCOUNTER — APPOINTMENT (OUTPATIENT)
Age: 75
End: 2025-06-18
Payer: COMMERCIAL

## 2025-06-18 DIAGNOSIS — E11.9 DIABETES MELLITUS TYPE 2 IN NONOBESE (HCC): ICD-10-CM

## 2025-06-18 DIAGNOSIS — D69.6 THROMBOCYTOPENIA (HCC): ICD-10-CM

## 2025-06-18 LAB
ALBUMIN SERPL BCG-MCNC: 3.9 G/DL (ref 3.5–5)
ALP SERPL-CCNC: 68 U/L (ref 34–104)
ALT SERPL W P-5'-P-CCNC: 20 U/L (ref 7–52)
ANION GAP SERPL CALCULATED.3IONS-SCNC: 10 MMOL/L (ref 4–13)
AST SERPL W P-5'-P-CCNC: 23 U/L (ref 13–39)
BASOPHILS # BLD AUTO: 0.05 THOUSANDS/ÂΜL (ref 0–0.1)
BASOPHILS NFR BLD AUTO: 1 % (ref 0–1)
BILIRUB SERPL-MCNC: 0.73 MG/DL (ref 0.2–1)
BUN SERPL-MCNC: 25 MG/DL (ref 5–25)
CALCIUM SERPL-MCNC: 9.4 MG/DL (ref 8.4–10.2)
CHLORIDE SERPL-SCNC: 102 MMOL/L (ref 96–108)
CO2 SERPL-SCNC: 31 MMOL/L (ref 21–32)
CREAT SERPL-MCNC: 1.44 MG/DL (ref 0.6–1.3)
CREAT UR-MCNC: 42.3 MG/DL
EOSINOPHIL # BLD AUTO: 0.68 THOUSAND/ÂΜL (ref 0–0.61)
EOSINOPHIL NFR BLD AUTO: 10 % (ref 0–6)
ERYTHROCYTE [DISTWIDTH] IN BLOOD BY AUTOMATED COUNT: 13.4 % (ref 11.6–15.1)
GFR SERPL CREATININE-BSD FRML MDRD: 36 ML/MIN/1.73SQ M
GLUCOSE P FAST SERPL-MCNC: 129 MG/DL (ref 65–99)
HCT VFR BLD AUTO: 37.1 % (ref 34.8–46.1)
HGB BLD-MCNC: 13.1 G/DL (ref 11.5–15.4)
IMM GRANULOCYTES # BLD AUTO: 0.02 THOUSAND/UL (ref 0–0.2)
IMM GRANULOCYTES NFR BLD AUTO: 0 % (ref 0–2)
LYMPHOCYTES # BLD AUTO: 0.76 THOUSANDS/ÂΜL (ref 0.6–4.47)
LYMPHOCYTES NFR BLD AUTO: 11 % (ref 14–44)
MCH RBC QN AUTO: 30.7 PG (ref 26.8–34.3)
MCHC RBC AUTO-ENTMCNC: 35.3 G/DL (ref 31.4–37.4)
MCV RBC AUTO: 87 FL (ref 82–98)
MICROALBUMIN UR-MCNC: 12.8 MG/L
MICROALBUMIN/CREAT 24H UR: 30 MG/G CREATININE (ref 0–30)
MONOCYTES # BLD AUTO: 0.42 THOUSAND/ÂΜL (ref 0.17–1.22)
MONOCYTES NFR BLD AUTO: 6 % (ref 4–12)
NEUTROPHILS # BLD AUTO: 5.15 THOUSANDS/ÂΜL (ref 1.85–7.62)
NEUTS SEG NFR BLD AUTO: 72 % (ref 43–75)
NRBC BLD AUTO-RTO: 0 /100 WBCS
PLATELET # BLD AUTO: 145 THOUSANDS/UL (ref 149–390)
PMV BLD AUTO: 10.1 FL (ref 8.9–12.7)
POTASSIUM SERPL-SCNC: 4.8 MMOL/L (ref 3.5–5.3)
PROT SERPL-MCNC: 7 G/DL (ref 6.4–8.4)
RBC # BLD AUTO: 4.27 MILLION/UL (ref 3.81–5.12)
SODIUM SERPL-SCNC: 143 MMOL/L (ref 135–147)
WBC # BLD AUTO: 7.08 THOUSAND/UL (ref 4.31–10.16)

## 2025-06-18 PROCEDURE — 83036 HEMOGLOBIN GLYCOSYLATED A1C: CPT

## 2025-06-18 PROCEDURE — 80053 COMPREHEN METABOLIC PANEL: CPT

## 2025-06-18 PROCEDURE — 85025 COMPLETE CBC W/AUTO DIFF WBC: CPT

## 2025-06-18 PROCEDURE — 36415 COLL VENOUS BLD VENIPUNCTURE: CPT

## 2025-06-18 PROCEDURE — 82043 UR ALBUMIN QUANTITATIVE: CPT

## 2025-06-18 PROCEDURE — 82570 ASSAY OF URINE CREATININE: CPT

## 2025-06-19 ENCOUNTER — RESULTS FOLLOW-UP (OUTPATIENT)
Age: 75
End: 2025-06-19

## 2025-06-19 LAB
EST. AVERAGE GLUCOSE BLD GHB EST-MCNC: 220 MG/DL
HBA1C MFR BLD: 9.3 %

## 2025-06-25 ENCOUNTER — OFFICE VISIT (OUTPATIENT)
Age: 75
End: 2025-06-25
Payer: COMMERCIAL

## 2025-06-25 VITALS
SYSTOLIC BLOOD PRESSURE: 118 MMHG | BODY MASS INDEX: 30.14 KG/M2 | TEMPERATURE: 97.6 F | WEIGHT: 143.6 LBS | HEART RATE: 74 BPM | HEIGHT: 58 IN | DIASTOLIC BLOOD PRESSURE: 70 MMHG | OXYGEN SATURATION: 97 %

## 2025-06-25 DIAGNOSIS — E11.9 DIABETES MELLITUS TYPE 2 IN NONOBESE (HCC): Primary | ICD-10-CM

## 2025-06-25 DIAGNOSIS — E78.2 MIXED HYPERLIPIDEMIA: ICD-10-CM

## 2025-06-25 DIAGNOSIS — N18.30 STAGE 3 CHRONIC KIDNEY DISEASE, UNSPECIFIED WHETHER STAGE 3A OR 3B CKD (HCC): ICD-10-CM

## 2025-06-25 DIAGNOSIS — E11.42 DIABETIC PERIPHERAL NEUROPATHY (HCC): ICD-10-CM

## 2025-06-25 DIAGNOSIS — M81.0 AGE-RELATED OSTEOPOROSIS WITHOUT CURRENT PATHOLOGICAL FRACTURE: ICD-10-CM

## 2025-06-25 PROCEDURE — 99214 OFFICE O/P EST MOD 30 MIN: CPT

## 2025-06-25 PROCEDURE — G2211 COMPLEX E/M VISIT ADD ON: HCPCS

## 2025-06-25 RX ORDER — REPAGLINIDE 1 MG/1
1 TABLET ORAL
Qty: 180 TABLET | Refills: 1 | Status: SHIPPED | OUTPATIENT
Start: 2025-06-25

## 2025-06-25 RX ORDER — INSULIN DEGLUDEC 100 U/ML
30 INJECTION, SOLUTION SUBCUTANEOUS
Qty: 30 ML | Refills: 1 | Status: SHIPPED | OUTPATIENT
Start: 2025-06-25

## 2025-06-25 NOTE — ASSESSMENT & PLAN NOTE
Lab Results   Component Value Date    HGBA1C 9.3 (H) 06/18/2025       Orders:  •  Empagliflozin (Jardiance) 25 MG TABS; Take 1 tablet (25 mg total) by mouth daily

## 2025-06-25 NOTE — ASSESSMENT & PLAN NOTE
Continue routine follow-up with nephrology.  Stay well-hydrated.  Continue to improve glycemic control.  Continue Jardiance for renal protective factors.    Lab Results   Component Value Date    EGFR 36 06/18/2025    EGFR 43 03/19/2025    EGFR 26 02/19/2025    CREATININE 1.44 (H) 06/18/2025    CREATININE 1.25 03/19/2025    CREATININE 1.85 (H) 02/19/2025     Orders:  •  Ambulatory referral to Diabetic Education; Future

## 2025-06-25 NOTE — ASSESSMENT & PLAN NOTE
Overdue for DXA scan.  Will send for same today.  Continue calcium and vitamin D supplementations.  Denies recent falls.  Orders:  •  DXA bone density spine hip and pelvis

## 2025-06-25 NOTE — PATIENT INSTRUCTIONS
Continue Tresiba 30 units daily  Increase repaglinide to 1 mg before meals  FOR NOW, TAKE 2 pills before meals to equal 1 mg   Then  new bottle at pharmacy and take 1 pill before meals  Continue Jardiance 25 mg daily  CHECK BLOOD SUGARS ONCE A DAY!!!!!!  Schedule bone density scan    Call the office for blood glucose levels less than 70 mg/dL or persistent patterns over 250 mg/dL.

## 2025-06-25 NOTE — ASSESSMENT & PLAN NOTE
"A1c above goal of less than 7%.    She has been snacking on Wheaties \"all day long.\"  Agreeable to referral to MNT.    Continue Tresiba 30 units daily. Continue Jardiance 25 mg daily.  Increase repaglinide to 1 mg before meals.     Counseled on the long-term effects of hyperglycemia and uncontrolled diabetes including risk for heart attack, stroke, kidney failure, diabetic foot ulcers, limb loss, blindness, and death.    Reviewed risks as well as signs and symptoms of hypoglycemia.  Rule of 15's provided in AVS for appropriate treatment.  Check blood glucose levels once daily, at different times of the day.  Call the office for blood glucose levels less than 70 mg/dL or persistent patterns over 250 mg/dL.      Continue to improve diet and lifestyle including attention to the amount and type of carbohydrates consumed as well as increased physical activity as tolerated.  Stay well-hydrated.      Follow-up in 5 months.  Labs prior to next visit.  Lab Results   Component Value Date    HGBA1C 9.3 (H) 06/18/2025       Orders:  •  Ambulatory referral to Diabetic Education; Future  •  insulin degludec (Tresiba FlexTouch) 100 units/mL injection pen; Inject 30 Units under the skin daily at bedtime  •  repaglinide (PRANDIN) 1 mg tablet; Take 1 tablet (1 mg total) by mouth 2 (two) times a day before meals  •  Empagliflozin (Jardiance) 25 MG TABS; Take 1 tablet (25 mg total) by mouth daily  •  Hemoglobin A1C; Future  •  Basic metabolic panel; Future  "

## 2025-06-25 NOTE — PROGRESS NOTES
"Name: Iman Walden      : 1950      MRN: [id number]  Encounter Provider: KRYSTLE Neville  Encounter Date: 2025   Encounter department: [facility]  :  Assessment & Plan  Diabetes mellitus type 2 in nonobese (HCC)  A1c above goal of less than 7%.    She has been snacking on Wheaties \"all day long.\"  Agreeable to referral to MNT.    Continue Tresiba 30 units daily. Continue Jardiance 25 mg daily.  Increase repaglinide to 1 mg before meals.     Counseled on the long-term effects of hyperglycemia and uncontrolled diabetes including risk for heart attack, stroke, kidney failure, diabetic foot ulcers, limb loss, blindness, and death.    Reviewed risks as well as signs and symptoms of hypoglycemia.  Rule of 15's provided in AVS for appropriate treatment.  Check blood glucose levels once daily, at different times of the day.  Call the office for blood glucose levels less than 70 mg/dL or persistent patterns over 250 mg/dL.      Continue to improve diet and lifestyle including attention to the amount and type of carbohydrates consumed as well as increased physical activity as tolerated.  Stay well-hydrated.      Follow-up in 5 months.  Labs prior to next visit.  Lab Results   Component Value Date    HGBA1C 9.3 (H) 2025       Orders:  •  Ambulatory referral to Diabetic Education; Future  •  insulin degludec (Tresiba FlexTouch) 100 units/mL injection pen; Inject 30 Units under the skin daily at bedtime  •  repaglinide (PRANDIN) 1 mg tablet; Take 1 tablet (1 mg total) by mouth 2 (two) times a day before meals  •  Empagliflozin (Jardiance) 25 MG TABS; Take 1 tablet (25 mg total) by mouth daily  •  Hemoglobin A1C; Future  •  Basic metabolic panel; Future    Stage 3 chronic kidney disease, unspecified whether stage 3a or 3b CKD (HCC)  Continue routine follow-up with nephrology.  Stay well-hydrated.  Continue to improve glycemic " control.  Continue Jardiance for renal protective factors.    Lab Results   Component Value Date    EGFR 36 06/18/2025    EGFR 43 03/19/2025    EGFR 26 02/19/2025    CREATININE 1.44 (H) 06/18/2025    CREATININE 1.25 03/19/2025    CREATININE 1.85 (H) 02/19/2025     Orders:  •  Ambulatory referral to Diabetic Education; Future    Mixed hyperlipidemia  Continue atorvastatin 20 mg daily.       Diabetic peripheral neuropathy (HCC)    Lab Results   Component Value Date    HGBA1C 9.3 (H) 06/18/2025            Age-related osteoporosis without current pathological fracture  Overdue for DXA scan.  Will send for same today.  Continue calcium and vitamin D supplementations.  Denies recent falls.  Orders:  •  DXA bone density spine hip and pelvis          History of Present Illness   Iman Walden is a 75 y.o. female who presents  to the office today for routine follow-up of type 2 diabetes, with long term insulin use. Diabetes course has been stable.  Complications of diabetes include: CKD, neuropathy, retinopathy.  Past medical history is significant for anemia, DVT, bleeding esophageal varices, alcoholic liver cirrhosis, pleural effusion, thrombocytopenia.  She was last seen in the office 2/26/2025 at which time her A1c was 9.8% her insulin regimen was increased, and she was started on repaglinide 3 times daily.  Her most recent A1c is slightly improved to 9.3%.    Denies recent episodes of hypoglycemia.  Symptoms of hypoglycemia include: visual disturbances     Current home glucose monitoring:  NOTHING     Current Medication Regimen:   Tresiba 30 units daily  Repaglinide 0.5 mg 3 times daily before meals  Jardiance 25 mg daily     Poor candidate for GLP-1 agonist medications due to history of bleeding esophageal varices.     Health Maintenance   Topic Date Due   • Diabetic Eye Exam  11/06/2025   • Diabetic Foot Exam  12/11/2025       Has Thyroid Disorder: No  Hypertension, taking: Not on  "ACEi/ARB  Hyperlipidemia, taking: Lipitor 20 mg daily, Tolerating well with no myalgias  History of Pancreatitis: No  Medic Alert Tag: Recommended  Diabetes Education: Attended    History obtained from: patient and patient's Significant Other    Review of Systems   Constitutional:  Negative for chills and fever.   HENT:  Negative for sore throat, trouble swallowing and voice change.    Eyes:  Negative for pain.   Respiratory:  Negative for cough and shortness of breath.    Cardiovascular:  Negative for palpitations.   Gastrointestinal:  Negative for abdominal pain, nausea and vomiting.   Endocrine: Negative for polydipsia and polyuria.   Genitourinary:  Negative for dysuria.   Musculoskeletal:  Positive for arthralgias and gait problem.   Skin:  Negative for wound.   Neurological:  Negative for dizziness, weakness and headaches.   Psychiatric/Behavioral:  Negative for sleep disturbance.        Medications Ordered Prior to Encounter[1]   Social History[2]     Objective   /70 (BP Location: Right arm, Patient Position: Sitting, Cuff Size: Standard)   Pulse 74   Temp 97.6 °F (36.4 °C) (Temporal)   Ht 4' 10\" (1.473 m)   Wt 65.1 kg (143 lb 9.6 oz)   SpO2 97%   BMI 30.01 kg/m²      Physical Exam  Vitals reviewed.   Constitutional:       General: She is not in acute distress.     Appearance: Normal appearance. She is well-groomed. She is obese.   HENT:      Head: Normocephalic and atraumatic.      Mouth/Throat:      Mouth: Mucous membranes are moist.     Eyes:      Conjunctiva/sclera: Conjunctivae normal.       Cardiovascular:      Rate and Rhythm: Normal rate.   Pulmonary:      Effort: Pulmonary effort is normal. No respiratory distress.   Abdominal:      General: There is no distension.      Palpations: Abdomen is soft.     Musculoskeletal:         General: No swelling.      Cervical back: Normal range of motion.     Skin:     General: Skin is warm and dry.      Capillary Refill: Capillary refill takes less " than 2 seconds.     Neurological:      General: No focal deficit present.      Mental Status: She is alert and oriented to person, place, and time.     Psychiatric:         Mood and Affect: Mood normal.         Behavior: Behavior normal. Behavior is cooperative.         Thought Content: Thought content normal.         Judgment: Judgment normal.         Administrative Statements   I have spent a total time of 36 minutes in caring for this patient on the day of the visit/encounter including Diagnostic results, Prognosis, Risks and benefits of tx options, Instructions for management, Patient and family education, Importance of tx compliance, Risk factor reductions, Impressions, Counseling / Coordination of care, Documenting in the medical record, Reviewing/placing orders in the medical record (including tests, medications, and/or procedures), and Obtaining or reviewing history  .         [1]  Current Outpatient Medications on File Prior to Visit   Medication Sig Dispense Refill   • atorvastatin (LIPITOR) 20 mg tablet TAKE 1 TABLET BY MOUTH EVERY DAY 90 tablet 1   • Blood Glucose Monitoring Suppl (ONE TOUCH ULTRA 2) w/Device KIT Use to monitor blood glucose levels once daily 1 kit 0   • calcitriol (ROCALTROL) 0.25 mcg capsule TAKE 1 CAPSULE THREE TIMES A WEEK 40 capsule 1   • CALCIUM PO Take by mouth     • carvedilol (COREG) 3.125 mg tablet TAKE 1 TABLET TWICE A DAY WITH MEALS 180 tablet 1   • ferrous sulfate 325 (65 Fe) mg tablet TAKE 1 TABLET BY MOUTH EVERY OTHER DAY 45 tablet 1   • furosemide (LASIX) 40 mg tablet TAKE 1 TABLET DAILY 100 tablet 1   • gabapentin (NEURONTIN) 300 mg capsule TAKE 2 CAPSULES BY MOUTH 3 TIMES A  capsule 1   • Lancets (OneTouch Delica Plus Sedsbl88B) MISC USE THREE TIMES A  each 1   • loratadine (CLARITIN) 10 mg tablet TAKE 1 TABLET BY MOUTH EVERY DAY 90 tablet 1   • MULTIPLE VITAMIN PO Take 1 tablet by mouth     • OneTouch Ultra test strip      • pantoprazole (PROTONIX) 40 mg  tablet TAKE 1 TABLET BY MOUTH EVERY DAY 90 tablet 1   • sodium chloride (EAMON 128) 5 % hypertonic ophthalmic solution Administer 1 drop to both eyes every evening     • traMADol (ULTRAM) 50 mg tablet Take 1 tablet (50 mg total) by mouth daily as needed for severe pain 30 tablet 0   • [DISCONTINUED] Empagliflozin (Jardiance) 25 MG TABS TAKE 1 TABLET DAILY 90 tablet 1   • [DISCONTINUED] insulin degludec (Tresiba FlexTouch) 100 units/mL injection pen Inject 36 Units under the skin daily at bedtime (Patient taking differently: Inject 30 Units under the skin daily at bedtime) 45 mL 1   • [DISCONTINUED] repaglinide (PRANDIN) 0.5 mg tablet Take 1 tablet (0.5 mg total) by mouth 3 (three) times a day before meals 270 tablet 1     No current facility-administered medications on file prior to visit.   [2]  Social History  Tobacco Use   • Smoking status: Never     Passive exposure: Never   • Smokeless tobacco: Never   Vaping Use   • Vaping status: Every Day   • Substances: THC   Substance and Sexual Activity   • Alcohol use: Not Currently   • Drug use: Yes     Frequency: 7.0 times per week     Types: Marijuana     Comment:  unknown   • Sexual activity: Not Currently     Partners: Male     Birth control/protection: Abstinence

## 2025-06-27 DIAGNOSIS — I10 PRIMARY HYPERTENSION: ICD-10-CM

## 2025-06-27 RX ORDER — CARVEDILOL 3.12 MG/1
3.12 TABLET ORAL 2 TIMES DAILY WITH MEALS
Qty: 180 TABLET | Refills: 1 | Status: SHIPPED | OUTPATIENT
Start: 2025-06-27

## 2025-07-02 ENCOUNTER — OFFICE VISIT (OUTPATIENT)
Dept: HEMATOLOGY ONCOLOGY | Facility: CLINIC | Age: 75
End: 2025-07-02
Payer: COMMERCIAL

## 2025-07-02 VITALS
WEIGHT: 144 LBS | OXYGEN SATURATION: 99 % | SYSTOLIC BLOOD PRESSURE: 126 MMHG | DIASTOLIC BLOOD PRESSURE: 72 MMHG | HEART RATE: 67 BPM | TEMPERATURE: 97.9 F | RESPIRATION RATE: 17 BRPM | BODY MASS INDEX: 30.23 KG/M2 | HEIGHT: 58 IN

## 2025-07-02 DIAGNOSIS — D50.0 IRON DEFICIENCY ANEMIA DUE TO CHRONIC BLOOD LOSS: ICD-10-CM

## 2025-07-02 DIAGNOSIS — D69.6 THROMBOCYTOPENIA (HCC): Primary | ICD-10-CM

## 2025-07-02 PROCEDURE — 99213 OFFICE O/P EST LOW 20 MIN: CPT | Performed by: PHYSICIAN ASSISTANT

## 2025-07-02 NOTE — PROGRESS NOTES
Name: Iman Walden      : 1950      MRN: [id number]  Encounter Provider: Michelle Velazquez PA-C  Encounter Date: 2025   Encounter department: [facility] [employer]  :  Assessment & Plan  Thrombocytopenia (HCC)  Likely thrombocytopenia 2/2 hepatosplenomegaly in setting of cirrhosis  Recent US abdomen showed mild splenomegaly   Immature PLT fraction normal making ITP less likely   Monitor CBC q6m  Pt educated call our office with any s/s of worsening thrombocytopenia ie abnormal bruising, any bleeding (blood in stool, blood in urine, black stools, vaginal bleeding, nose bleeds, gingival bleeding) or petechial like rash which would prompt repeat labs sooner   Orders:    CBC and differential; Future    Iron Panel (Includes Ferritin, Iron Sat%, Iron, and TIBC); Future    Iron deficiency anemia due to chronic blood loss  Last iron panel acceptable   Continue oral iron QOD. She refuses IV iron   Repeat iron panel 6m   Orders:    CBC and differential; Future    Iron Panel (Includes Ferritin, Iron Sat%, Iron, and TIBC); Future      Assessment & Plan        No follow-ups on file.    History of Present Illness   Chief Complaint   Patient presents with    Follow-up     History of Present Illness  74-year-old female with history of CKD, cirrhosis who is seen in follow up today for iron deficiency anemia.  She has history of iron deficiency anemia secondary to esophageal variceal bleeding in  and then had recurrent anemia in 2157-1253 secondary tomultiple erythematous/hemorrhagic and pedunculated polyps. She has been on oral iron with resolution of her anemia.     Pertinent Medical History   25: she is feeling anxious regarding low platelets. No recent changes in medication. Denies hematochezia, melena, hematuria, abnormal bruising or rashes    25: Overall she is feeling well without any acute complaints.  No bruising, hematochezia, melena  "or hematuria.     Review of Systems   All other systems reviewed and are negative.          Objective   /72 (BP Location: Left arm, Patient Position: Sitting, Cuff Size: Adult)   Pulse 67   Temp 97.9 °F (36.6 °C) (Temporal)   Resp 17   Ht 4' 10\" (1.473 m)   Wt 65.3 kg (144 lb)   SpO2 99%   BMI 30.10 kg/m²     Physical Exam  Vitals reviewed.   HENT:      Head: Normocephalic.     Cardiovascular:      Rate and Rhythm: Normal rate and regular rhythm.      Heart sounds: Normal heart sounds.   Pulmonary:      Effort: Pulmonary effort is normal.      Breath sounds: Normal breath sounds.   Abdominal:      Palpations: Abdomen is soft.      Tenderness: There is no abdominal tenderness.     Musculoskeletal:      Cervical back: Neck supple.     Skin:     Findings: No rash.     Neurological:      Mental Status: She is alert.       Physical Exam      Results    Labs: I have reviewed the following labs:  Results for orders placed or performed in visit on 06/18/25   Hemoglobin A1C   Result Value Ref Range    Hemoglobin A1C 9.3 (H) Normal 4.0-5.6%; PreDiabetic 5.7-6.4%; Diabetic >=6.5%; Glycemic control for adults with diabetes <7.0% %     mg/dl   CBC and differential   Result Value Ref Range    WBC 7.08 4.31 - 10.16 Thousand/uL    RBC 4.27 3.81 - 5.12 Million/uL    Hemoglobin 13.1 11.5 - 15.4 g/dL    Hematocrit 37.1 34.8 - 46.1 %    MCV 87 82 - 98 fL    MCH 30.7 26.8 - 34.3 pg    MCHC 35.3 31.4 - 37.4 g/dL    RDW 13.4 11.6 - 15.1 %    MPV 10.1 8.9 - 12.7 fL    Platelets 145 (L) 149 - 390 Thousands/uL    nRBC 0 /100 WBCs    Segmented % 72 43 - 75 %    Immature Grans % 0 0 - 2 %    Lymphocytes % 11 (L) 14 - 44 %    Monocytes % 6 4 - 12 %    Eosinophils Relative 10 (H) 0 - 6 %    Basophils Relative 1 0 - 1 %    Absolute Neutrophils 5.15 1.85 - 7.62 Thousands/µL    Absolute Immature Grans 0.02 0.00 - 0.20 Thousand/uL    Absolute Lymphocytes 0.76 0.60 - 4.47 Thousands/µL    Absolute Monocytes 0.42 0.17 - 1.22 " Thousand/µL    Eosinophils Absolute 0.68 (H) 0.00 - 0.61 Thousand/µL    Basophils Absolute 0.05 0.00 - 0.10 Thousands/µL   Albumin / creatinine urine ratio   Result Value Ref Range    Creatinine, Ur 42.3 Reference range not established. mg/dL    Albumin,U,Random 12.8 <20.0 mg/L    Albumin Creat Ratio 30 0 - 30 mg/g creatinine   Comprehensive metabolic panel   Result Value Ref Range    Sodium 143 135 - 147 mmol/L    Potassium 4.8 3.5 - 5.3 mmol/L    Chloride 102 96 - 108 mmol/L    CO2 31 21 - 32 mmol/L    ANION GAP 10 4 - 13 mmol/L    BUN 25 5 - 25 mg/dL    Creatinine 1.44 (H) 0.60 - 1.30 mg/dL    Glucose, Fasting 129 (H) 65 - 99 mg/dL    Calcium 9.4 8.4 - 10.2 mg/dL    AST 23 13 - 39 U/L    ALT 20 7 - 52 U/L    Alkaline Phosphatase 68 34 - 104 U/L    Total Protein 7.0 6.4 - 8.4 g/dL    Albumin 3.9 3.5 - 5.0 g/dL    Total Bilirubin 0.73 0.20 - 1.00 mg/dL    eGFR 36 ml/min/1.73sq m     Lab Results   Component Value Date    IRON 101 04/16/2025    TIBC 260.4 04/16/2025    FERRITIN 76 04/16/2025

## 2025-07-23 ENCOUNTER — CONSULT (OUTPATIENT)
Age: 75
End: 2025-07-23
Payer: COMMERCIAL

## 2025-07-23 DIAGNOSIS — N18.30 STAGE 3 CHRONIC KIDNEY DISEASE, UNSPECIFIED WHETHER STAGE 3A OR 3B CKD (HCC): ICD-10-CM

## 2025-07-23 DIAGNOSIS — E11.9 DIABETES MELLITUS TYPE 2 IN NONOBESE (HCC): Primary | ICD-10-CM

## 2025-07-23 PROCEDURE — 97802 MEDICAL NUTRITION INDIV IN: CPT

## 2025-07-23 NOTE — PATIENT INSTRUCTIONS
Consume 3 meals daily, 4-5 hours apart. Try not to skip any meals  Consume 30 grams of carbohydrates per meal and no more than 15 grams per snack  Include a source of protein at each of your meals for balance  Start using your desk bike pedal equipment

## 2025-07-23 NOTE — PROGRESS NOTES
Medical Nutrition Therapy    Assessment    Visit Type: Initial visit  Chief complaint/Medical Diagnosis/reason for visit: Type 2 diabetes mellitus in nonobese    HPI Iman Walden was seen today accompanied by her  Bubba, regarding type 2 diabetes. Patient is currently taking 1 tablet Jardiance and 30 units Tresiba daily. Last HbA1c was 9.3. BG is checked 2-3 times weekly and ranges from 116-217 mg/dL.  Iman Sherman states that she has been through diabetes education many times in the past and is not open to making changes at this time. She states that she was on hospice care in the past and does not think that making any changes is worth it at this point. Advised patient that she does not have to make drastic changes to her current diet, just a few small changes. We discussed a meal plan with a little more variety and she seemed more open to trying to improve her diet. Patient also states that she does not like vegetables and is not willing to add them into her diet. She does not use her dentures, so she cannot eat hard foods. Iman Sherman has cut out many carbohydrate foods and her main source of carbohydrate is Wheaties. She states that if she starts to eat other types of carbohydrates, she will not be able to limit herself to a specific portion. She is resistant to portioning her carbohydrate intake, as she has tried to in the past and was unable to do so.   Problems identified in food recall include meal skipping, inconsistent carbohydrate intake, low intake of protein, and minimal to no intake of non-starchy vegetables. Explained basic pathophysiology of diabetes and impact of diet on blood glucose levels. Together we discussed what foods contain carbohydrates, reading a food label, timing of meals and snacks, serving sizes, the role of fiber, the importance of consistent carbohydrate intake in the appropriate amounts. Used the portion booklet to teach Iman Sherman more about food groups and  "basic carbohydrate counting. Encouraged 3 regular meals ~4-5 hours apart with 1-2 snacks per day as needed. Discussed keeping carbohydrate intake consistent. Goal is 30 grams of carbohydrate per meal and 0-15 grams of carbohydrate per snack. Patient was given a handout on reducing sodium intake in conjunction with her diagnosis of CKD and GFR <60 for comprehensive care. Iman Sherman demonstrated good understanding and will call with any questions or if she would like to schedule a follow-up visit.      Ht Readings from Last 1 Encounters:   07/02/25 4' 10\" (1.473 m)     Wt Readings from Last 3 Encounters:   07/02/25 65.3 kg (144 lb)   06/25/25 65.1 kg (143 lb 9.6 oz)   05/28/25 64.2 kg (141 lb 9.6 oz)     Weight Change: Yes fluctuates between 130 and 140    Barriers to Learning: no barriers    Do you follow any special diet presently?: No  Who shops: spouse  Who cooks: spouse    Food Log: Completed via the method of usual daily food recall    Wakes up at 7-8 AM   Breakfast: 8-10 AM - 4-5 cups wheaties and sometimes a banana    Morning Snack:none  Lunch:none   Afternoon Snack: 2-3 PM - sometimes more wheaties   Dinner:7 PM - 3-4 oz chicken, 3-4 steak fries, 1 cup wheaties OR 2-3 meat balls, 3-4 steak fries, 1 cup wheaties OR chicken, 1/3 cup pasta   Evening Snack:9 PM - [employer] ice cream bar   Beverages: 3-4 cups coffee (2% milk, sweet n low), water (2 red solo cups), 3-4 cups zero sugar cranberry juice   Eating out/Take out:twice monthly  Exercise has a desk bike pedal but doesn't really use it      Estimated calorie needs: 1258 kcals/day   Carbohydrate: 30 g/meal, 0-15 g/snack       Fat: 3 servings/day      Protein: 5 ounces/day    Nutrition Diagnosis:  Inconsistent carbohydrate intake related to food and nutrition related knowledge deficit concerning appropriate amount and timing of carbohydrate intake as evidenced by estimated carbohydrate intake that is different from recommended types or ingested on an " irregular basis.    Intervention: increased protein intake, increased plant based foods, meal timing, meal planning, individualized meal plan, monitoring portion control, and exercise guidelines     Treatment Goals: Patient understands education and recommendations, Patient will consume 3 meals a day, Patient will monitor portion control, Patient will increase their intake of plant based foods, Patient will exercise, and Patient will monitor blood glucose    Monitoring and evaluation:    Term code indicator  FH 1.3.2 Food Intake Criteria: Consume 3 meals daily, 4-5 hours apart. Try not to skip any meals  Term code indicator  FH 1.6.3 Carbohydrate Intake Criteria: Consume 30 grams of carbohydrates per meal and no more than 15 grams per snack  Term code indicator  FH 1.6.2 Protein Intake Criteria: Include a source of protein at each of your meals for balance  Term code indicator  CH 2.2 Treatments/Therapy/Alternative Medicine Criteria: Start using your desk bike pedal equipment    Materials Provided: Portion booklet, sodium label handout    Patient’s Response to Instruction:  Comprehension: good  Motivation: good  Expected Compliance: good           Start- Stop: 12:58-1:58  Total Minutes: 60 Minutes  Group or Individual Instruction: MNT-I  Other: KRYSTLE Cowart     Thank you for coming to the [facility] for education today. Please feel free to call with any questions or concerns.    Emily Weiner RD, MHSc, LDN  [address] [place] [id number] [phone]

## 2025-08-13 ENCOUNTER — OFFICE VISIT (OUTPATIENT)
Dept: FAMILY MEDICINE CLINIC | Facility: CLINIC | Age: 75
End: 2025-08-13
Payer: COMMERCIAL

## 2025-08-18 DIAGNOSIS — F10.11 HISTORY OF ALCOHOL ABUSE: ICD-10-CM

## 2025-08-18 DIAGNOSIS — K70.30 ALCOHOLIC CIRRHOSIS OF LIVER WITHOUT ASCITES (HCC): ICD-10-CM

## 2025-08-19 RX ORDER — PANTOPRAZOLE SODIUM 40 MG/1
40 TABLET, DELAYED RELEASE ORAL DAILY
Qty: 90 TABLET | Refills: 1 | Status: SHIPPED | OUTPATIENT
Start: 2025-08-19